# Patient Record
Sex: MALE | Race: WHITE | NOT HISPANIC OR LATINO | Employment: FULL TIME | URBAN - METROPOLITAN AREA
[De-identification: names, ages, dates, MRNs, and addresses within clinical notes are randomized per-mention and may not be internally consistent; named-entity substitution may affect disease eponyms.]

---

## 2017-02-14 ENCOUNTER — OFFICE VISIT (OUTPATIENT)
Dept: FAMILY MEDICINE | Facility: CLINIC | Age: 52
End: 2017-02-14
Payer: COMMERCIAL

## 2017-02-14 VITALS
TEMPERATURE: 97.9 F | SYSTOLIC BLOOD PRESSURE: 130 MMHG | BODY MASS INDEX: 28.64 KG/M2 | OXYGEN SATURATION: 98 % | HEIGHT: 68 IN | DIASTOLIC BLOOD PRESSURE: 64 MMHG | RESPIRATION RATE: 16 BRPM | HEART RATE: 90 BPM | WEIGHT: 189 LBS

## 2017-02-14 DIAGNOSIS — J02.9 ACUTE PHARYNGITIS, UNSPECIFIED ETIOLOGY: Primary | ICD-10-CM

## 2017-02-14 LAB
DEPRECATED S PYO AG THROAT QL EIA: NORMAL
MICRO REPORT STATUS: NORMAL
SPECIMEN SOURCE: NORMAL

## 2017-02-14 PROCEDURE — 99213 OFFICE O/P EST LOW 20 MIN: CPT | Performed by: PHYSICIAN ASSISTANT

## 2017-02-14 PROCEDURE — 87880 STREP A ASSAY W/OPTIC: CPT | Performed by: PHYSICIAN ASSISTANT

## 2017-02-14 PROCEDURE — 87081 CULTURE SCREEN ONLY: CPT | Performed by: PHYSICIAN ASSISTANT

## 2017-02-14 NOTE — MR AVS SNAPSHOT
"              After Visit Summary   2/14/2017    Castillo Allen    MRN: [id number]           Patient Information     Date Of Birth          1965        Visit Information        Provider Department      2/14/2017 8:10 AM Ingrid Nuñez PA-C [facility] [place]        Today's Diagnoses     Acute pharyngitis, unspecified etiology    -  1       Follow-ups after your visit        Who to contact     If you have questions or need follow up information about today's clinic visit or your schedule please contact [facility] directly at [phone].  Normal or non-critical lab and imaging results will be communicated to you by Wattvisionhart, letter or phone within 4 business days after the clinic has received the results. If you do not hear from us within 7 days, please contact the clinic through Acie[employer] The[employer] or phone. If you have a critical or abnormal lab result, we will notify you by phone as soon as possible.  Submit refill requests through Muchasa or call your pharmacy and they will forward the refill request to us. Please allow 3 business days for your refill to be completed.          Additional Information About Your Visit        MyChart Information     Muchasa gives you secure access to your electronic health record. If you see a primary care provider, you can also send messages to your care team and make appointments. If you have questions, please call your primary care clinic.  If you do not have a primary care provider, please call [phone] and they will assist you.        Care EveryWhere ID     This is your Care EveryWhere ID. This could be used by other organizations to access your [facility] medical records  [id number]        Your Vitals Were     Pulse Temperature Respirations Height Pulse Oximetry BMI (Body Mass Index)    90 97.9  F (36.6  C) (Tympanic) 16 5' 8\" (1.727 m) 98% 28.74 kg/m2       Blood Pressure from Last 3 Encounters:   02/14/17 130/64   12/06/16 138/72   10/25/16 " 136/88    Weight from Last 3 Encounters:   02/14/17 189 lb (85.7 kg)   12/06/16 187 lb 2 oz (84.9 kg)   10/25/16 183 lb 12.8 oz (83.4 kg)              We Performed the Following     Beta strep group A culture     Rapid strep screen        Primary Care Provider Office Phone # Fax #    Ingrid Sallie Nuñez PA-C [phone]       [facility] [address]  [facility] [address]        Thank you!     Thank you for choosing [facility]  for your care. Our goal is always to provide you with excellent care. Hearing back from our patients is one way we can continue to improve our services. Please take a few minutes to complete the written survey that you may receive in the mail after your visit with us. Thank you!             Your Updated Medication List - Protect others around you: Learn how to safely use, store and throw away your medicines at www.disposemymeds.org.          This list is accurate as of: 2/14/17  8:33 AM.  Always use your most recent med list.                   Brand Name Dispense Instructions for use    ALPRAZolam 0.25 MG tablet    XANAX    15 tablet    Take 1 tablet (0.25 mg) by mouth 3 times daily as needed for anxiety       IBUPROFEN PO          NO ACTIVE MEDICATIONS

## 2017-02-14 NOTE — NURSING NOTE
"Chief Complaint   Patient presents with     Throat Problem     x 3 days       Initial /64 (BP Location: Right arm, Patient Position: Chair, Cuff Size: Adult Large)  Pulse 90  Temp 97.9  F (36.6  C) (Tympanic)  Resp 16  Ht 5' 8\" (1.727 m)  Wt 189 lb (85.7 kg)  SpO2 98%  BMI 28.74 kg/m2 Estimated body mass index is 28.74 kg/(m^2) as calculated from the following:    Height as of this encounter: 5' 8\" (1.727 m).    Weight as of this encounter: 189 lb (85.7 kg).  Medication Reconciliation: complete     Patient would like to be notified at the following phone number for results from this visit   [phone] OK to leave message or Memo Briggs CMA (AAMA) 2/14/2017 8:17 AM      "

## 2017-02-14 NOTE — PROGRESS NOTES
SUBJECTIVE:                                                    Castillo Allen is a 51 year old male who presents to clinic today for the following health issues:      RESPIRATORY SYMPTOMS      Duration: x 3 days    Description  Sore Throat, Fatigue, Chills, Dry Cough, Bilateral Ear Pain--Right Ear Worse Than Left     Severity: moderate    Accompanying signs and symptoms: None    History (predisposing factors):  Recent Travel    Precipitating or alleviating factors: None    Therapies tried and outcome:  Tylenol       Patient is here today complaining of not feeling well for the last couple of days  Notes fatigue, sore throat that worsened last night but feels a bit better today  Bilateral ear pain, however right is worse than left  No fever, mild sinus drainage and cough  Admits to some chills  Taking Tylenol without relief  Recently back from [place]- vacation    Problem list and histories reviewed & adjusted, as indicated.  Additional history: as documented    Patient Active Problem List   Diagnosis     Allergic rhinitis     Hypertrophy of breast     Lumbago     Nonallopathic lesion of sacral region     Nonallopathic lesion of lower extremities     CARDIOVASCULAR SCREENING; LDL GOAL LESS THAN 160     Brachial neuritis or radiculitis     Past Surgical History   Procedure Laterality Date     Vasectomy         Social History   Substance Use Topics     Smoking status: Never Smoker     Smokeless tobacco: Never Used     Alcohol use 0.6 oz/week     1 Standard drinks or equivalent per week     Family History   Problem Relation Age of Onset     Cancer - colorectal Father       at about age 74--dx at around age 50     DIABETES Mother      DIABETES Maternal Grandmother      DIABETES Brother      44     Thyroid Disease Sister      Thyroid Disease Brother      Thyroid Disease Brother      CEREBROVASCULAR DISEASE Maternal Grandfather          Current Outpatient Prescriptions   Medication Sig Dispense Refill      "ALPRAZolam (XANAX) 0.25 MG tablet Take 1 tablet (0.25 mg) by mouth 3 times daily as needed for anxiety 15 tablet 0     IBUPROFEN PO        NO ACTIVE MEDICATIONS        Allergies   Allergen Reactions     Seasonal Allergies        ROS:  Constitutional, HEENT, cardiovascular, pulmonary, gi and gu systems are negative, except as otherwise noted.    OBJECTIVE:                                                    /64 (BP Location: Right arm, Patient Position: Chair, Cuff Size: Adult Large)  Pulse 90  Temp 97.9  F (36.6  C) (Tympanic)  Resp 16  Ht 5' 8\" (1.727 m)  Wt 189 lb (85.7 kg)  SpO2 98%  BMI 28.74 kg/m2  Body mass index is 28.74 kg/(m^2).  GENERAL: healthy, alert and no distress  EYES: Eyes grossly normal to inspection, PERRL and conjunctivae and sclerae normal  HENT: normal cephalic/atraumatic, ear canals and TM's normal, nose and mouth without ulcers or lesions, oropharynx clear, oral mucous membranes moist and tonsillar erythema  NECK: cervical adenopathy anterior bilateral, no asymmetry, masses, or scars and thyroid normal to palpation  RESP: lungs clear to auscultation - no rales, rhonchi or wheezes  CV: regular rate and rhythm, normal S1 S2, no S3 or S4, no murmur, click or rub, no peripheral edema and peripheral pulses strong  ABDOMEN: soft, nontender, no hepatosplenomegaly, no masses and bowel sounds normal  MS: no gross musculoskeletal defects noted, no edema    Diagnostic Test Results:  Results for orders placed or performed in visit on 02/14/17 (from the past 24 hour(s))   Rapid strep screen   Result Value Ref Range    Specimen Description Throat     Rapid Strep A Screen       NEGATIVE: No Group A streptococcal antigen detected by immunoassay, await   culture report.      Micro Report Status FINAL 02/14/2017         ASSESSMENT/PLAN:                                                            1. Acute pharyngitis, unspecified etiology  New problem, discussed with patient that illness is likely viral in " etiology. Recommend rest, fluids and over the counter medications.  Advised follow up if symptoms worsen or do not alleviate or improve.    - Rapid strep screen  - Beta strep group A culture    Risks, benefits and alternatives were discussed with patient. Agreeable to the plan of care.      Ingrid Nuñez PA-C  [facility]

## 2017-02-16 LAB
BACTERIA SPEC CULT: NORMAL
MICRO REPORT STATUS: NORMAL
SPECIMEN SOURCE: NORMAL

## 2017-09-13 ENCOUNTER — OFFICE VISIT (OUTPATIENT)
Dept: FAMILY MEDICINE | Facility: CLINIC | Age: 52
End: 2017-09-13
Payer: COMMERCIAL

## 2017-09-13 VITALS
HEART RATE: 78 BPM | TEMPERATURE: 97.5 F | WEIGHT: 185 LBS | OXYGEN SATURATION: 98 % | SYSTOLIC BLOOD PRESSURE: 134 MMHG | DIASTOLIC BLOOD PRESSURE: 80 MMHG | BODY MASS INDEX: 28.04 KG/M2 | HEIGHT: 68 IN

## 2017-09-13 DIAGNOSIS — Z00.00 ROUTINE GENERAL MEDICAL EXAMINATION AT A HEALTH CARE FACILITY: Primary | ICD-10-CM

## 2017-09-13 DIAGNOSIS — L72.3 SEBACEOUS CYST: ICD-10-CM

## 2017-09-13 DIAGNOSIS — N39.43 DRIBBLING URINE: ICD-10-CM

## 2017-09-13 DIAGNOSIS — Z23 NEED FOR PROPHYLACTIC VACCINATION AND INOCULATION AGAINST INFLUENZA: ICD-10-CM

## 2017-09-13 LAB
ALBUMIN SERPL-MCNC: 3.9 G/DL (ref 3.4–5)
ALP SERPL-CCNC: 84 U/L (ref 40–150)
ALT SERPL W P-5'-P-CCNC: 40 U/L (ref 0–70)
ANION GAP SERPL CALCULATED.3IONS-SCNC: 7 MMOL/L (ref 3–14)
AST SERPL W P-5'-P-CCNC: 24 U/L (ref 0–45)
BILIRUB SERPL-MCNC: 1.4 MG/DL (ref 0.2–1.3)
BUN SERPL-MCNC: 16 MG/DL (ref 7–30)
CALCIUM SERPL-MCNC: 8.8 MG/DL (ref 8.5–10.1)
CHLORIDE SERPL-SCNC: 102 MMOL/L (ref 94–109)
CHOLEST SERPL-MCNC: 171 MG/DL
CO2 SERPL-SCNC: 27 MMOL/L (ref 20–32)
CREAT SERPL-MCNC: 1.01 MG/DL (ref 0.66–1.25)
ERYTHROCYTE [DISTWIDTH] IN BLOOD BY AUTOMATED COUNT: 12 % (ref 10–15)
GFR SERPL CREATININE-BSD FRML MDRD: 77 ML/MIN/1.7M2
GLUCOSE SERPL-MCNC: 102 MG/DL (ref 70–99)
HCT VFR BLD AUTO: 43.3 % (ref 40–53)
HDLC SERPL-MCNC: 45 MG/DL
HGB BLD-MCNC: 15.6 G/DL (ref 13.3–17.7)
LDLC SERPL CALC-MCNC: 90 MG/DL
MCH RBC QN AUTO: 31 PG (ref 26.5–33)
MCHC RBC AUTO-ENTMCNC: 36 G/DL (ref 31.5–36.5)
MCV RBC AUTO: 86 FL (ref 78–100)
NONHDLC SERPL-MCNC: 126 MG/DL
PLATELET # BLD AUTO: 281 10E9/L (ref 150–450)
POTASSIUM SERPL-SCNC: 4.4 MMOL/L (ref 3.4–5.3)
PROT SERPL-MCNC: 6.9 G/DL (ref 6.8–8.8)
PSA SERPL-ACNC: 3.18 UG/L (ref 0–4)
RBC # BLD AUTO: 5.03 10E12/L (ref 4.4–5.9)
SODIUM SERPL-SCNC: 136 MMOL/L (ref 133–144)
TRIGL SERPL-MCNC: 181 MG/DL
TSH SERPL DL<=0.005 MIU/L-ACNC: 1.46 MU/L (ref 0.4–4)
WBC # BLD AUTO: 6.9 10E9/L (ref 4–11)

## 2017-09-13 PROCEDURE — G0103 PSA SCREENING: HCPCS | Performed by: PHYSICIAN ASSISTANT

## 2017-09-13 PROCEDURE — 84443 ASSAY THYROID STIM HORMONE: CPT | Performed by: PHYSICIAN ASSISTANT

## 2017-09-13 PROCEDURE — 90686 IIV4 VACC NO PRSV 0.5 ML IM: CPT | Performed by: PHYSICIAN ASSISTANT

## 2017-09-13 PROCEDURE — 80053 COMPREHEN METABOLIC PANEL: CPT | Performed by: PHYSICIAN ASSISTANT

## 2017-09-13 PROCEDURE — 80061 LIPID PANEL: CPT | Performed by: PHYSICIAN ASSISTANT

## 2017-09-13 PROCEDURE — 90471 IMMUNIZATION ADMIN: CPT | Performed by: PHYSICIAN ASSISTANT

## 2017-09-13 PROCEDURE — 99213 OFFICE O/P EST LOW 20 MIN: CPT | Mod: 25 | Performed by: PHYSICIAN ASSISTANT

## 2017-09-13 PROCEDURE — 85027 COMPLETE CBC AUTOMATED: CPT | Performed by: PHYSICIAN ASSISTANT

## 2017-09-13 PROCEDURE — 36415 COLL VENOUS BLD VENIPUNCTURE: CPT | Performed by: PHYSICIAN ASSISTANT

## 2017-09-13 PROCEDURE — 99396 PREV VISIT EST AGE 40-64: CPT | Mod: 25 | Performed by: PHYSICIAN ASSISTANT

## 2017-09-13 RX ORDER — ESCITALOPRAM OXALATE 20 MG/1
TABLET ORAL
COMMUNITY
Start: 2017-06-26 | End: 2017-10-12

## 2017-09-13 NOTE — PROGRESS NOTES
SUBJECTIVE:   CC: Castillo Allen is an 52 year old male who presents for preventative health visit.     Physical   Annual:     Getting at least 3 servings of Calcium per day::  Yes    Bi-annual eye exam::  Yes    Dental care twice a year::  Yes    Sleep apnea or symptoms of sleep apnea::  None    Diet::  Regular (no restrictions)    Frequency of exercise::  1 day/week    Duration of exercise::  Less than 15 minutes    Taking medications regularly::  Not Applicable    Additional concerns today::  YES (prostate concerns, lump/bump on head)      Prostate: over the last few years having trouble with slower urine, dribble urine          Today's PHQ-2 Score:   PHQ-2 ( 1999 Pfizer) 9/13/2017   Q1: Little interest or pleasure in doing things 0   Q2: Feeling down, depressed or hopeless 0   PHQ-2 Score 0   Q1: Little interest or pleasure in doing things Not at all   Q2: Feeling down, depressed or hopeless Not at all   PHQ-2 Score 0       Abuse: Current or Past(Physical, Sexual or Emotional)- No  Do you feel safe in your environment - Yes    Social History   Substance Use Topics     Smoking status: Never Smoker     Smokeless tobacco: Never Used     Alcohol use 0.6 oz/week     1 Standard drinks or equivalent per week     The patient does not drink >3 drinks per day nor >7 drinks per week.    Last PSA:   PSA   Date Value Ref Range Status   07/16/2015 1.96 0 - 4 ug/L Final       Reviewed orders with patient. Reviewed health maintenance and updated orders accordingly - Yes  Patient Active Problem List   Diagnosis     Allergic rhinitis     Hypertrophy of breast     Lumbago     Nonallopathic lesion of sacral region     Nonallopathic lesion of lower extremities     CARDIOVASCULAR SCREENING; LDL GOAL LESS THAN 160     Brachial neuritis or radiculitis     Past Surgical History:   Procedure Laterality Date     VASECTOMY         Social History   Substance Use Topics     Smoking status: Never Smoker     Smokeless tobacco: Never Used      "Alcohol use 0.6 oz/week     1 Standard drinks or equivalent per week     Family History   Problem Relation Age of Onset     Cancer - colorectal Father       at about age 74--dx at around age 50     DIABETES Mother      DIABETES Maternal Grandmother      DIABETES Brother      44     Thyroid Disease Sister      Thyroid Disease Brother      Thyroid Disease Brother      CEREBROVASCULAR DISEASE Maternal Grandfather          Current Outpatient Prescriptions   Medication Sig Dispense Refill     ALPRAZolam (XANAX) 0.25 MG tablet Take 1 tablet (0.25 mg) by mouth 3 times daily as needed for anxiety 15 tablet 0     IBUPROFEN PO        escitalopram (LEXAPRO) 20 MG tablet        Allergies   Allergen Reactions     Seasonal Allergies          Reviewed and updated as needed this visit by clinical staffTobacco  Allergies  Med Hx  Surg Hx  Fam Hx  Soc Hx        Reviewed and updated as needed this visit by Provider          ROS:  C: NEGATIVE for fever, chills, change in weight  I: patient is concerned about cysts on head, has had removed before, currently has two, ongoing for months, would like removed as they are bothersome  E: NEGATIVE for vision changes or irritation  ENT: NEGATIVE for ear, mouth and throat problems  R: NEGATIVE for significant cough or SOB  CV: NEGATIVE for chest pain, palpitations or peripheral edema  GI: NEGATIVE for nausea, abdominal pain, heartburn, or change in bowel habits   male: + dribbling urine and slow stream of urine, ongoing for years, wants to try medicine to help with flow, no painful, no hematuria, no erectile dysfunction.  M: NEGATIVE for significant arthralgias or myalgia  N: NEGATIVE for weakness, dizziness or paresthesias  P: notes mood is better, off Lexapro, has Xanax as needed.    OBJECTIVE:   /80 (BP Location: Right arm, Patient Position: Chair, Cuff Size: Adult Regular)  Pulse 78  Temp 97.5  F (36.4  C) (Tympanic)  Ht 5' 7.5\" (1.715 m)  Wt 185 lb (83.9 kg)  SpO2 " "98%  BMI 28.55 kg/m2    EXAM:  GENERAL: healthy, alert and no distress  EYES: Eyes grossly normal to inspection, PERRL and conjunctivae and sclerae normal  HENT: ear canals and TM's normal, nose and mouth without ulcers or lesions  NECK: no adenopathy, no asymmetry, masses, or scars and thyroid normal to palpation  RESP: lungs clear to auscultation - no rales, rhonchi or wheezes  CV: regular rate and rhythm, normal S1 S2, no S3 or S4, no murmur, click or rub, no peripheral edema and peripheral pulses strong  ABDOMEN: soft, nontender, no hepatosplenomegaly, no masses and bowel sounds normal  RECTAL: normal sphincter tone, no mass present, prostate smooth and symmetric  MS: no gross musculoskeletal defects noted, no edema  SKIN: + two small cysts on head  NEURO: Normal strength and tone, mentation intact and speech normal  PSYCH: mentation appears normal, affect normal/bright    ASSESSMENT/PLAN:   1. Routine general medical examination at a health care facility  - CBC with platelets  - Comprehensive metabolic panel  - TSH with free T4 reflex  - Lipid panel reflex to direct LDL    2. Dribbling urine  New problem, suspect BPH, will check PSA. Prostate exam normal.  Will trial Flomax if labs okay.  - Prostate spec antigen screen    3. Sebaceous cyst  New problem, referral placed to dermatology for removal.  - DERMATOLOGY REFERRAL    4. Need for prophylactic vaccination and inoculation against influenza  - FLU VAC, SPLIT VIRUS IM > 3 YO (QUADRIVALENT) [94030]  - Vaccine Administration, Initial [91123]    COUNSELING:   Reviewed preventive health counseling, as reflected in patient instructions       reports that he has never smoked. He has never used smokeless tobacco.      Estimated body mass index is 28.55 kg/(m^2) as calculated from the following:    Height as of this encounter: 5' 7.5\" (1.715 m).    Weight as of this encounter: 185 lb (83.9 kg).   Weight management plan: Discussed healthy diet and exercise guidelines " and patient will follow up in 12 months in clinic to re-evaluate.    Counseling Resources:  ATP IV Guidelines  Pooled Cohorts Equation Calculator  FRAX Risk Assessment  ICSI Preventive Guidelines  Dietary Guidelines for Americans, 2010  USDA's MyPlate  ASA Prophylaxis  Lung CA Screening    Ingrid Nuñez PA-C  [facility]  Answers for HPI/ROS submitted by the patient on 9/13/2017   PHQ-2 Score: 0    Injectable Influenza Immunization Documentation    1.  Are you sick today? (Fever of 100.5 or higher on the day of the clinic)   No    2.  Have you ever had Guillain-[place] Syndrome within 6 weeks of an influenza vaccionation?  No    3. Do you have a life-threatening allergy to eggs?  No    4. Do you have a life-threatening allergy to a component of the vaccine? May include antibiotics, gelatin or latex.  No     5. Have you ever had a reaction to a dose of flu vaccine that needed immediate medical attention?  No     Form completed by pt/.Mau HOOPER MA

## 2017-09-13 NOTE — MR AVS SNAPSHOT
After Visit Summary   9/13/2017    Castillo Allen    MRN: [id number]           Patient Information     Date Of Birth          1965        Visit Information        Provider Department      9/13/2017 7:50 AM Ingrid Nuñez PA-C [facility]        Today's Diagnoses     Routine general medical examination at a health care facility    -  1    Dribbling urine        Sebaceous cyst          Care Instructions      Preventive Health Recommendations  Male Ages 50 - 64    Yearly exam:             See your health care provider every year in order to  o   Review health changes.   o   Discuss preventive care.    o   Review your medicines if your doctor has prescribed any.     Have a cholesterol test every 5 years, or more frequently if you are at risk for high cholesterol/heart disease.     Have a diabetes test (fasting glucose) every three years. If you are at risk for diabetes, you should have this test more often.     Have a colonoscopy at age 50, or have a yearly FIT test (stool test). These exams will check for colon cancer.      Talk with your health care provider about whether or not a prostate cancer screening test (PSA) is right for you.    You should be tested each year for STDs (sexually transmitted diseases), if you re at risk.     Shots: Get a flu shot each year. Get a tetanus shot every 10 years.     Nutrition:    Eat at least 5 servings of fruits and vegetables daily.     Eat whole-grain bread, whole-wheat pasta and brown rice instead of white grains and rice.     Talk to your provider about Calcium and Vitamin D.     Lifestyle    Exercise for at least 150 minutes a week (30 minutes a day, 5 days a week). This will help you control your weight and prevent disease.     Limit alcohol to one drink per day.     No smoking.     Wear sunscreen to prevent skin cancer.     See your dentist every six months for an exam and cleaning.     See your eye doctor every 1 to 2  years.            Follow-ups after your visit        Additional Services     DERMATOLOGY REFERRAL       Your provider has referred you to: [facility]: [facility] [phone]   [email or web address]  [facility]: [facility] [place] [phone]   [email or web address]    Please be aware that coverage of these services is subject to the terms and limitations of your health insurance plan.  Call member services at your health plan with any benefit or coverage questions.      Please bring the following with you to your appointment:    (1) Any X-Rays, CTs or MRIs which have been performed.  Contact the facility where they were done to arrange for  prior to your scheduled appointment.    (2) List of current medications  (3) This referral request   (4) Any documents/labs given to you for this referral                  Who to contact     If you have questions or need follow up information about today's clinic visit or your schedule please contact [facility] directly at [phone].  Normal or non-critical lab and imaging results will be communicated to you by MyChart, letter or phone within 4 business days after the clinic has received the results. If you do not hear from us within 7 days, please contact the clinic through Flavourshart or phone. If you have a critical or abnormal lab result, we will notify you by phone as soon as possible.  Submit refill requests through Coveo or call your pharmacy and they will forward the refill request to us. Please allow 3 business days for your refill to be completed.          Additional Information About Your Visit        MyChart Information     Coveo gives you secure access to your electronic health record. If you see a primary care provider, you can also send messages to your care team and make appointments. If you have questions, please call your primary  "care clinic.  If you do not have a primary care provider, please call [phone] and they will assist you.        Care EveryWhere ID     This is your Care EveryWhere ID. This could be used by other organizations to access your [facility] medical records  [id number]        Your Vitals Were     Pulse Temperature Height Pulse Oximetry BMI (Body Mass Index)       78 97.5  F (36.4  C) (Tympanic) 5' 7.5\" (1.715 m) 98% 28.55 kg/m2        Blood Pressure from Last 3 Encounters:   09/13/17 134/80   02/14/17 130/64   12/06/16 138/72    Weight from Last 3 Encounters:   09/13/17 185 lb (83.9 kg)   02/14/17 189 lb (85.7 kg)   12/06/16 187 lb 2 oz (84.9 kg)              We Performed the Following     CBC with platelets     Comprehensive metabolic panel     DERMATOLOGY REFERRAL     Lipid panel reflex to direct LDL     Prostate spec antigen screen     TSH with free T4 reflex        Primary Care Provider Office Phone # Fax #    Ingrid Nuñez PA-C [phone] [address] [facility] [address]        Equal Access to Services     ANTONIO SOLIS AH: Hadii aad ku hadasho Soomaali, waaxda luqadaha, qaybta kaalmada adeegyada, waxay idiin hayaan adeeg khararobbie la'aan ah. So [facility] [phone].    ATENCIÓN: Si habla español, tiene a pulliam disposición servicios gratuitos de asistencia lingüística. Bibiame al [phone].    We comply with applicable federal civil rights laws and [place] laws. We do not discriminate on the basis of race, color, national origin, age, disability sex, sexual orientation or gender identity.            Thank you!     Thank you for choosing [facility]  for your care. Our goal is always to provide you with excellent care. Hearing back from our patients is one way we can continue to improve our services. Please take a few minutes to complete the written survey that you may receive in the mail after your visit with us. Thank you!             Your Updated Medication List - " Protect others around you: Learn how to safely use, store and throw away your medicines at www.disposemymeds.org.          This list is accurate as of: 9/13/17  8:08 AM.  Always use your most recent med list.                   Brand Name Dispense Instructions for use Diagnosis    ALPRAZolam 0.25 MG tablet    XANAX    15 tablet    Take 1 tablet (0.25 mg) by mouth 3 times daily as needed for anxiety    Adjustment disorder with mixed anxiety and depressed mood       escitalopram 20 MG tablet    LEXAPRO          IBUPROFEN PO

## 2017-09-13 NOTE — NURSING NOTE
"Chief Complaint   Patient presents with     Physical     fasting        Initial /80 (BP Location: Right arm, Patient Position: Chair, Cuff Size: Adult Regular)  Pulse 78  Temp 97.5  F (36.4  C) (Tympanic)  Ht 5' 7.5\" (1.715 m)  Wt 185 lb (83.9 kg)  SpO2 98%  BMI 28.55 kg/m2 Estimated body mass index is 28.55 kg/(m^2) as calculated from the following:    Height as of this encounter: 5' 7.5\" (1.715 m).    Weight as of this encounter: 185 lb (83.9 kg).  Medication Reconciliation: complete.Mau HOOPER MA      "

## 2017-10-02 ENCOUNTER — OFFICE VISIT (OUTPATIENT)
Dept: FAMILY MEDICINE | Facility: CLINIC | Age: 52
End: 2017-10-02
Payer: COMMERCIAL

## 2017-10-02 DIAGNOSIS — L72.11 PILAR CYST: Primary | ICD-10-CM

## 2017-10-02 PROCEDURE — 11422 EXC H-F-NK-SP B9+MARG 1.1-2: CPT | Performed by: FAMILY MEDICINE

## 2017-10-02 PROCEDURE — 11421 EXC H-F-NK-SP B9+MARG 0.6-1: CPT | Performed by: FAMILY MEDICINE

## 2017-10-02 NOTE — PROGRESS NOTES
[facility] - PRIMARY CARE SKIN    CC : cyst   SUBJECTIVE:                                                    Castillo Allen is a 52 year old male who presents to clinic today because of a cyst on the scalp. He has had previous excision of pilar cysts, about 10 previous cysts.    Enlarging : Yes.  Tenderness : Yes.  History of previous epidermoid cysts : Yes.    Personal history of skin cancer : NO.  Family history of skin cancer : NO.    Refer to electronic medical record (EMR) for past medical history and medications.    I:POSITIVE FOR cysts  ROS : 14 point review of systems was negative except the symptoms listed above in the HPI.    This document serves as a record of the services and decisions personally performed and made by Esperanza Monahan MD. It was created on her behalf by Brianna Monahan, a trained medical scribe.  The creation of this document is based on the scribe's personal observations and the provider's statements to the medical scribe.  Brianna Monahan, October 2, 2017 10:13 AM      OBJECTIVE:                                                    GENERAL: healthy, alert and no distress  SKIN: Sanchez Skin Type - II.  Scalp were examined. The dermatoscope was used to help evaluate pigmented lesions.  Skin Pertinent Findings:  Left posterior parietal scalp : 2 cm in size, subepidermal mass most consistent with pilar cyst.    Right parietal scalp : 1 cm in size, subepidermal mass most consistent with pilar cyst.    Diagnostic Test Results:  None.    MDM : . Discussion regarding treatment options for epidermoid cysts, including observation and excision. It was explained that the cyst can reoccur, especially if it has become inflamed or infected prior to removal. Discussed risks of the excision procedure, including infection and scarring.      ASSESSMENT:                                                      Encounter Diagnosis   Name Primary?     Pilar cyst Yes         PLAN:                                                     Patient Instructions   FUTURE APPOINTMENTS  Follow up in 7-10 day(s) for Suture Removal, with the nurse at any [facility]. If you go elsewhere, make sure to call ahead of time to get on their schedule.    SCALP POST-TREATMENT CARE INSTRUCTIONS  Do not go swimming, until the wound is healed.    However, showering is encouraged. The next time you shower, remove the dressing and clean the area with soap and water. Neither soap, water nor shampoo will hurt the surgical area. Dry the area well with a towel or hairdryer and then apply a small amount of Vaseline over the wound. Then, cover again with a new dressing.    Signs of Infection:  Infection can occur in any area where skin has been disrupted.  If you notice persistent redness, swelling, colored drainage, increasing pain, fever or other signs of infection, please call us at: [phone] and ask to have me or my colleague paged. We will call you back to discuss.        PROCEDURES:                                                    Name : Excision  Indication : Excision of irritated/enlarging pilar cyst.  Location(s) : Left posterior parietal scalp : 2 cm in size, subepidermal mass most consistent with pilar cyst.  Completed by : Esperanza Monahan MD  Photo Taken : no.  Anesthesia : Patient was anesthetized by infiltrating the area surrounding the lesion with 1% lidocaine.   epinephrine [id number]:417881 : Yes.  Buffered with bicarbonate : Yes.  Note : Discussed risks of the excision procedure, including pain, infection, scarring, hypopigmentation, hyperpigmentation and potential for recurrence or need for retreatment. Benefits of treatment and alternative treatments were also discussed.    During this procedure, the universal protocol was utilized. The patient's identity was confirmed by no less than two patient identifiers, correct procedure was verified, correct site was verified and marked as applicable and a final pause was  completed.    Sterile technique was used throughout the procedure. The skin was cleaned and prepped with surgical cleanser. Once adequate anesthesia was obtained, lesion excision was performed using a #15 blade. The cyst lining was identified, then dissected out with a Metzenbaum and a curved Geni. The lining was removed in total    Culture was not obtained.    Tissue samples submitted : NO.    Irrigated with sterile saline: No.    Direct pressure was applied for hemostasis. Edges were approximated with 3-0 Prolene interrupted simple stitch.    No bleeding was present upon the completion of the procedure. A dry sterile dressing was applied. Patient tolerated the procedure well and was discharged in satisfactory condition.  Total number of stitches in closure of epidermis : 3    Suture removal : 7-10 days.    Name : Excision  Indication : Excision of irritated/enlarging pilar cyst.  Location(s) : Right parietal scalp : 1 cm in size, subepidermal mass most consistent with pilar cyst.  Completed by : Esperanza Monahan MD  Photo Taken : no.  Anesthesia : Patient was anesthetized by infiltrating the area surrounding the lesion with 1% lidocaine.   epinephrine 1:[id number] : Yes.  Buffered with bicarbonate : Yes.  Note : Discussed risks of the excision procedure, including pain, infection, scarring, hypopigmentation, hyperpigmentation and potential for recurrence or need for retreatment. Benefits of treatment and alternative treatments were also discussed.    During this procedure, the universal protocol was utilized. The patient's identity was confirmed by no less than two patient identifiers, correct procedure was verified, correct site was verified and marked as applicable and a final pause was completed.    Sterile technique was used throughout the procedure. The skin was cleaned and prepped with surgical cleanser. Once adequate anesthesia was obtained, lesion excision was performed using a #15 blade. The cyst lining was  identified, then dissected out with a Metzenbaum and a curved Geni. The lining was removed in total    Culture was not obtained.    Tissue samples submitted : NO.    Irrigated with sterile saline: No.    Direct pressure was applied for hemostasis. Edges were approximated with 3-0 Prolene interrupted simple stitch.    No bleeding was present upon the completion of the procedure. A dry sterile dressing was applied. Patient tolerated the procedure well and was discharged in satisfactory condition.  Total number of stitches in closure of epidermis : 2    Suture removal : 7-10 days.      The information in this document, created by the medical scribe for me, accurately reflects the services I personally performed and the decisions made by me. I have reviewed and approved this document for accuracy prior to leaving the patient care area.  Esperanza Monahan MD October 2, 2017 10:13 AM  [facility] - PRIMARY CARE SKIN

## 2017-10-02 NOTE — PATIENT INSTRUCTIONS
FUTURE APPOINTMENTS  Follow up in 7-10 day(s) for Suture Removal, with the nurse at any [facility]. If you go elsewhere, make sure to call ahead of time to get on their schedule.    SCALP POST-TREATMENT CARE INSTRUCTIONS  Do not go swimming, until the wound is healed.    However, showering is encouraged. The next time you shower, remove the dressing and clean the area with soap and water. Neither soap, water nor shampoo will hurt the surgical area. Dry the area well with a towel or hairdryer and then apply a small amount of Vaseline over the wound. Then, cover again with a new dressing.    Signs of Infection:  Infection can occur in any area where skin has been disrupted.  If you notice persistent redness, swelling, colored drainage, increasing pain, fever or other signs of infection, please call us at: [phone] and ask to have me or my colleague paged. We will call you back to discuss.

## 2017-10-02 NOTE — MR AVS SNAPSHOT
After Visit Summary   10/2/2017    Castillo Allen    MRN: [id number]           Patient Information     Date Of Birth          1965        Visit Information        Provider Department      10/2/2017 10:20 AM Brianna Monahan MD [facility] Primary Care Skin        Today's Diagnoses     Pilar cyst    -  1      Care Instructions    FUTURE APPOINTMENTS  Follow up in 7-10 day(s) for Suture Removal, with the nurse at any [facility]. If you go elsewhere, make sure to call ahead of time to get on their schedule.    SCALP POST-TREATMENT CARE INSTRUCTIONS  Do not go swimming, until the wound is healed.    However, showering is encouraged. The next time you shower, remove the dressing and clean the area with soap and water. Neither soap, water nor shampoo will hurt the surgical area. Dry the area well with a towel or hairdryer and then apply a small amount of Vaseline over the wound. Then, cover again with a new dressing.    Signs of Infection:  Infection can occur in any area where skin has been disrupted.  If you notice persistent redness, swelling, colored drainage, increasing pain, fever or other signs of infection, please call us at: [phone] and ask to have me or my colleague paged. We will call you back to discuss.          Follow-ups after your visit        Who to contact     If you have questions or need follow up information about today's clinic visit or your schedule please contact [facility] PRIMARY CARE SKIN directly at [phone].  Normal or non-critical lab and imaging results will be communicated to you by MyChart, letter or phone within 4 business days after the clinic has received the results. If you do not hear from us within 7 days, please contact the clinic through [employer] or phone. If you have a critical or abnormal lab result, we will notify you by phone as soon as possible.  Submit refill requests through [employer] or call your pharmacy and they  will forward the refill request to us. Please allow 3 business days for your refill to be completed.          Additional Information About Your Visit        [employer] Information     [employer] gives you secure access to your electronic health record. If you see a primary care provider, you can also send messages to your care team and make appointments. If you have questions, please call your primary care clinic.  If you do not have a primary care provider, please call [phone] and they will assist you.        Care EveryWhere ID     This is your Care EveryWhere ID. This could be used by other organizations to access your [place] medical records  [id number]         Blood Pressure from Last 3 Encounters:   09/13/17 134/80   02/14/17 130/64   12/06/16 138/72    Weight from Last 3 Encounters:   09/13/17 185 lb (83.9 kg)   02/14/17 189 lb (85.7 kg)   12/06/16 187 lb 2 oz (84.9 kg)              Today, you had the following     No orders found for display       Primary Care Provider Office Phone # Fax #    Ingrid Nuñez PA-C [phone]       [address] [facility] [address]        Equal Access to Services     ANTONIO SOLIS : Hadii aad ku hadasho Soomaali, waaxda luqadaha, qaybta kaalmada adeegyada, irineo nguyen haytracyn prisca jansen labharti moore. So [facility] [phone].    ATENCIÓN: Si habla español, tiene a pulliam disposición servicios gratuitos de asistencia lingüística. Emilie al [phone].    We comply with applicable federal civil rights laws and [place] laws. We do not discriminate on the basis of race, color, national origin, age, disability, sex, sexual orientation, or gender identity.            Thank you!     Thank you for choosing [facility] - PRIMARY CARE [facility]  for your care. Our goal is always to provide you with excellent care. Hearing back from our patients is one way we can continue to improve our services. Please take a few minutes to complete the written survey that you may  receive in the mail after your visit with us. Thank you!             Your Updated Medication List - Protect others around you: Learn how to safely use, store and throw away your medicines at www.disposemymeds.org.          This list is accurate as of: 10/2/17 10:28 AM.  Always use your most recent med list.                   Brand Name Dispense Instructions for use Diagnosis    ALPRAZolam 0.25 MG tablet    XANAX    15 tablet    Take 1 tablet (0.25 mg) by mouth 3 times daily as needed for anxiety    Adjustment disorder with mixed anxiety and depressed mood       escitalopram 20 MG tablet    LEXAPRO          IBUPROFEN PO

## 2017-10-10 ENCOUNTER — ALLIED HEALTH/NURSE VISIT (OUTPATIENT)
Dept: NURSING | Facility: CLINIC | Age: 52
End: 2017-10-10
Payer: COMMERCIAL

## 2017-10-10 DIAGNOSIS — Z48.02 ENCOUNTER FOR REMOVAL OF SUTURES: Primary | ICD-10-CM

## 2017-10-10 PROCEDURE — 99207 ZZC NO CHARGE NURSE ONLY: CPT

## 2017-10-10 NOTE — MR AVS SNAPSHOT
After Visit Summary   10/10/2017    Castillo Allen    MRN: [id number]           Patient Information     Date Of Birth          1965        Visit Information        Provider Department      10/10/2017 9:30 AM  NURSE [facility]        Today's Diagnoses     Encounter for removal of sutures    -  1       Follow-ups after your visit        Your next 10 appointments already scheduled     Oct 10, 2017  9:30 AM CDT   Nurse Only with  NURSE   [facility]    [address] [facility] [address] [phone]              Who to contact     If you have questions or need follow up information about today's clinic visit or your schedule please contact [facility] directly at [phone].  Normal or non-critical lab and imaging results will be communicated to you by Sports[employer], letter or phone within 4 business days after the clinic has received the results. If you do not hear from us within 7 days, please contact the clinic through MyChart or phone. If you have a critical or abnormal lab result, we will notify you by phone as soon as possible.  Submit refill requests through [employer] or call your pharmacy and they will forward the refill request to us. Please allow 3 business days for your refill to be completed.          Additional Information About Your Visit        MyChart Information     [employer] gives you secure access to your electronic health record. If you see a primary care provider, you can also send messages to your care team and make appointments. If you have questions, please call your primary care clinic.  If you do not have a primary care provider, please call [phone] and they will assist you.        Care EveryWhere ID     This is your Care EveryWhere ID. This could be used by other organizations to access your [facility] medical records  [id number]         Blood Pressure from Last 3 Encounters:   09/13/17  134/80   02/14/17 130/64   12/06/16 138/72    Weight from Last 3 Encounters:   09/13/17 185 lb (83.9 kg)   02/14/17 189 lb (85.7 kg)   12/06/16 187 lb 2 oz (84.9 kg)              Today, you had the following     No orders found for display       Primary Care Provider Office Phone # Fax #    Ingrid Nuñez PA-C [phone]       [address] [address] [facility] [address]        Equal Access to Services     [facility]: Hadii aad ku hadasho Soomaali, waaxda luqadaha, qaybta kaalmada adeegyada, waxay reaganin haytracyn aderemberto givens . So [facility] [phone].    ATENCIÓN: Si habla español, tiene a pulliam disposición servicios gratuitos de asistencia lingüística. [facility] [phone].    We comply with applicable federal civil rights laws and [place] laws. We do not discriminate on the basis of race, color, national origin, age, disability, sex, sexual orientation, or gender identity.            Thank you!     Thank you for choosing [facility]  for your care. Our goal is always to provide you with excellent care. Hearing back from our patients is one way we can continue to improve our services. Please take a few minutes to complete the written survey that you may receive in the mail after your visit with us. Thank you!             Your Updated Medication List - Protect others around you: Learn how to safely use, store and throw away your medicines at www.disposemymeds.org.          This list is accurate as of: 10/10/17  8:43 AM.  Always use your most recent med list.                   Brand Name Dispense Instructions for use Diagnosis    ALPRAZolam 0.25 MG tablet    XANAX    15 tablet    Take 1 tablet (0.25 mg) by mouth 3 times daily as needed for anxiety    Adjustment disorder with mixed anxiety and depressed mood       escitalopram 20 MG tablet    LEXAPRO          IBUPROFEN PO

## 2017-10-10 NOTE — NURSING NOTE
Patient is here for suture removal after having 2 cysts removed from his scalp.  Right side scalp excision site has 1 continuous suture removed. Normal healing  Skin noted. No drainage. Second excision site left side back of head, has 3 sutures   Present, intact, no drainage, no redness. All sutures are removed. Patient tolerated  Well. Small area of oozing left site. Patient has been sent home. No further questions.  Chloe Seymour, CHAZ  Triage Nurse

## 2017-10-12 ENCOUNTER — OFFICE VISIT (OUTPATIENT)
Dept: FAMILY MEDICINE | Facility: CLINIC | Age: 52
End: 2017-10-12
Payer: COMMERCIAL

## 2017-10-12 VITALS
SYSTOLIC BLOOD PRESSURE: 134 MMHG | TEMPERATURE: 97.9 F | BODY MASS INDEX: 28.04 KG/M2 | DIASTOLIC BLOOD PRESSURE: 78 MMHG | HEART RATE: 88 BPM | WEIGHT: 185 LBS | OXYGEN SATURATION: 96 % | HEIGHT: 68 IN

## 2017-10-12 DIAGNOSIS — F43.23 ADJUSTMENT DISORDER WITH MIXED ANXIETY AND DEPRESSED MOOD: ICD-10-CM

## 2017-10-12 DIAGNOSIS — N40.1 BENIGN PROSTATIC HYPERPLASIA WITH LOWER URINARY TRACT SYMPTOMS, SYMPTOM DETAILS UNSPECIFIED: Primary | ICD-10-CM

## 2017-10-12 PROCEDURE — 99213 OFFICE O/P EST LOW 20 MIN: CPT | Performed by: PHYSICIAN ASSISTANT

## 2017-10-12 RX ORDER — ESCITALOPRAM OXALATE 20 MG/1
20 TABLET ORAL DAILY
Qty: 90 TABLET | Refills: 1 | Status: SHIPPED | OUTPATIENT
Start: 2017-10-12 | End: 2018-01-30

## 2017-10-12 RX ORDER — TAMSULOSIN HYDROCHLORIDE 0.4 MG/1
0.4 CAPSULE ORAL DAILY
Qty: 90 CAPSULE | Refills: 1 | Status: SHIPPED | OUTPATIENT
Start: 2017-10-12 | End: 2018-08-15

## 2017-10-12 RX ORDER — ALPRAZOLAM 0.25 MG
0.25 TABLET ORAL 3 TIMES DAILY PRN
Qty: 20 TABLET | Refills: 0 | Status: SHIPPED | OUTPATIENT
Start: 2017-10-12 | End: 2018-08-15

## 2017-10-12 NOTE — PROGRESS NOTES
SUBJECTIVE:   Castillo Allen is a 52 year old male who presents to clinic today for the following health issues:      Anxiety Follow-Up    Status since last visit: Worsened over last 1-2 weeks, Was not taking mediction     Other associated symptoms:None    Complicating factors:   Significant life event: Yes-  Children    Current substance abuse: None  Depression symptoms: No  BEVERLY-7 SCORE 4/6/2016 5/18/2016 9/23/2016   Total Score 6 1 13       GAD7      Amount of exercise or physical activity: 4-5 days/week for an average of 30-45 minutes    Problems taking medications regularly: No    Medication side effects: fatigue     Diet: regular (no restrictions)    -Patient presents today to discuss increased anxiety  -He notes a significant increaseonly in the past few weeks  -he was off of his medication over the summer and fall, feeling good  -but lately he has had increased symptoms secondary to problems at home   -his children are having difficulty with school and this is stressful   -differing opinions on how to handle these with his wife causing strain  -He is noting some loss of enjoyment of other activities because he is so stressed  -restarted 20mg lexapro around one week ago  -wondering what else he can do    -patient notes a lengthy hx of voiding concerns  -discussed with primary care provider starting flomax  -wondering if he can get this today    Problem list and histories reviewed & adjusted, as indicated.  Additional history: as documented    Patient Active Problem List   Diagnosis     Allergic rhinitis     Hypertrophy of breast     Lumbago     Nonallopathic lesion of sacral region     Nonallopathic lesion of lower extremities     CARDIOVASCULAR SCREENING; LDL GOAL LESS THAN 160     Brachial neuritis or radiculitis     Past Surgical History:   Procedure Laterality Date     VASECTOMY         Social History   Substance Use Topics     Smoking status: Never Smoker     Smokeless tobacco: Never Used     Alcohol  "use 0.6 oz/week     1 Standard drinks or equivalent per week     Family History   Problem Relation Age of Onset     Cancer - colorectal Father       at about age 74--dx at around age 50     DIABETES Mother      DIABETES Maternal Grandmother      DIABETES Brother      44     Thyroid Disease Sister      Thyroid Disease Brother      Thyroid Disease Brother      CEREBROVASCULAR DISEASE Maternal Grandfather              Reviewed and updated as needed this visit by clinical staff     Reviewed and updated as needed this visit by Provider         ROS:  Constitutional, HEENT, cardiovascular, pulmonary, gi and gu systems are negative, except as otherwise noted.      OBJECTIVE:   /78 (BP Location: Right arm, Cuff Size: Adult Large)  Pulse 88  Temp 97.9  F (36.6  C) (Oral)  Ht 5' 7.5\" (1.715 m)  Wt 185 lb (83.9 kg)  SpO2 96%  BMI 28.55 kg/m2  Body mass index is 28.55 kg/(m^2).  GENERAL: healthy, alert and no distress  PSYCH: mentation appears normal, affect normal/bright    Diagnostic Test Results:  Results for orders placed or performed in visit on 17   CBC with platelets   Result Value Ref Range    WBC 6.9 4.0 - 11.0 10e9/L    RBC Count 5.03 4.4 - 5.9 10e12/L    Hemoglobin 15.6 13.3 - 17.7 g/dL    Hematocrit 43.3 40.0 - 53.0 %    MCV 86 78 - 100 fl    MCH 31.0 26.5 - 33.0 pg    MCHC 36.0 31.5 - 36.5 g/dL    RDW 12.0 10.0 - 15.0 %    Platelet Count 281 150 - 450 10e9/L   Comprehensive metabolic panel   Result Value Ref Range    Sodium 136 133 - 144 mmol/L    Potassium 4.4 3.4 - 5.3 mmol/L    Chloride 102 94 - 109 mmol/L    Carbon Dioxide 27 20 - 32 mmol/L    Anion Gap 7 3 - 14 mmol/L    Glucose 102 (H) 70 - 99 mg/dL    Urea Nitrogen 16 7 - 30 mg/dL    Creatinine 1.01 0.66 - 1.25 mg/dL    GFR Estimate 77 >60 mL/min/1.7m2    GFR Estimate If Black >90 >60 mL/min/1.7m2    Calcium 8.8 8.5 - 10.1 mg/dL    Bilirubin Total 1.4 (H) 0.2 - 1.3 mg/dL    Albumin 3.9 3.4 - 5.0 g/dL    Protein Total 6.9 6.8 - 8.8 " g/dL    Alkaline Phosphatase 84 40 - 150 U/L    ALT 40 0 - 70 U/L    AST 24 0 - 45 U/L   TSH with free T4 reflex   Result Value Ref Range    TSH 1.46 0.40 - 4.00 mU/L   Lipid panel reflex to direct LDL   Result Value Ref Range    Cholesterol 171 <200 mg/dL    Triglycerides 181 (H) <150 mg/dL    HDL Cholesterol 45 >39 mg/dL    LDL Cholesterol Calculated 90 <100 mg/dL    Non HDL Cholesterol 126 <130 mg/dL   Prostate spec antigen screen   Result Value Ref Range    PSA 3.18 0 - 4 ug/L       ASSESSMENT/PLAN:   1. Adjustment disorder with mixed anxiety and depressed mood  Increased stress causing increased symptoms. Just restarted lexapro x 7 days. We reviewed possible options - certainly he'd do well with therapy he is just unsure of this at the moment. He has done well with SA benzo, and does not plan to use often, but we'll refill this today as a hopeful bridge while the lexapro takes further effect. It is not a planned routine medication and he is aware. He'll follow up with primary care provider as needed if not improving or worsening and if he's thinking about therapy referral.   - ALPRAZolam (XANAX) 0.25 MG tablet; Take 1 tablet (0.25 mg) by mouth 3 times daily as needed for anxiety  Dispense: 20 tablet; Refill: 0  - escitalopram (LEXAPRO) 20 MG tablet; Take 1 tablet (20 mg) by mouth daily  Dispense: 90 tablet; Refill: 1    2. Benign prostatic hyperplasia with lower urinary tract symptoms, symptom details unspecified  Reviewed labs with patient in addition to side effects and benefits to medication. Filling today, ok to consider increased dosing if less effective.   - tamsulosin (FLOMAX) 0.4 MG capsule; Take 1 capsule (0.4 mg) by mouth daily  Dispense: 90 capsule; Refill: 1    Nicolas Escobedo PA-C  [facility]

## 2017-10-12 NOTE — NURSING NOTE
"Chief Complaint   Patient presents with     Anxiety       Initial /78 (BP Location: Right arm, Cuff Size: Adult Large)  Pulse 88  Temp 97.9  F (36.6  C) (Oral)  Ht 5' 7.5\" (1.715 m)  Wt 185 lb (83.9 kg)  SpO2 96%  BMI 28.55 kg/m2 Estimated body mass index is 28.55 kg/(m^2) as calculated from the following:    Height as of this encounter: 5' 7.5\" (1.715 m).    Weight as of this encounter: 185 lb (83.9 kg).  Medication Reconciliation: complete   Augustin Rankin CMA      "

## 2017-10-12 NOTE — MR AVS SNAPSHOT
"              After Visit Summary   10/12/2017    Castillo Allen    MRN: [id number]           Patient Information     Date Of Birth          1965        Visit Information        Provider Department      10/12/2017 1:20 PM Nicolas Escobedo PA-C [facility]        Today's Diagnoses     Benign prostatic hyperplasia with lower urinary tract symptoms, symptom details unspecified    -  1    Adjustment disorder with mixed anxiety and depressed mood           Follow-ups after your visit        Who to contact     If you have questions or need follow up information about today's clinic visit or your schedule please contact [facility] directly at [phone].  Normal or non-critical lab and imaging results will be communicated to you by Max-Wellnesshart, letter or phone within 4 business days after the clinic has received the results. If you do not hear from us within 7 days, please contact the clinic through Max-Wellnesshart or phone. If you have a critical or abnormal lab result, we will notify you by phone as soon as possible.  Submit refill requests through Zerve or call your pharmacy and they will forward the refill request to us. Please allow 3 business days for your refill to be completed.          Additional Information About Your Visit        MyChart Information     Zerve gives you secure access to your electronic health record. If you see a primary care provider, you can also send messages to your care team and make appointments. If you have questions, please call your primary care clinic.  If you do not have a primary care provider, please call [phone] and they will assist you.        Care EveryWhere ID     This is your Care EveryWhere ID. This could be used by other organizations to access your [facility] medical records  [id number]        Your Vitals Were     Pulse Temperature Height Pulse Oximetry BMI (Body Mass Index)       88 97.9  F (36.6  C) (Oral) 5' 7.5\" (1.715 m) 96% 28.55 " kg/m2        Blood Pressure from Last 3 Encounters:   10/12/17 134/78   09/13/17 134/80   02/14/17 130/64    Weight from Last 3 Encounters:   10/12/17 185 lb (83.9 kg)   09/13/17 185 lb (83.9 kg)   02/14/17 189 lb (85.7 kg)              Today, you had the following     No orders found for display         Today's Medication Changes          These changes are accurate as of: 10/12/17  2:06 PM.  If you have any questions, ask your nurse or doctor.               Start taking these medicines.        Dose/Directions    tamsulosin 0.4 MG capsule   Commonly known as:  FLOMAX   Used for:  Adjustment disorder with mixed anxiety and depressed mood   Started by:  Nicolas Escobedo PA-C        Dose:  0.4 mg   Take 1 capsule (0.4 mg) by mouth daily   Quantity:  90 capsule   Refills:  1         These medicines have changed or have updated prescriptions.        Dose/Directions    escitalopram 20 MG tablet   Commonly known as:  LEXAPRO   This may have changed:    - how much to take  - how to take this  - when to take this   Used for:  Adjustment disorder with mixed anxiety and depressed mood   Changed by:  Nicolas Escobedo PA-C        Dose:  20 mg   Take 1 tablet (20 mg) by mouth daily   Quantity:  90 tablet   Refills:  1            Where to get your medicines      These medications were sent to [facility] [place]  [place] - [address]  [address] [place] [address]     Phone:  [phone]     escitalopram 20 MG tablet    tamsulosin 0.4 MG capsule         Some of these will need a paper prescription and others can be bought over the counter.  Ask your nurse if you have questions.     Bring a paper prescription for each of these medications     ALPRAZolam 0.25 MG tablet                Primary Care Provider Office Phone # Fax #    Ingrid Nuñez PA-C [phone] [address]  [facility] [address]        Equal Access to Services     NELSON SOLIS AH: Madeline hilliard  ronald Hagan, waharrisonda lukellyadaha, qaybta katracy stapleton, irineo reaganin hayaascarlett hernandezremberto shravanseema latoriscarlett teresa. So [facility] [phone].    ATENCIÓN: Si habla español, tiene a pulliam disposición servicios gratuitos de asistencia lingüística. Emilie al [phone].    We comply with applicable federal civil rights laws and [place] laws. We do not discriminate on the basis of race, color, national origin, age, disability, sex, sexual orientation, or gender identity.            Thank you!     [facility] you [facility]  [facility] your [facility]. Our goal is always to provide you with excellent care. Hearing back from our patients is one way we can continue to improve our services. Please take a few minutes to complete the written survey that you may receive in the mail after your visit with us. Thank you!             Your Updated Medication List - Protect others around you: Learn how to safely use, store and throw away your medicines at www.disposemymeds.org.          This list is accurate as of: 10/12/17  2:06 PM.  Always use your most recent med list.                   Brand Name Dispense Instructions for use Diagnosis    ALPRAZolam 0.25 MG tablet    XANAX    20 tablet    Take 1 tablet (0.25 mg) by mouth 3 times daily as needed for anxiety    Adjustment disorder with mixed anxiety and depressed mood       escitalopram 20 MG tablet    LEXAPRO    90 tablet    Take 1 tablet (20 mg) by mouth daily    Adjustment disorder with mixed anxiety and depressed mood       IBUPROFEN PO           tamsulosin 0.4 MG capsule    FLOMAX    90 capsule    Take 1 capsule (0.4 mg) by mouth daily    Adjustment disorder with mixed anxiety and depressed mood

## 2017-10-13 ASSESSMENT — ANXIETY QUESTIONNAIRES
3. WORRYING TOO MUCH ABOUT DIFFERENT THINGS: MORE THAN HALF THE DAYS
5. BEING SO RESTLESS THAT IT IS HARD TO SIT STILL: SEVERAL DAYS
7. FEELING AFRAID AS IF SOMETHING AWFUL MIGHT HAPPEN: MORE THAN HALF THE DAYS
2. NOT BEING ABLE TO STOP OR CONTROL WORRYING: MORE THAN HALF THE DAYS
1. FEELING NERVOUS, ANXIOUS, OR ON EDGE: MORE THAN HALF THE DAYS
IF YOU CHECKED OFF ANY PROBLEMS ON THIS QUESTIONNAIRE, HOW DIFFICULT HAVE THESE PROBLEMS MADE IT FOR YOU TO DO YOUR WORK, TAKE CARE OF THINGS AT HOME, OR GET ALONG WITH OTHER PEOPLE: SOMEWHAT DIFFICULT
6. BECOMING EASILY ANNOYED OR IRRITABLE: SEVERAL DAYS
GAD7 TOTAL SCORE: 12

## 2017-10-13 ASSESSMENT — PATIENT HEALTH QUESTIONNAIRE - PHQ9
5. POOR APPETITE OR OVEREATING: MORE THAN HALF THE DAYS
SUM OF ALL RESPONSES TO PHQ QUESTIONS 1-9: 12

## 2017-10-14 ASSESSMENT — ANXIETY QUESTIONNAIRES: GAD7 TOTAL SCORE: 12

## 2017-12-13 ENCOUNTER — ALLIED HEALTH/NURSE VISIT (OUTPATIENT)
Dept: NURSING | Facility: CLINIC | Age: 52
End: 2017-12-13
Payer: COMMERCIAL

## 2017-12-13 DIAGNOSIS — Z11.1 SCREENING EXAMINATION FOR PULMONARY TUBERCULOSIS: Primary | ICD-10-CM

## 2017-12-13 PROCEDURE — 99207 ZZC NO CHARGE NURSE ONLY: CPT

## 2017-12-13 NOTE — MR AVS SNAPSHOT
After Visit Summary   12/13/2017    Castillo Allen    MRN: [id number]           Patient Information     Date Of Birth          1965        Visit Information        Provider Department      12/13/2017 3:30 PM  NURSE [facility] S[facility]        Today's Diagnoses     Screening examination for pulmonary tuberculosis    -  1       Follow-ups after your visit        Who to contact     If you have questions or need follow up information about today's clinic visit or your schedule please contact [facility] directly at [phone].  Normal or non-critical lab and imaging results will be communicated to you by Genii Technologieshart, letter or phone within 4 business days after the clinic has received the results. If you do not hear from us within 7 days, please contact the clinic through Kireego Solutionst or phone. If you have a critical or abnormal lab result, we will notify you by phone as soon as possible.  Submit refill requests through [email or web address] or call your pharmacy and they will forward the refill request to us. Please allow 3 business days for your refill to be completed.          Additional Information About Your Visit        MyChart Information     [email or web address] gives you secure access to your electronic health record. If you see a primary care provider, you can also send messages to your care team and make appointments. If you have questions, please call your primary care clinic.  If you do not have a primary care provider, please call [phone] and they will assist you.        Care EveryWhere ID     This is your Care EveryWhere ID. This could be used by other organizations to access your [facility] medical records  [id number]         Blood Pressure from Last 3 Encounters:   10/12/17 134/78   09/13/17 134/80   02/14/17 130/64    Weight from Last 3 Encounters:   10/12/17 185 lb (83.9 kg)   09/13/17 185 lb (83.9 kg)   02/14/17 189 lb (85.7 kg)              Today, you had the following     No orders  found for display       Primary Care Provider Office Phone # Fax #    Ingrid Sallie Nuñez PA-C [phone]       [address] [facility] [address]        Equal Access to Services     NELSON SOLIS : Hadii arminda ku hadpaolao Sobreannaali, waaxda luqadaha, qaybta kaalmada adeegyada, waxotf idiin radhan aderemberto jansen laKielmatt moore. So [facility] [phone].    ATENCIÓN: Si habla español, tiene a pulliam disposición servicios gratuitos de asistencia lingüística. Llame al [phone].    We comply with applicable federal civil rights laws and [place] laws. We do not discriminate on the basis of race, color, national origin, age, disability, sex, sexual orientation, or gender identity.            Thank you!     Thank you for choosing [facility]  for your care. Our goal is always to provide you with excellent care. Hearing back from our patients is one way we can continue to improve our services. Please take a few minutes to complete the written survey that you may receive in the mail after your visit with us. Thank you!             Your Updated Medication List - Protect others around you: Learn how to safely use, store and throw away your medicines at www.disposemymeds.org.          This list is accurate as of: 12/13/17  3:41 PM.  Always use your most recent med list.                   Brand Name Dispense Instructions for use Diagnosis    ALPRAZolam 0.25 MG tablet    XANAX    20 tablet    Take 1 tablet (0.25 mg) by mouth 3 times daily as needed for anxiety    Adjustment disorder with mixed anxiety and depressed mood       escitalopram 20 MG tablet    LEXAPRO    90 tablet    Take 1 tablet (20 mg) by mouth daily    Adjustment disorder with mixed anxiety and depressed mood       IBUPROFEN PO           tamsulosin 0.4 MG capsule    FLOMAX    90 capsule    Take 1 capsule (0.4 mg) by mouth daily    Benign prostatic hyperplasia with lower urinary tract symptoms, symptom details unspecified

## 2017-12-13 NOTE — NURSING NOTE
Pt comes in for a mantoux read with paperwork.  Mantoux given at [facility] on 12/11/17 at 12:15.  No induration.  No swelling.  No reddness.  Negative mantoux.

## 2018-01-30 DIAGNOSIS — F43.23 ADJUSTMENT DISORDER WITH MIXED ANXIETY AND DEPRESSED MOOD: ICD-10-CM

## 2018-01-30 NOTE — TELEPHONE ENCOUNTER
"Requested Prescriptions   Pending Prescriptions Disp Refills     escitalopram (LEXAPRO) 20 MG tablet [Pharmacy Med Name: ESCITALOPRAM OXALATE 20MG TABS]  Last Written Prescription Date:  10/12/17  Last Fill Quantity: 90,  # refills: 1   Last Office Visit with [facility] provider:  10/12/2017     Future Office Visit:      90 tablet 1     Sig: TAKE 1 TABLET BY MOUTH ONCE DAILY    SSRIs Protocol Passed    1/30/2018  9:34 AM  PHQ-9 SCORE 5/18/2016 9/23/2016 10/13/2017   Total Score 3 10 12     BEVERLY-7 SCORE 5/18/2016 9/23/2016 10/13/2017   Total Score 1 13 12              Passed - Recent or future visit with authorizing provider    Patient had office visit in the last year or has a visit in the next 30 days with authorizing provider.  See \"Patient Info\" tab in inbasket, or \"Choose Columns\" in Meds & Orders section of the refill encounter.            Passed - Patient is age 18 or older          "

## 2018-02-01 NOTE — TELEPHONE ENCOUNTER
PHQ-9 score:    PHQ-9 SCORE 10/13/2017   Total Score 12       Routing refill request to provider for review/approval because:  PHQ-9>5    Cyndi VO RN, BSN, PHN  Don Mayer RN

## 2018-02-02 RX ORDER — ESCITALOPRAM OXALATE 20 MG/1
TABLET ORAL
Qty: 90 TABLET | Refills: 1 | Status: SHIPPED | OUTPATIENT
Start: 2018-02-02 | End: 2019-08-14

## 2018-03-03 ENCOUNTER — OFFICE VISIT (OUTPATIENT)
Dept: URGENT CARE | Facility: URGENT CARE | Age: 53
End: 2018-03-03
Payer: COMMERCIAL

## 2018-03-03 VITALS
HEART RATE: 99 BPM | SYSTOLIC BLOOD PRESSURE: 122 MMHG | DIASTOLIC BLOOD PRESSURE: 74 MMHG | TEMPERATURE: 98.1 F | OXYGEN SATURATION: 95 %

## 2018-03-03 DIAGNOSIS — R22.1 SWOLLEN UVULA: Primary | ICD-10-CM

## 2018-03-03 DIAGNOSIS — R07.0 THROAT PAIN: ICD-10-CM

## 2018-03-03 LAB
BASOPHILS # BLD AUTO: 0 10E9/L (ref 0–0.2)
BASOPHILS NFR BLD AUTO: 0.4 %
DEPRECATED S PYO AG THROAT QL EIA: NORMAL
DIFFERENTIAL METHOD BLD: NORMAL
EOSINOPHIL # BLD AUTO: 0.3 10E9/L (ref 0–0.7)
EOSINOPHIL NFR BLD AUTO: 4.8 %
ERYTHROCYTE [DISTWIDTH] IN BLOOD BY AUTOMATED COUNT: 12.6 % (ref 10–15)
HCT VFR BLD AUTO: 43.3 % (ref 40–53)
HGB BLD-MCNC: 15.6 G/DL (ref 13.3–17.7)
LYMPHOCYTES # BLD AUTO: 2 10E9/L (ref 0.8–5.3)
LYMPHOCYTES NFR BLD AUTO: 27.8 %
MCH RBC QN AUTO: 31.1 PG (ref 26.5–33)
MCHC RBC AUTO-ENTMCNC: 36 G/DL (ref 31.5–36.5)
MCV RBC AUTO: 86 FL (ref 78–100)
MONOCYTES # BLD AUTO: 0.6 10E9/L (ref 0–1.3)
MONOCYTES NFR BLD AUTO: 8.2 %
NEUTROPHILS # BLD AUTO: 4.1 10E9/L (ref 1.6–8.3)
NEUTROPHILS NFR BLD AUTO: 58.8 %
PLATELET # BLD AUTO: 279 10E9/L (ref 150–450)
RBC # BLD AUTO: 5.01 10E12/L (ref 4.4–5.9)
SPECIMEN SOURCE: NORMAL
WBC # BLD AUTO: 7 10E9/L (ref 4–11)

## 2018-03-03 PROCEDURE — 85025 COMPLETE CBC W/AUTO DIFF WBC: CPT | Performed by: FAMILY MEDICINE

## 2018-03-03 PROCEDURE — 99213 OFFICE O/P EST LOW 20 MIN: CPT | Performed by: FAMILY MEDICINE

## 2018-03-03 PROCEDURE — 87880 STREP A ASSAY W/OPTIC: CPT | Performed by: FAMILY MEDICINE

## 2018-03-03 PROCEDURE — 87081 CULTURE SCREEN ONLY: CPT | Performed by: FAMILY MEDICINE

## 2018-03-03 PROCEDURE — 36415 COLL VENOUS BLD VENIPUNCTURE: CPT | Performed by: FAMILY MEDICINE

## 2018-03-03 NOTE — PATIENT INSTRUCTIONS
If you find yourself having difficulty opening your mouth or excessively drooling return for evaluation immediately    Try an antihistamine of your choice for the next couple of days: such as benadryl, cetirizine, or loratadine

## 2018-03-03 NOTE — MR AVS SNAPSHOT
After Visit Summary   3/3/2018    Castillo Allen    MRN: [id number]           Patient Information     Date Of Birth          1965        Visit Information        Provider Department      3/3/2018 11:05 AM Roger Newman MD [facility]        Today's Diagnoses     Swollen uvula    -  1    Throat pain          Care Instructions    If you find yourself having difficulty opening your mouth or excessively drooling return for evaluation immediately    Try an antihistamine of your choice for the next couple of days: such as benadryl, cetirizine, or loratadine          Follow-ups after your visit        Who to contact     If you have questions or need follow up information about today's clinic visit or your schedule please contact [facility] directly at [phone].  Normal or non-critical lab and imaging results will be communicated to you by MyChart, letter or phone within 4 business days after the clinic has received the results. If you do not hear from us within 7 days, please contact the clinic through MyChart or phone. If you have a critical or abnormal lab result, we will notify you by phone as soon as possible.  Submit refill requests through Where Was it Filmed or call your pharmacy and they will forward the refill request to us. Please allow 3 business days for your refill to be completed.          Additional Information About Your Visit        MyChart Information     Where Was it Filmed gives you secure access to your electronic health record. If you see a primary care provider, you can also send messages to your care team and make appointments. If you have questions, please call your primary care clinic.  If you do not have a primary care provider, please call [phone] and they will assist you.        Care EveryWhere ID     This is your Care EveryWhere ID. This could be used by other organizations to access your [facility] medical records  [id number]        Your  Vitals Were     Pulse Temperature Pulse Oximetry             99 98.1  F (36.7  C) (Oral) 95%          Blood Pressure from Last 3 Encounters:   03/03/18 122/74   10/12/17 134/78   09/13/17 134/80    Weight from Last 3 Encounters:   10/12/17 185 lb (83.9 kg)   09/13/17 185 lb (83.9 kg)   02/14/17 189 lb (85.7 kg)              We Performed the Following     Beta strep group A culture     CBC with platelets and differential     Rapid strep screen        Primary Care Provider Office Phone # Fax #    Ingrid Nuñez PA-C [phone]       [address] [address] [facility] [address]        Equal Access to Services     NELSON SOLIS : Hadii arminda cardenas hadasho Sobreannaali, waaxda luqadaha, qaybta kaalmada adeegyada, irineo givens . So [facility] [phone].    ATENCIÓN: Si habla español, tiene a pulliam disposición servicios gratuitos de asistencia lingüística. Llame al [phone].    We comply with applicable federal civil rights laws and [place] laws. We do not discriminate on the basis of race, color, national origin, age, disability, sex, sexual orientation, or gender identity.            Thank you!     Thank you for choosing [facility]  for your care. Our goal is always to provide you with excellent care. Hearing back from our patients is one way we can continue to improve our services. Please take a few minutes to complete the written survey that you may receive in the mail after your visit with us. Thank you!             Your Updated Medication List - Protect others around you: Learn how to safely use, store and throw away your medicines at www.disposemymeds.org.          This list is accurate as of 3/3/18 11:38 AM.  Always use your most recent med list.                   Brand Name Dispense Instructions for use Diagnosis    ALPRAZolam 0.25 MG tablet    XANAX    20 tablet    Take 1 tablet (0.25 mg) by mouth 3 times daily as needed for anxiety    Adjustment disorder  with mixed anxiety and depressed mood       escitalopram 20 MG tablet    LEXAPRO    90 tablet    TAKE 1 TABLET BY MOUTH ONCE DAILY    Adjustment disorder with mixed anxiety and depressed mood       IBUPROFEN PO           tamsulosin 0.4 MG capsule    FLOMAX    90 capsule    Take 1 capsule (0.4 mg) by mouth daily    Benign prostatic hyperplasia with lower urinary tract symptoms, symptom details unspecified

## 2018-03-04 LAB
BACTERIA SPEC CULT: NORMAL
SPECIMEN SOURCE: NORMAL

## 2018-05-07 ENCOUNTER — TELEPHONE (OUTPATIENT)
Dept: NURSING | Facility: CLINIC | Age: 53
End: 2018-05-07

## 2018-05-07 NOTE — TELEPHONE ENCOUNTER
Patient calling with reports of bright red blood swirled with stool in toilet water. Patient not straining. Patient has been constipated last couple of days but not today. No h/o hemorrhoids. Not on blood thinners. No blood noticed in underwear. No pain, no itching. No dizziness, no N/V. No black stools noted.    No blood thinners. Not using Flomax any more. Tried it but nothing changed. No concerns, doesn't want to follow-up at this time. Only actively taking Lexapro at this time. No herbals or OTC meds in use.     Advised patient to include fiber and stay well hydrated to avoid constipation. Advised to monitor 24-48 hours and if bleeding does not cease, if he notices it in underwear (outside of having a BM) or if it increases to call for appointment. Patient in agreement with plan.    Tequila ROSA Triage RN

## 2018-08-15 ENCOUNTER — OFFICE VISIT (OUTPATIENT)
Dept: FAMILY MEDICINE | Facility: CLINIC | Age: 53
End: 2018-08-15
Payer: COMMERCIAL

## 2018-08-15 VITALS — HEART RATE: 94 BPM | SYSTOLIC BLOOD PRESSURE: 121 MMHG | DIASTOLIC BLOOD PRESSURE: 80 MMHG | OXYGEN SATURATION: 98 %

## 2018-08-15 VITALS
HEIGHT: 68 IN | HEART RATE: 89 BPM | OXYGEN SATURATION: 95 % | RESPIRATION RATE: 16 BRPM | SYSTOLIC BLOOD PRESSURE: 134 MMHG | WEIGHT: 201 LBS | BODY MASS INDEX: 30.46 KG/M2 | TEMPERATURE: 98 F | DIASTOLIC BLOOD PRESSURE: 84 MMHG

## 2018-08-15 DIAGNOSIS — Z11.59 NEED FOR HEPATITIS C SCREENING TEST: ICD-10-CM

## 2018-08-15 DIAGNOSIS — L98.9 SKIN LESION: ICD-10-CM

## 2018-08-15 DIAGNOSIS — R73.09 ELEVATED GLUCOSE: ICD-10-CM

## 2018-08-15 DIAGNOSIS — Z83.49 FAMILY HISTORY OF THYROID DISORDER: ICD-10-CM

## 2018-08-15 DIAGNOSIS — R53.83 OTHER FATIGUE: Primary | ICD-10-CM

## 2018-08-15 DIAGNOSIS — D48.5 NEOPLASM OF UNCERTAIN BEHAVIOR OF SKIN: Primary | ICD-10-CM

## 2018-08-15 LAB
ERYTHROCYTE [DISTWIDTH] IN BLOOD BY AUTOMATED COUNT: 12.6 % (ref 10–15)
HCT VFR BLD AUTO: 42.4 % (ref 40–53)
HGB BLD-MCNC: 15.1 G/DL (ref 13.3–17.7)
MCH RBC QN AUTO: 30.7 PG (ref 26.5–33)
MCHC RBC AUTO-ENTMCNC: 35.6 G/DL (ref 31.5–36.5)
MCV RBC AUTO: 86 FL (ref 78–100)
PLATELET # BLD AUTO: 275 10E9/L (ref 150–450)
RBC # BLD AUTO: 4.92 10E12/L (ref 4.4–5.9)
WBC # BLD AUTO: 6.6 10E9/L (ref 4–11)

## 2018-08-15 PROCEDURE — 88305 TISSUE EXAM BY PATHOLOGIST: CPT | Mod: TC | Performed by: FAMILY MEDICINE

## 2018-08-15 PROCEDURE — 84443 ASSAY THYROID STIM HORMONE: CPT | Performed by: PHYSICIAN ASSISTANT

## 2018-08-15 PROCEDURE — 11300 SHAVE SKIN LESION 0.5 CM/<: CPT | Performed by: FAMILY MEDICINE

## 2018-08-15 PROCEDURE — 99213 OFFICE O/P EST LOW 20 MIN: CPT | Performed by: PHYSICIAN ASSISTANT

## 2018-08-15 PROCEDURE — 80053 COMPREHEN METABOLIC PANEL: CPT | Performed by: PHYSICIAN ASSISTANT

## 2018-08-15 PROCEDURE — 83036 HEMOGLOBIN GLYCOSYLATED A1C: CPT | Performed by: PHYSICIAN ASSISTANT

## 2018-08-15 PROCEDURE — 85027 COMPLETE CBC AUTOMATED: CPT | Performed by: PHYSICIAN ASSISTANT

## 2018-08-15 PROCEDURE — 86803 HEPATITIS C AB TEST: CPT | Performed by: PHYSICIAN ASSISTANT

## 2018-08-15 PROCEDURE — 36415 COLL VENOUS BLD VENIPUNCTURE: CPT | Performed by: PHYSICIAN ASSISTANT

## 2018-08-15 NOTE — PROGRESS NOTES
SUBJECTIVE:   Castillo Allen is a 53 year old male who presents to clinic today for the following health issues:      Fatigue      Duration: couple of months    Description (location/character/radiation): feeling tired more than usual in the morning; says it isn't a motivation problem but just feeling tired    Intensity:  Mild-mod    Accompanying signs and symptoms: things have been more chaotic at home per patient (got a dog and summer is filled with busy schedules)    History (similar episodes/previous evaluation): has family hx of thyroid disorders (2 brothers and 1 sister and mom); last TSH tested 9/13/17 was normal    Precipitating or alleviating factors: None    Therapies tried and outcome: None     Patient reports he has been feeling fatigued past few months.  States he feels more tired than usual in the morning, like he didn't get enough sleep.  Reports some weight gain over past year. No change in diet/ exercise.  States he had mild amount blood in stool and while wiping 3 months ago. Stopped after 3 days. He was constipated at the time.  Mood is good, not feeling anxious/ depressed.  Sleeping less than usual and waking up more than usual at night.   No reason for waking up more than usual at night; not urinating more than usual, not feeling anxious.  He snores, but this is a chronic issue.  New life stress: got a puppy 4 months ago. No other life stressors. Not waking up because of dog.  Denies  skin/ hair changes, bradycardia, palpitations, CP, SOB, orthopnea, leg swelling, melena, heartburn, abdominal pain, or any other symptoms.    Family history:  - dad: colon CA (dx age 50)  - mom: hypothyroid  - sister: hypothyroid  - 2 brothers: hypothyroid  - no other family hx cancer    2. Skin lesion  Patient noticed spot on upper back, near neck a few weeks ago.  Picked at the spot a lot in the first two weeks but hasn't been picking at it since  Not painful or itchy or producing discharge  States it has grown  "in size  Has appointment with dermatologist this afternoon for evaluation of this spot        Problem list and histories reviewed & adjusted, as indicated.  Additional history: as documented    Patient Active Problem List   Diagnosis     Allergic rhinitis     Hypertrophy of breast     Lumbago     Nonallopathic lesion of sacral region     Nonallopathic lesion of lower extremities     CARDIOVASCULAR SCREENING; LDL GOAL LESS THAN 160     Brachial neuritis or radiculitis     Past Surgical History:   Procedure Laterality Date     VASECTOMY         Social History   Substance Use Topics     Smoking status: Never Smoker     Smokeless tobacco: Never Used     Alcohol use 0.6 oz/week     1 Standard drinks or equivalent per week     Family History   Problem Relation Age of Onset     Cancer - colorectal Father       at about age 74--dx at around age 50     Diabetes Mother      Thyroid Disease Mother      Diabetes Maternal Grandmother      Diabetes Brother      44     Thyroid Disease Sister      Thyroid Disease Brother      Thyroid Disease Brother      Cerebrovascular Disease Maternal Grandfather          Current Outpatient Prescriptions   Medication Sig Dispense Refill     escitalopram (LEXAPRO) 20 MG tablet TAKE 1 TABLET BY MOUTH ONCE DAILY 90 tablet 1     IBUPROFEN PO        Allergies   Allergen Reactions     Seasonal Allergies        Reviewed and updated as needed this visit by clinical staff  Tobacco  Allergies  Meds  Problems  Med Hx  Surg Hx  Fam Hx  Soc Hx        Reviewed and updated as needed this visit by Provider  Allergies  Meds  Problems  Med Hx         ROS:  Constitutional, HEENT, cardiovascular, pulmonary, gi and gu systems are negative, except as otherwise noted.    OBJECTIVE:     /84 (BP Location: Right arm, Patient Position: Chair, Cuff Size: Adult Regular)  Pulse 89  Temp 98  F (36.7  C) (Oral)  Resp 16  Ht 5' 7.5\" (1.715 m)  Wt 201 lb (91.2 kg)  SpO2 95%  BMI 31.02 kg/m2  Body " mass index is 31.02 kg/(m^2).  GENERAL: healthy, alert and no distress  EYES: Eyes grossly normal to inspection, PERRL and conjunctivae and sclerae normal  HENT: ear canals and TM's normal, nose and mouth without ulcers or lesions. No goiter or thyroid masses.  NECK: no adenopathy, no asymmetry, masses, or scars and thyroid normal to palpation  RESP: lungs clear to auscultation - no rales, rhonchi or wheezes  CV: regular rate and rhythm, normal S1 S2, no S3 or S4, no murmur, click or rub, no peripheral edema and peripheral pulses strong  ABDOMEN: soft, nontender, no hepatosplenomegaly, no masses and bowel sounds normal  MS: no gross musculoskeletal defects noted, no edema  SKIN: raised, pearly papule with scabbing on center of upper back near the neck.  PSYCH: mentation appears normal, affect normal/bright    Diagnostic Test Results:  Results for orders placed or performed in visit on 08/15/18 (from the past 24 hour(s))   CBC with platelets   Result Value Ref Range    WBC 6.6 4.0 - 11.0 10e9/L    RBC Count 4.92 4.4 - 5.9 10e12/L    Hemoglobin 15.1 13.3 - 17.7 g/dL    Hematocrit 42.4 40.0 - 53.0 %    MCV 86 78 - 100 fl    MCH 30.7 26.5 - 33.0 pg    MCHC 35.6 31.5 - 36.5 g/dL    RDW 12.6 10.0 - 15.0 %    Platelet Count 275 150 - 450 10e9/L       ASSESSMENT/PLAN:     1. Other fatigue  2. Family history of thyroid disorder   Patient has been fatigued for past couple of months. Having issues with sleep; some blood in stool a few weeks ago.  Family hx of hypothyroidism.   Unclear etiology at this point.    Will obtain CBC, CMP, TSH today. If labs are normal, will consider referral to sleep clinic for evaluation  - CBC with platelets  - Comprehensive metabolic panel  - TSH with free T4 reflex    3. Skin lesion  Raised pearly papule with scabbing on upper back. Appearance consistent with either irritated skin tag or possible basal cell carcinoma.  Patient is seeing dermatologist later today for evaluation of this  lesion.    4. Need for hepatitis C screening test  - Hepatitis C Screen Reflex to HCV RNA Quant and Genotype    I have discussed the patient's presenting complaint(s) with the physician assistant student and agree with the history, physical exam and plan as documented above. Her progress note reflects our assessment and plan.    Risks, benefits and alternatives were discussed with patient. Agreeable to the plan of care.    Ingrid Nuñez PA-C  [facility]

## 2018-08-15 NOTE — LETTER
8/15/2018         RE: Castillo Allen  [address]  [place] [address]        Dear Colleague,    Thank you for referring your patient, Castillo Allen, to the [facility]. Please see a copy of my visit note below.    [facility] - PRIMARY CARE SKIN    CC : Lesion(s)  SUBJECTIVE:                                                    Castillo Allen is a 53 year old male who presents to clinic today because of a spot on the upper back and posterior neck that started w months ago.    Bothersome lesions noticed by the patient or other skin concerns :  Issue One : A growth on the upper back appeared like a pimple at onset. He had tried scratching it off and popping it, but the lesion has persisted.  Onset : 2 months ago.  Enlarging : YES.  Bleeding : NO  Itchy or irritating : YES.  Pain or tenderness : NO.  Changing color : NO.    Personal history of skin cancer : NO.  History of pilar cysts  Family history of skin cancer : NO.    Refer to electronic medical record (EMR) for past medical history and medications.    INTEGUMENTARY/SKIN: POSITIVE for non-healing lesion  ROS : 14 point review of systems was negative except the symptoms listed above in the HPI.    This document serves as a record of the services and decisions personally performed and made by Esperanza Monahan MD. It was created on her behalf by Vladimir Galvin, a trained medical scribe.  The creation of this document is based on the scribe's personal observations and the provider's statements to the medical scribe.  Vladimir Galvin, August 15, 2018 1:55 PM      OBJECTIVE:                                                    GENERAL: healthy, alert and no distress  SKIN: Sanchez Skin Type - III-II.  Neck and Trunk were examined. The dermatoscope was used to help evaluate pigmented lesions.  Skin Pertinent Findings:  Back, midline, at T1 : 5 mm in size raised erythematous lesion with superficial erosion and rolled borders. ? Basal cell  "carcinoma ? Other     Diagnostic Test Results:  none           ASSESSMENT:                                                      Encounter Diagnosis   Name Primary?     Neoplasm of uncertain behavior of skin Yes         PLAN:                                                    Patient Instructions   FUTURE APPOINTMENTS  Follow up per pathology report.    WOUND CARE INSTRUCTIONS  1. Wash hands before every dressing change.  2. After 24 hours, change dressing daily.  3. Wash the wound area with a mild soap, then rinse.  4. Gently pat dry with a sterile gauze or Q-tip.  5. Using a Q-tip, apply Vaseline or Aquaphor only over entire wound. Do NOT use Neosporin - as many people react to neomycin.  6. Finally, cover with a bandage or sterile non-stick gauze with micropore paper tape.  7. Repeat once daily until wound has healed.      Soap, water and shampoo will not hurt this area.    Do not go swimming or take baths, but showering is encouraged.    Limit use of the area where the procedure was done for a few days to allow for optimal healing.    If you experience bleeding:  Wash hands and hold firm pressure on the area for 10 minutes without checking to see if the bleeding has stopped. \"Checking\" pulls off the protective wound clot and restarts the bleeding all over again. Re-apply pressure for 10 minutes if necessary to stop bleeding.  Use additional sterile gauze and tape to maintain pressure once bleeding has stopped.  If bleeding continues, then call back to clinic at [phone].    Signs of Infection:  Infection can occur in any area where skin has been disrupted.  If you notice persistent redness, swelling, colored drainage, increasing pain, fever or other signs of infection, please call us at: [phone] and ask to have me or my colleague paged. We will call you back to discuss.    Pathology Results:  You will be notified, generally via letter or MyChart, in approximately 10 days. If there is anything we need " to discuss or further treatment needed, I will call you to discuss it.    PATIENT INFORMATION : WOUNDS  During the healing process you will notice a number of changes. All wounds develop a small halo of redness surrounding the wound.  This means healing is occurring. Severe itching with extensive redness usually indicates sensitivity to the ointment or bandage tape used to dress the wound.  You should call our office if this develops.      Swelling  and/or discoloration around your surgical site is common, particularly when performed around the eye.    All wounds normally drain.  The larger the wound the more drainage there will be.  After 7-10 days, you will notice the wound beginning to shrink and new skin will begin to grow.  The wound is healed when you can see skin has formed over the entire area.  A healed wound has a healthy, shiny look to the surface and is red to dark pink in color to normalize.  Wounds may take approximately 4-6 weeks to heal.  Larger wounds may take 6-8 weeks. After the wound is healed you may discontinue dressing changes.    You may experience a sensation of tightness as your wound heals. This is normal and will gradually subside.    Your healed wound may be sensitive to temperature changes. This sensitivity improves with time, but if you re having a lot of discomfort, try to avoid temperature extremes.    Patients frequently experience itching after their wound appears to have healed because of the continue healing under the skin.  Plain Vaseline will help relieve the itching.          PROCEDURES:                                                    Name : Shave Excision  Indication : Excision of tissue for pathology evaluation.  Location(s) : Back, midline, at T1 : 5 mm in size raised erythematous lesion with superficial erosion and rolled borders. ? Basal cell carcinoma ? Other .  Completed by : Esperanza Monahan MD  Photo Taken : no.  Anesthesia : Patient was anesthetized by infiltrating  the area surrounding the lesion with 1% lidocaine.   epinephrine [id number] : Yes.  Buffered with bicarbonate : Yes.  Note : Discussed the risk of pain, infection, scarring, hypo- or hyperpigmentation and recurrence or need for re-treatment. The benefits of treatment and alternative treatments were also discussed.    During this procedure, the universal protocol was utilized. The patient's identity was confirmed by no less than two patient identifiers, correct procedure was verified, correct site was verified and marked as applicable and a final pause was completed.    Sterile technique was used throughout the procedure. The skin was cleaned and prepped with surgical cleanser. Once adequate anesthesia was obtained, the lesion was removed with a deep scallop shave procedure. The specimen was sent to pathology.    Direct pressure and aluminum chloride and monopolar cautery was applied for hemostasis. No bleeding was present upon the completion of the procedure. The wound was coated with antibacterial ointment. A dry sterile dressing was applied. Patient tolerated the procedure well and left in satisfactory condition.    Primary provider and referring provider will be informed regarding the tissue report when it returns.      The information in this document, created by the medical scribe for me, accurately reflects the services I personally performed and the decisions made by me. I have reviewed and approved this document for accuracy prior to leaving the patient care area.  Esperanza Monahan MD August 15, 2018 1:55 PM  [facility]    Again, thank you for allowing me to participate in the care of your patient.        Sincerely,        Brianna Monahan MD

## 2018-08-15 NOTE — MR AVS SNAPSHOT
"              After Visit Summary   8/15/2018    Castillo Allen    MRN: [id number]           Patient Information     Date Of Birth          1965        Visit Information        Provider Department      8/15/2018 2:00 PM Brianna Monahan MD [facility]        Today's Diagnoses     Neoplasm of uncertain behavior of skin    -  1      Care Instructions    FUTURE APPOINTMENTS  Follow up per pathology report.    WOUND CARE INSTRUCTIONS  1. Wash hands before every dressing change.  2. After 24 hours, change dressing daily.  3. Wash the wound area with a mild soap, then rinse.  4. Gently pat dry with a sterile gauze or Q-tip.  5. Using a Q-tip, apply Vaseline or Aquaphor only over entire wound. Do NOT use Neosporin - as many people react to neomycin.  6. Finally, cover with a bandage or sterile non-stick gauze with micropore paper tape.  7. Repeat once daily until wound has healed.      Soap, water and shampoo will not hurt this area.    Do not go swimming or take baths, but showering is encouraged.    Limit use of the area where the procedure was done for a few days to allow for optimal healing.    If you experience bleeding:  Wash hands and hold firm pressure on the area for 10 minutes without checking to see if the bleeding has stopped. \"Checking\" pulls off the protective wound clot and restarts the bleeding all over again. Re-apply pressure for 10 minutes if necessary to stop bleeding.  Use additional sterile gauze and tape to maintain pressure once bleeding has stopped.  If bleeding continues, then call back to clinic at [phone].    Signs of Infection:  Infection can occur in any area where skin has been disrupted.  If you notice persistent redness, swelling, colored drainage, increasing pain, fever or other signs of infection, please call us at: [phone] and ask to have me or my colleague paged. We will call you back to discuss.    Pathology Results:  You will be notified, " generally via letter or MyChart, in approximately 10 days. If there is anything we need to discuss or further treatment needed, I will call you to discuss it.    PATIENT INFORMATION : WOUNDS  During the healing process you will notice a number of changes. All wounds develop a small halo of redness surrounding the wound.  This means healing is occurring. Severe itching with extensive redness usually indicates sensitivity to the ointment or bandage tape used to dress the wound.  You should call our office if this develops.      Swelling  and/or discoloration around your surgical site is common, particularly when performed around the eye.    All wounds normally drain.  The larger the wound the more drainage there will be.  After 7-10 days, you will notice the wound beginning to shrink and new skin will begin to grow.  The wound is healed when you can see skin has formed over the entire area.  A healed wound has a healthy, shiny look to the surface and is red to dark pink in color to normalize.  Wounds may take approximately 4-6 weeks to heal.  Larger wounds may take 6-8 weeks. After the wound is healed you may discontinue dressing changes.    You may experience a sensation of tightness as your wound heals. This is normal and will gradually subside.    Your healed wound may be sensitive to temperature changes. This sensitivity improves with time, but if you re having a lot of discomfort, try to avoid temperature extremes.    Patients frequently experience itching after their wound appears to have healed because of the continue healing under the skin.  Plain Vaseline will help relieve the itching.            Follow-ups after your visit        Who to contact     If you have questions or need follow up information about today's clinic visit or your schedule please contact [facility] directly at [phone].  Normal or non-critical lab and imaging results will be communicated to you by MyChart, letter or  phone within 4 business days after the clinic has received the results. If you do not hear from us within 7 days, please contact the clinic through Stand In or phone. If you have a critical or abnormal lab result, we will notify you by phone as soon as possible.  Submit refill requests through Stand In or call your pharmacy and they will forward the refill request to us. Please allow 3 business days for your refill to be completed.          Additional Information About Your Visit        [employer] Information     Stand In gives you secure access to your electronic health record. If you see a primary care provider, you can also send messages to your care team and make appointments. If you have questions, please call your primary care clinic.  If you do not have a primary care provider, please call [phone] and they will assist you.        Care EveryWhere ID     This is your Care EveryWhere ID. This could be used by other organizations to access your [facility] medical records  [id number]        Your Vitals Were     Pulse Pulse Oximetry                94 98%           Blood Pressure from Last 3 Encounters:   08/15/18 121/80   08/15/18 134/84   03/03/18 122/74    Weight from Last 3 Encounters:   08/15/18 201 lb (91.2 kg)   10/12/17 185 lb (83.9 kg)   09/13/17 185 lb (83.9 kg)              We Performed the Following     Dermatological path order and indications     SHAV SKIN LESION TRUNK/ARM/LEG <=0.5 CM        Primary Care Provider Office Phone # Fax #    Ingrid Nuñez PA-C [phone]       [address] [address] [facility] [address]        Equal Access to Services     [facility] AH: Hadii aad ku hadasho Soomaali, waaxda luqadaha, qaybta kaalmada adeegyashaina, irineo givens . So [facility] [phone].    ATENCIÓN: Si habla español, tiene a pulliam disposición servicios gratuitos de asistencia lingüística. Llame al [phone].    We comply with applicable federal civil rights laws and  [place] laws. We do not discriminate on the basis of race, color, national origin, age, disability, sex, sexual orientation, or gender identity.            Thank you!     Thank you for choosing [facility]  for your care. Our goal is always to provide you with excellent care. Hearing back from our patients is one way we can continue to improve our services. Please take a few minutes to complete the written survey that you may receive in the mail after your visit with us. Thank you!             Your Updated Medication List - Protect others around you: Learn how to safely use, store and throw away your medicines at www.disposemymeds.org.          This list is accurate as of 8/15/18  2:01 PM.  Always use your most recent med list.                   Brand Name Dispense Instructions for use Diagnosis    escitalopram 20 MG tablet    LEXAPRO    90 tablet    TAKE 1 TABLET BY MOUTH ONCE DAILY    Adjustment disorder with mixed anxiety and depressed mood       IBUPROFEN PO

## 2018-08-15 NOTE — PATIENT INSTRUCTIONS
"FUTURE APPOINTMENTS  Follow up per pathology report.    WOUND CARE INSTRUCTIONS  1. Wash hands before every dressing change.  2. After 24 hours, change dressing daily.  3. Wash the wound area with a mild soap, then rinse.  4. Gently pat dry with a sterile gauze or Q-tip.  5. Using a Q-tip, apply Vaseline or Aquaphor only over entire wound. Do NOT use Neosporin - as many people react to neomycin.  6. Finally, cover with a bandage or sterile non-stick gauze with micropore paper tape.  7. Repeat once daily until wound has healed.      Soap, water and shampoo will not hurt this area.    Do not go swimming or take baths, but showering is encouraged.    Limit use of the area where the procedure was done for a few days to allow for optimal healing.    If you experience bleeding:  Wash hands and hold firm pressure on the area for 10 minutes without checking to see if the bleeding has stopped. \"Checking\" pulls off the protective wound clot and restarts the bleeding all over again. Re-apply pressure for 10 minutes if necessary to stop bleeding.  Use additional sterile gauze and tape to maintain pressure once bleeding has stopped.  If bleeding continues, then call back to clinic at [phone].    Signs of Infection:  Infection can occur in any area where skin has been disrupted.  If you notice persistent redness, swelling, colored drainage, increasing pain, fever or other signs of infection, please call us at: [phone] and ask to have me or my colleague paged. We will call you back to discuss.    Pathology Results:  You will be notified, generally via letter or MyChart, in approximately 10 days. If there is anything we need to discuss or further treatment needed, I will call you to discuss it.    PATIENT INFORMATION : WOUNDS  During the healing process you will notice a number of changes. All wounds develop a small halo of redness surrounding the wound.  This means healing is occurring. Severe itching with extensive " redness usually indicates sensitivity to the ointment or bandage tape used to dress the wound.  You should call our office if this develops.      Swelling  and/or discoloration around your surgical site is common, particularly when performed around the eye.    All wounds normally drain.  The larger the wound the more drainage there will be.  After 7-10 days, you will notice the wound beginning to shrink and new skin will begin to grow.  The wound is healed when you can see skin has formed over the entire area.  A healed wound has a healthy, shiny look to the surface and is red to dark pink in color to normalize.  Wounds may take approximately 4-6 weeks to heal.  Larger wounds may take 6-8 weeks. After the wound is healed you may discontinue dressing changes.    You may experience a sensation of tightness as your wound heals. This is normal and will gradually subside.    Your healed wound may be sensitive to temperature changes. This sensitivity improves with time, but if you re having a lot of discomfort, try to avoid temperature extremes.    Patients frequently experience itching after their wound appears to have healed because of the continue healing under the skin.  Plain Vaseline will help relieve the itching.

## 2018-08-15 NOTE — MR AVS SNAPSHOT
After Visit Summary   8/15/2018    Castillo Allen    MRN: [id number]           Patient Information     Date Of Birth          1965        Visit Information        Provider Department      8/15/2018 8:50 AM Ingrid Nuñez PA-C [facility]        Today's Diagnoses     Other fatigue    -  1    Family history of thyroid disorder        Need for hepatitis C screening test        Skin lesion           Follow-ups after your visit        Follow-up notes from your care team     Return in about 3 months (around 11/15/2018) for Physical Exam.      Your next 10 appointments already scheduled     Aug 15, 2018  2:00 PM CDT   Office Visit with Brianna Monahan MD   [facility]    [address] [place] [id number] [phone]           Bring a current list of meds and any records pertaining to this visit. For Physicals, please bring immunization records and any forms needing to be filled out. Please arrive 10 minutes early to complete paperwork.              Who to contact     If you have questions or need follow up information about today's clinic visit or your schedule please contact [facility] directly at [phone].  Normal or non-critical lab and imaging results will be communicated to you by MyChart, letter or phone within 4 business days after the clinic has received the results. If you do not hear from us within 7 days, please contact the clinic through [employer] or phone. If you have a critical or abnormal lab result, we will notify you by phone as soon as possible.  Submit refill requests through [employer]Me or call your pharmacy and they will forward the refill request to us. Please allow 3 business days for your refill to be completed.          Additional Information About Your Visit        [employer] Information     [employer] gives you secure access to your electronic health record. If  "you see a primary care provider, you can also send messages to your care team and make appointments. If you have questions, please call your primary care clinic.  If you do not have a primary care provider, please call [phone] and they will assist you.        Care EveryWhere ID     This is your Care EveryWhere ID. This could be used by other organizations to access your [facility] medical records  [id number]        Your Vitals Were     Pulse Temperature Respirations Height Pulse Oximetry BMI (Body Mass Index)    89 98  F (36.7  C) (Oral) 16 5' 7.5\" (1.715 m) 95% 31.02 kg/m2       Blood Pressure from Last 3 Encounters:   08/15/18 134/84   03/03/18 122/74   10/12/17 134/78    Weight from Last 3 Encounters:   08/15/18 201 lb (91.2 kg)   10/12/17 185 lb (83.9 kg)   09/13/17 185 lb (83.9 kg)              We Performed the Following     CBC with platelets     Comprehensive metabolic panel     Hepatitis C Screen Reflex to HCV RNA Quant and Genotype     TSH with free T4 reflex        Primary Care Provider Office Phone # Fax #    Ingrid Nuñez PA-C [phone] [address] [facility] [address]        Equal Access to Services     [facility] AH: Hadii aad ku hadasho Soomaali, waaxda luqadaha, qaybta kaalmada adeegyada, waxay idiin haytracyn prisca jansen labharti moore. So [facility] [phone].    ATENCIÓN: Si habla español, tiene a pulliam disposición servicios gratuitos de asistencia lingüística. Llame al [phone].    We comply with applicable federal civil rights laws and [place] laws. We do not discriminate on the basis of race, color, national origin, age, disability, sex, sexual orientation, or gender identity.            Thank you!     Thank you for choosing [facility]  for your care. Our goal is always to provide you with excellent care. Hearing back from our patients is one way we can continue to improve our services. Please take a few minutes to complete the written survey " that you may receive in the mail after your visit with us. Thank you!             Your Updated Medication List - Protect others around you: Learn how to safely use, store and throw away your medicines at www.disposemymeds.org.          This list is accurate as of 8/15/18  9:31 AM.  Always use your most recent med list.                   Brand Name Dispense Instructions for use Diagnosis    escitalopram 20 MG tablet    LEXAPRO    90 tablet    TAKE 1 TABLET BY MOUTH ONCE DAILY    Adjustment disorder with mixed anxiety and depressed mood       IBUPROFEN PO

## 2018-08-15 NOTE — PROGRESS NOTES
[facility] - PRIMARY CARE SKIN    CC : Lesion(s)  SUBJECTIVE:                                                    Castillo Allen is a 53 year old male who presents to clinic today because of a spot on the upper back and posterior neck that started w months ago.    Bothersome lesions noticed by the patient or other skin concerns :  Issue One : A growth on the upper back appeared like a pimple at onset. He had tried scratching it off and popping it, but the lesion has persisted.  Onset : 2 months ago.  Enlarging : YES.  Bleeding : NO  Itchy or irritating : YES.  Pain or tenderness : NO.  Changing color : NO.    Personal history of skin cancer : NO.  History of pilar cysts  Family history of skin cancer : NO.    Refer to electronic medical record (EMR) for past medical history and medications.    INTEGUMENTARY/SKIN: POSITIVE for non-healing lesion  ROS : 14 point review of systems was negative except the symptoms listed above in the HPI.    This document serves as a record of the services and decisions personally performed and made by Esperanza Monahan MD. It was created on her behalf by Vladimir Galvin, a trained medical scribe.  The creation of this document is based on the scribe's personal observations and the provider's statements to the medical scribe.  Vladimir Galvin, August 15, 2018 1:55 PM      OBJECTIVE:                                                    GENERAL: healthy, alert and no distress  SKIN: Sanchez Skin Type - III-II.  Neck and Trunk were examined. The dermatoscope was used to help evaluate pigmented lesions.  Skin Pertinent Findings:  Back, midline, at T1 : 5 mm in size raised erythematous lesion with superficial erosion and rolled borders. ? Basal cell carcinoma ? Other     Diagnostic Test Results:  none           ASSESSMENT:                                                      Encounter Diagnosis   Name Primary?     Neoplasm of uncertain behavior of skin Yes         PLAN:                                "                     Patient Instructions   FUTURE APPOINTMENTS  Follow up per pathology report.    WOUND CARE INSTRUCTIONS  1. Wash hands before every dressing change.  2. After 24 hours, change dressing daily.  3. Wash the wound area with a mild soap, then rinse.  4. Gently pat dry with a sterile gauze or Q-tip.  5. Using a Q-tip, apply Vaseline or Aquaphor only over entire wound. Do NOT use Neosporin - as many people react to neomycin.  6. Finally, cover with a bandage or sterile non-stick gauze with micropore paper tape.  7. Repeat once daily until wound has healed.      Soap, water and shampoo will not hurt this area.    Do not go swimming or take baths, but showering is encouraged.    Limit use of the area where the procedure was done for a few days to allow for optimal healing.    If you experience bleeding:  Wash hands and hold firm pressure on the area for 10 minutes without checking to see if the bleeding has stopped. \"Checking\" pulls off the protective wound clot and restarts the bleeding all over again. Re-apply pressure for 10 minutes if necessary to stop bleeding.  Use additional sterile gauze and tape to maintain pressure once bleeding has stopped.  If bleeding continues, then call back to clinic at [phone].    Signs of Infection:  Infection can occur in any area where skin has been disrupted.  If you notice persistent redness, swelling, colored drainage, increasing pain, fever or other signs of infection, please call us at: [phone] and ask to have me or my colleague paged. We will call you back to discuss.    Pathology Results:  You will be notified, generally via letter or MyChart, in approximately 10 days. If there is anything we need to discuss or further treatment needed, I will call you to discuss it.    PATIENT INFORMATION : WOUNDS  During the healing process you will notice a number of changes. All wounds develop a small halo of redness surrounding the wound.  This means healing " is occurring. Severe itching with extensive redness usually indicates sensitivity to the ointment or bandage tape used to dress the wound.  You should call our office if this develops.      Swelling  and/or discoloration around your surgical site is common, particularly when performed around the eye.    All wounds normally drain.  The larger the wound the more drainage there will be.  After 7-10 days, you will notice the wound beginning to shrink and new skin will begin to grow.  The wound is healed when you can see skin has formed over the entire area.  A healed wound has a healthy, shiny look to the surface and is red to dark pink in color to normalize.  Wounds may take approximately 4-6 weeks to heal.  Larger wounds may take 6-8 weeks. After the wound is healed you may discontinue dressing changes.    You may experience a sensation of tightness as your wound heals. This is normal and will gradually subside.    Your healed wound may be sensitive to temperature changes. This sensitivity improves with time, but if you re having a lot of discomfort, try to avoid temperature extremes.    Patients frequently experience itching after their wound appears to have healed because of the continue healing under the skin.  Plain Vaseline will help relieve the itching.          PROCEDURES:                                                    Name : Shave Excision  Indication : Excision of tissue for pathology evaluation.  Location(s) : Back, midline, at T1 : 5 mm in size raised erythematous lesion with superficial erosion and rolled borders. ? Basal cell carcinoma ? Other .  Completed by : Esperanza Monahan MD  Photo Taken : no.  Anesthesia : Patient was anesthetized by infiltrating the area surrounding the lesion with 1% lidocaine.   epinephrine [id number] : Yes.  Buffered with bicarbonate : Yes.  Note : Discussed the risk of pain, infection, scarring, hypo- or hyperpigmentation and recurrence or need for re-treatment. The benefits of  treatment and alternative treatments were also discussed.    During this procedure, the universal protocol was utilized. The patient's identity was confirmed by no less than two patient identifiers, correct procedure was verified, correct site was verified and marked as applicable and a final pause was completed.    Sterile technique was used throughout the procedure. The skin was cleaned and prepped with surgical cleanser. Once adequate anesthesia was obtained, the lesion was removed with a deep scallop shave procedure. The specimen was sent to pathology.    Direct pressure and aluminum chloride and monopolar cautery was applied for hemostasis. No bleeding was present upon the completion of the procedure. The wound was coated with antibacterial ointment. A dry sterile dressing was applied. Patient tolerated the procedure well and left in satisfactory condition.    Primary provider and referring provider will be informed regarding the tissue report when it returns.      The information in this document, created by the medical scribe for me, accurately reflects the services I personally performed and the decisions made by me. I have reviewed and approved this document for accuracy prior to leaving the patient care area.  Esperanza Monahan MD August 15, 2018 1:55 PM  [facility] [place]

## 2018-08-16 LAB
ALBUMIN SERPL-MCNC: 3.7 G/DL (ref 3.4–5)
ALP SERPL-CCNC: 79 U/L (ref 40–150)
ALT SERPL W P-5'-P-CCNC: 48 U/L (ref 0–70)
ANION GAP SERPL CALCULATED.3IONS-SCNC: 9 MMOL/L (ref 3–14)
AST SERPL W P-5'-P-CCNC: 25 U/L (ref 0–45)
BILIRUB SERPL-MCNC: 0.7 MG/DL (ref 0.2–1.3)
BUN SERPL-MCNC: 21 MG/DL (ref 7–30)
CALCIUM SERPL-MCNC: 8.6 MG/DL (ref 8.5–10.1)
CHLORIDE SERPL-SCNC: 103 MMOL/L (ref 94–109)
CO2 SERPL-SCNC: 28 MMOL/L (ref 20–32)
CREAT SERPL-MCNC: 0.86 MG/DL (ref 0.66–1.25)
GFR SERPL CREATININE-BSD FRML MDRD: >90 ML/MIN/1.7M2
GLUCOSE SERPL-MCNC: 128 MG/DL (ref 70–99)
HCV AB SERPL QL IA: NONREACTIVE
POTASSIUM SERPL-SCNC: 4.6 MMOL/L (ref 3.4–5.3)
PROT SERPL-MCNC: 6.5 G/DL (ref 6.8–8.8)
SODIUM SERPL-SCNC: 140 MMOL/L (ref 133–144)
TSH SERPL DL<=0.005 MIU/L-ACNC: 1.53 MU/L (ref 0.4–4)

## 2018-08-20 LAB — COPATH REPORT: NORMAL

## 2018-08-21 LAB — HBA1C MFR BLD: 5.3 % (ref 0–5.6)

## 2018-12-23 ENCOUNTER — OFFICE VISIT (OUTPATIENT)
Dept: URGENT CARE | Facility: URGENT CARE | Age: 53
End: 2018-12-23
Payer: COMMERCIAL

## 2018-12-23 VITALS
HEART RATE: 86 BPM | OXYGEN SATURATION: 97 % | TEMPERATURE: 98.4 F | SYSTOLIC BLOOD PRESSURE: 142 MMHG | DIASTOLIC BLOOD PRESSURE: 90 MMHG

## 2018-12-23 DIAGNOSIS — R07.0 THROAT PAIN: Primary | ICD-10-CM

## 2018-12-23 LAB
DEPRECATED S PYO AG THROAT QL EIA: NORMAL
SPECIMEN SOURCE: NORMAL

## 2018-12-23 PROCEDURE — 99213 OFFICE O/P EST LOW 20 MIN: CPT | Performed by: PHYSICIAN ASSISTANT

## 2018-12-23 PROCEDURE — 87081 CULTURE SCREEN ONLY: CPT | Performed by: PHYSICIAN ASSISTANT

## 2018-12-23 PROCEDURE — 87880 STREP A ASSAY W/OPTIC: CPT | Performed by: PHYSICIAN ASSISTANT

## 2018-12-23 ASSESSMENT — ENCOUNTER SYMPTOMS
COUGH: 1
NAUSEA: 0
CHILLS: 0
DIARRHEA: 0
FEVER: 0
SORE THROAT: 1
VOMITING: 0

## 2018-12-23 NOTE — PROGRESS NOTES
SUBJECTIVE:   Castillo Allen is a 53 year old male presenting with a chief complaint of   Chief Complaint   Patient presents with     Urgent Care     Pharyngitis     Possible strep x2-3 days- sore throat, Lt ear pain, post nasal drip, cough. Had chills, fatigue, nasal congestion, Cx congestion -days ago but got resolved       He is an established patient of [facility].    URI Adult    Onset of symptoms was 2 day(s) ago.  Course of illness is same.    Severity moderate  Current and Associated symptoms: Sore throat, left ear pain, slight cough, fatigue  Treatment measures tried include Tylenol/Ibuprofen.  Predisposing factors include None.  Denies f/c/n/v/d. Had nasal congestion 3 days ago, now resolved.      Review of Systems   Constitutional: Negative for chills and fever.   HENT: Positive for congestion (resolved), ear pain (left ear) and sore throat. Negative for ear discharge.    Respiratory: Positive for cough.    Gastrointestinal: Negative for diarrhea, nausea and vomiting.       Past Medical History:   Diagnosis Date     Allergic rhinitis, cause unspecified      Family History   Problem Relation Age of Onset     Cancer - colorectal Father          at about age 74--dx at around age 50     Diabetes Mother      Thyroid Disease Mother      Diabetes Maternal Grandmother      Diabetes Brother         44     Thyroid Disease Sister      Thyroid Disease Brother      Thyroid Disease Brother      Cerebrovascular Disease Maternal Grandfather      Current Outpatient Medications   Medication Sig Dispense Refill     IBUPROFEN PO        escitalopram (LEXAPRO) 20 MG tablet TAKE 1 TABLET BY MOUTH ONCE DAILY (Patient not taking: Reported on 2018) 90 tablet 1     Social History     Tobacco Use     Smoking status: Never Smoker     Smokeless tobacco: Never Used   Substance Use Topics     Alcohol use: Yes     Alcohol/week: 0.6 oz     Types: 1 Standard drinks or equivalent per week       OBJECTIVE  /90 (BP Location:  Right arm, Patient Position: Chair, Cuff Size: Adult Regular)   Pulse 86   Temp 98.4  F (36.9  C) (Oral)   SpO2 97%     Physical Exam   Constitutional: He appears well-developed and well-nourished. No distress.   HENT:   Head: Normocephalic and atraumatic.   Right Ear: Tympanic membrane normal.   Left Ear: Tympanic membrane normal.   Mouth/Throat: Oropharynx is clear and moist.   Eyes: Conjunctivae are normal.   Neck: Normal range of motion.   Cardiovascular: Regular rhythm and normal heart sounds.   Pulmonary/Chest: Effort normal and breath sounds normal. No respiratory distress.   Neurological: He is alert.   Skin: Skin is warm and dry.   Psychiatric: He has a normal mood and affect.   Nursing note and vitals reviewed.      Labs:  Results for orders placed or performed in visit on 12/23/18 (from the past 24 hour(s))   Rapid strep screen   Result Value Ref Range    Specimen Description Throat     Rapid Strep A Screen       NEGATIVE: No Group A streptococcal antigen detected by immunoassay, await culture report.           ASSESSMENT:      ICD-10-CM    1. Throat pain R07.0 Rapid strep screen     Beta strep group A culture        Medical Decision Making:    Differential Diagnosis:  URI Adult/Peds:  Strep pharyngitis, Viral pharyngitis, Viral syndrome and Viral upper respiratory illness    Serious Comorbid Conditions:  Adult:  None    PLAN:    Acute pharyngitis: Rapid strep is negative today.  Throat culture is pending.  Supportive care measures advised (Tylenol or Motrin, salt water gargles, throat lozenges etc).  We will communicate any positive finding on the throat culture result.  Follow-up if any worsening symptoms.  Patient understands and agrees with the plan.      Followup:    If not improving or if condition worsens, follow up with your Primary Care Provider

## 2018-12-24 ENCOUNTER — ALLIED HEALTH/NURSE VISIT (OUTPATIENT)
Dept: NURSING | Facility: CLINIC | Age: 53
End: 2018-12-24
Payer: COMMERCIAL

## 2018-12-24 DIAGNOSIS — Z11.1 VISIT FOR MANTOUX TEST: Primary | ICD-10-CM

## 2018-12-24 LAB
BACTERIA SPEC CULT: NORMAL
SPECIMEN SOURCE: NORMAL

## 2018-12-24 PROCEDURE — 99207 ZZC NO CHARGE NURSE ONLY: CPT

## 2018-12-24 PROCEDURE — 86580 TB INTRADERMAL TEST: CPT

## 2018-12-26 ENCOUNTER — ALLIED HEALTH/NURSE VISIT (OUTPATIENT)
Dept: NURSING | Facility: CLINIC | Age: 53
End: 2018-12-26
Payer: COMMERCIAL

## 2018-12-26 DIAGNOSIS — Z11.1 SCREENING EXAMINATION FOR PULMONARY TUBERCULOSIS: Primary | ICD-10-CM

## 2018-12-26 LAB
PPDINDURATION: 0 MM (ref 0–5)
PPDREDNESS: 0 MM

## 2018-12-26 PROCEDURE — 99207 ZZC NO CHARGE NURSE ONLY: CPT

## 2019-08-13 NOTE — PROGRESS NOTES
Subjective     Castillo Allen is a 54 year old male who presents to clinic today for the following health issues:    HPI   Fatigue      Duration: 1-2 months    Description (location/character/radiation): patient notes that he is feeling mildly fatigued recently, has a busy life and says he doesn't get enough sleep; wife wanted him checked    Intensity:  mild    Accompanying signs and symptoms: has lost weight but says is due to exercising and trying to lose weight; mild concentration concerns (thinks due to lack of sleep)    History (similar episodes/previous evaluation): fhx of thyroid problems, mostly hypothyroid: mom, sister, 2 brothers    Patient notes he also has family hx of diabetes, has no concerns but would like A1C checked just in case, is fasting    Precipitating or alleviating factors: None    Therapies tried and outcome: None     Patient is here today for evaluation of fatigue   He doesn't really feel himself to be fatigued but he notes his wife sent him in as she has noticed he seems more fatigued, less likely to want to do things  He denies chest pain, shortness of breath, racing heart, abdominal pain, blood in stools, changes to stools, hoarse voice, swelling in the neck  No changes to skin  Has family hx of diabetes and thyroid  Denies depression or anxiety  Has worked on dietary changes to reduce weight, down 20lsb since last year intentionally  In regards to sleep, he sleeps well  No known snoring, not waking up at night  He wakes up to an alarm and is a little fatigued but fine as day progresses  Not falling asleep in the car    Patient Active Problem List   Diagnosis     Allergic rhinitis     Hypertrophy of breast     Lumbago     Nonallopathic lesion of sacral region     Nonallopathic lesion of lower extremities     CARDIOVASCULAR SCREENING; LDL GOAL LESS THAN 160     Brachial neuritis or radiculitis     Past Surgical History:   Procedure Laterality Date     VASECTOMY         Social History  "    Tobacco Use     Smoking status: Never Smoker     Smokeless tobacco: Never Used   Substance Use Topics     Alcohol use: Yes     Alcohol/week: 0.6 oz     Types: 1 Standard drinks or equivalent per week     Family History   Problem Relation Age of Onset     Cancer - colorectal Father          at about age 74--dx at around age 50     Diabetes Mother      Thyroid Disease Mother      Diabetes Maternal Grandmother      Diabetes Brother         44     Thyroid Disease Brother      Thyroid Disease Sister      Thyroid Disease Brother      Cerebrovascular Disease Maternal Grandfather      No Known Problems Sister          Current Outpatient Medications   Medication Sig Dispense Refill     IBUPROFEN PO Take by mouth as needed        Allergies   Allergen Reactions     Seasonal Allergies        Reviewed and updated as needed this visit by Provider  Meds         Review of Systems   ROS COMP: Constitutional, HEENT, cardiovascular, pulmonary, gi and gu systems are negative, except as otherwise noted.      Objective    /74 (BP Location: Right arm, Patient Position: Chair, Cuff Size: Adult Regular)   Pulse 86   Temp 97.9  F (36.6  C) (Oral)   Resp 16   Ht 1.715 m (5' 7.5\")   Wt 83.7 kg (184 lb 8 oz)   SpO2 96%   BMI 28.47 kg/m    Body mass index is 28.47 kg/m .  Physical Exam   GENERAL: healthy, alert and no distress  EYES: Eyes grossly normal to inspection, PERRL and conjunctivae and sclerae normal  HENT: ear canals and TM's normal, nose and mouth without ulcers or lesions  NECK: no adenopathy, no asymmetry, masses, or scars and thyroid normal to palpation  RESP: lungs clear to auscultation - no rales, rhonchi or wheezes  CV: regular rate and rhythm, normal S1 S2, no S3 or S4, no murmur, click or rub, no peripheral edema and peripheral pulses strong  ABDOMEN: soft, nontender, no hepatosplenomegaly, no masses and bowel sounds normal  MS: no gross musculoskeletal defects noted, no edema    Diagnostic Test " Results:  none         Assessment & Plan     1. Other fatigue  New problem, update labs today.  Will recommend since this is the second year in a row at this time has come in for fatigue, follow up with Sleep Medicine to see if there is a sleep cause to his fatigue.  - CBC with platelets  - Comprehensive metabolic panel  - SLEEP EVALUATION & MANAGEMENT REFERRAL - ADULT -[facility] [place]  [phone] (Age 18 and up); Future    2. Family history of thyroid disease  - TSH with free T4 reflex    3. Family history of diabetes mellitus  - Hemoglobin A1c     Risks, benefits and alternatives were discussed with patient. Agreeable to the plan of care.    Return in about 6 months (around 2/14/2020) for Physical Exam.    Ingrid Nuñez PA-C  [facility]

## 2019-08-14 ENCOUNTER — OFFICE VISIT (OUTPATIENT)
Dept: FAMILY MEDICINE | Facility: CLINIC | Age: 54
End: 2019-08-14
Payer: COMMERCIAL

## 2019-08-14 VITALS
OXYGEN SATURATION: 96 % | DIASTOLIC BLOOD PRESSURE: 74 MMHG | WEIGHT: 184.5 LBS | SYSTOLIC BLOOD PRESSURE: 126 MMHG | HEIGHT: 68 IN | RESPIRATION RATE: 16 BRPM | BODY MASS INDEX: 27.96 KG/M2 | HEART RATE: 86 BPM | TEMPERATURE: 97.9 F

## 2019-08-14 DIAGNOSIS — R53.83 OTHER FATIGUE: Primary | ICD-10-CM

## 2019-08-14 DIAGNOSIS — Z83.49 FAMILY HISTORY OF THYROID DISEASE: ICD-10-CM

## 2019-08-14 DIAGNOSIS — Z83.3 FAMILY HISTORY OF DIABETES MELLITUS: ICD-10-CM

## 2019-08-14 LAB
ALBUMIN SERPL-MCNC: 3.7 G/DL (ref 3.4–5)
ALP SERPL-CCNC: 88 U/L (ref 40–150)
ALT SERPL W P-5'-P-CCNC: 27 U/L (ref 0–70)
ANION GAP SERPL CALCULATED.3IONS-SCNC: 5 MMOL/L (ref 3–14)
AST SERPL W P-5'-P-CCNC: 18 U/L (ref 0–45)
BILIRUB SERPL-MCNC: 0.9 MG/DL (ref 0.2–1.3)
BUN SERPL-MCNC: 16 MG/DL (ref 7–30)
CALCIUM SERPL-MCNC: 8.2 MG/DL (ref 8.5–10.1)
CHLORIDE SERPL-SCNC: 108 MMOL/L (ref 94–109)
CO2 SERPL-SCNC: 27 MMOL/L (ref 20–32)
CREAT SERPL-MCNC: 0.87 MG/DL (ref 0.66–1.25)
ERYTHROCYTE [DISTWIDTH] IN BLOOD BY AUTOMATED COUNT: 12.4 % (ref 10–15)
GFR SERPL CREATININE-BSD FRML MDRD: >90 ML/MIN/{1.73_M2}
GLUCOSE SERPL-MCNC: 107 MG/DL (ref 70–99)
HBA1C MFR BLD: 5 % (ref 0–5.6)
HCT VFR BLD AUTO: 42.7 % (ref 40–53)
HGB BLD-MCNC: 15.3 G/DL (ref 13.3–17.7)
MCH RBC QN AUTO: 30.7 PG (ref 26.5–33)
MCHC RBC AUTO-ENTMCNC: 35.8 G/DL (ref 31.5–36.5)
MCV RBC AUTO: 86 FL (ref 78–100)
PLATELET # BLD AUTO: 263 10E9/L (ref 150–450)
POTASSIUM SERPL-SCNC: 4.2 MMOL/L (ref 3.4–5.3)
PROT SERPL-MCNC: 6.6 G/DL (ref 6.8–8.8)
RBC # BLD AUTO: 4.99 10E12/L (ref 4.4–5.9)
SODIUM SERPL-SCNC: 140 MMOL/L (ref 133–144)
TSH SERPL DL<=0.005 MIU/L-ACNC: 1.71 MU/L (ref 0.4–4)
WBC # BLD AUTO: 7.4 10E9/L (ref 4–11)

## 2019-08-14 PROCEDURE — 36415 COLL VENOUS BLD VENIPUNCTURE: CPT | Performed by: PHYSICIAN ASSISTANT

## 2019-08-14 PROCEDURE — 84443 ASSAY THYROID STIM HORMONE: CPT | Performed by: PHYSICIAN ASSISTANT

## 2019-08-14 PROCEDURE — 80053 COMPREHEN METABOLIC PANEL: CPT | Performed by: PHYSICIAN ASSISTANT

## 2019-08-14 PROCEDURE — 85027 COMPLETE CBC AUTOMATED: CPT | Performed by: PHYSICIAN ASSISTANT

## 2019-08-14 PROCEDURE — 83036 HEMOGLOBIN GLYCOSYLATED A1C: CPT | Performed by: PHYSICIAN ASSISTANT

## 2019-08-14 PROCEDURE — 99214 OFFICE O/P EST MOD 30 MIN: CPT | Performed by: PHYSICIAN ASSISTANT

## 2019-08-14 ASSESSMENT — ANXIETY QUESTIONNAIRES
2. NOT BEING ABLE TO STOP OR CONTROL WORRYING: SEVERAL DAYS
6. BECOMING EASILY ANNOYED OR IRRITABLE: NOT AT ALL
IF YOU CHECKED OFF ANY PROBLEMS ON THIS QUESTIONNAIRE, HOW DIFFICULT HAVE THESE PROBLEMS MADE IT FOR YOU TO DO YOUR WORK, TAKE CARE OF THINGS AT HOME, OR GET ALONG WITH OTHER PEOPLE: NOT DIFFICULT AT ALL
7. FEELING AFRAID AS IF SOMETHING AWFUL MIGHT HAPPEN: NOT AT ALL
3. WORRYING TOO MUCH ABOUT DIFFERENT THINGS: NOT AT ALL
GAD7 TOTAL SCORE: 1
1. FEELING NERVOUS, ANXIOUS, OR ON EDGE: NOT AT ALL
5. BEING SO RESTLESS THAT IT IS HARD TO SIT STILL: NOT AT ALL

## 2019-08-14 ASSESSMENT — MIFFLIN-ST. JEOR: SCORE: 1643.45

## 2019-08-14 ASSESSMENT — PATIENT HEALTH QUESTIONNAIRE - PHQ9
SUM OF ALL RESPONSES TO PHQ QUESTIONS 1-9: 2
5. POOR APPETITE OR OVEREATING: NOT AT ALL

## 2019-08-14 NOTE — LETTER
My Depression Action Plan  Name: Castillo Aleln   Date of Birth 1965  Date: 8/13/2019    My doctor: Ingrid Nuñez   My clinic: [facility]  [address] [address] [phone]          GREEN    ZONE   Good Control    What it looks like:     Things are going generally well. You have normal up s and down s. You may even feel depressed from time to time, but bad moods usually last less than a day.   What you need to do:  1. Continue to care for yourself (see self care plan)  2. Check your depression survival kit and update it as needed  3. Follow your physician s recommendations including any medication.  4. Do not stop taking medication unless you consult with your physician first.           YELLOW         ZONE Getting Worse    What it looks like:     Depression is starting to interfere with your life.     It may be hard to get out of bed; you may be starting to isolate yourself from others.    Symptoms of depression are starting to last most all day and this has happened for several days.     You may have suicidal thoughts but they are not constant.   What you need to do:     1. Call your care team, your response to treatment will improve if you keep your care team informed of your progress. Yellow periods are signs an adjustment may need to be made.     2. Continue your self-care, even if you have to fake it!    3. Talk to someone in your support network    4. Open up your depression survival kit           RED    ZONE Medical Alert - Get Help    What it looks like:     Depression is seriously interfering with your life.     You may experience these or other symptoms: You can t get out of bed most days, can t work or engage in other necessary activities, you have trouble taking care of basic hygiene, or basic responsibilities, thoughts of suicide or death that will not go away, self-injurious behavior.     What you need to do:  1. Call your care  team and request a same-day appointment. If they are not available (weekends or after hours) call your local crisis line, emergency room or 911.            Depression Self Care Plan / Survival Kit    Self-Care for Depression  Here s the deal. Your body and mind are really not as separate as most people think.  What you do and think affects how you feel and how you feel influences what you do and think. This means if you do things that people who feel good do, it will help you feel better.  Sometimes this is all it takes.  There is also a place for medication and therapy depending on how severe your depression is, so be sure to consult with your medical provider and/ or Behavioral Health Consultant if your symptoms are worsening or not improving.     In order to better manage my stress, I will:    Exercise  Get some form of exercise, every day. This will help reduce pain and release endorphins, the  feel good  chemicals in your brain. This is almost as good as taking antidepressants!  This is not the same as joining a gym and then never going! (they count on that by the way ) It can be as simple as just going for a walk or doing some gardening, anything that will get you moving.      Hygiene   Maintain good hygiene (Get out of bed in the morning, Make your bed, Brush your teeth, Take a shower, and Get dressed like you were going to work, even if you are unemployed).  If your clothes don't fit try to get ones that do.    Diet  I will strive to eat foods that are good for me, drink plenty of water, and avoid excessive sugar, caffeine, alcohol, and other mood-altering substances.  Some foods that are helpful in depression are: complex carbohydrates, B vitamins, flaxseed, fish or fish oil, fresh fruits and vegetables.    Psychotherapy  I agree to participate in Individual Therapy (if recommended).    Medication  If prescribed medications, I agree to take them.  Missing doses can result in serious side effects.  I  understand that drinking alcohol, or other illicit drug use, may cause potential side effects.  I will not stop my medication abruptly without first discussing it with my provider.    Staying Connected With Others  I will stay in touch with my friends, family members, and my primary care provider/team.    Use your imagination  Be creative.  We all have a creative side; it doesn t matter if it s oil painting, sand castles, or mud pies! This will also kick up the endorphins.    Witness Beauty  (AKA stop and smell the roses) Take a look outside, even in mid-winter. Notice colors, textures. Watch the squirrels and birds.     Service to others  Be of service to others.  There is always someone else in need.  By helping others we can  get out of ourselves  and remember the really important things.  This also provides opportunities for practicing all the other parts of the program.    Humor  Laugh and be silly!  Adjust your TV habits for less news and crime-drama and more comedy.    Control your stress  Try breathing deep, massage therapy, biofeedback, and meditation. Find time to relax each day.     My support system    Clinic Contact:  Phone number:    Contact 1:  Phone number:    Contact 2:  Phone number:    Hinduism/:  Phone number:    Therapist:  Phone number:    Local crisis center:    Phone number:    Other community support:  Phone number:

## 2019-08-15 ASSESSMENT — ANXIETY QUESTIONNAIRES: GAD7 TOTAL SCORE: 1

## 2019-11-03 ENCOUNTER — HEALTH MAINTENANCE LETTER (OUTPATIENT)
Age: 54
End: 2019-11-03

## 2020-01-29 ENCOUNTER — OFFICE VISIT (OUTPATIENT)
Dept: PODIATRY | Facility: CLINIC | Age: 55
End: 2020-01-29
Payer: COMMERCIAL

## 2020-01-29 VITALS
WEIGHT: 185 LBS | DIASTOLIC BLOOD PRESSURE: 76 MMHG | HEIGHT: 68 IN | SYSTOLIC BLOOD PRESSURE: 122 MMHG | BODY MASS INDEX: 28.04 KG/M2

## 2020-01-29 DIAGNOSIS — M72.2 PLANTAR FASCIITIS, LEFT: ICD-10-CM

## 2020-01-29 DIAGNOSIS — Q66.70 PES CAVUS: ICD-10-CM

## 2020-01-29 DIAGNOSIS — M25.571 ACUTE RIGHT ANKLE PAIN: ICD-10-CM

## 2020-01-29 DIAGNOSIS — M76.71 PERONEAL TENDONITIS, RIGHT: ICD-10-CM

## 2020-01-29 DIAGNOSIS — M79.672 LEFT FOOT PAIN: Primary | ICD-10-CM

## 2020-01-29 PROCEDURE — 99203 OFFICE O/P NEW LOW 30 MIN: CPT | Performed by: PODIATRIST

## 2020-01-29 ASSESSMENT — MIFFLIN-ST. JEOR: SCORE: 1653.65

## 2020-01-29 NOTE — LETTER
1/29/2020         RE: Castillo Allen  [id number] [facility] [id number]        Dear Colleague,    Thank you for referring your patient, Castillo Allen, to the [facility]. Please see a copy of my visit note below.    PATIENT HISTORY: Castillo Allen is a 54 year old male who presents to clinic for pain to the left heel. Has been going on for about 2-3 months. States that it is mostly in the morning. Thinks it is plantar fasciitis. Pain is 1/10 and can be 5/10 at its worst. Has tried new shoes. Has gotten better but has not gone away. Wondering if anything else can be done. Also notes minimal intermittent pain of the right ankle. Mostly with stairs.     Review of Systems:  Patient denies fever, chills, rash, wound, stiffness, numbness, weakness, heart burn, blood in stool, chest pain with activity, calf pain when walking, shortness of breath with activity, chronic cough, easy bleeding/bruising, swelling of ankles, excessive thirst, fatigue, depression, anxiety.  Patient admits to limping at times.     PAST MEDICAL HISTORY:   Past Medical History:   Diagnosis Date     Allergic rhinitis, cause unspecified         PAST SURGICAL HISTORY:   Past Surgical History:   Procedure Laterality Date     VASECTOMY          MEDICATIONS:   Current Outpatient Medications:      IBUPROFEN PO, Take by mouth as needed , Disp: , Rfl:      ALLERGIES:    Allergies   Allergen Reactions     Seasonal Allergies         SOCIAL HISTORY:   Social History     Socioeconomic History     Marital status:      Spouse name: Not on file     Number of children: Not on file     Years of education: Not on file     Highest education level: Not on file   Occupational History     Not on file   Social Needs     Financial resource strain: Not on file     Food insecurity:     Worry: Not on file     Inability: Not on file     Transportation needs:     Medical: Not on file     Non-medical: Not on file   Tobacco Use     Smoking  "status: Never Smoker     Smokeless tobacco: Never Used   Substance and Sexual Activity     Alcohol use: Yes     Alcohol/week: 1.0 standard drinks     Types: 1 Standard drinks or equivalent per week     Drug use: No     Sexual activity: Yes     Partners: Female     Birth control/protection: Male Surgical     Comment: Vasectomy   Lifestyle     Physical activity:     Days per week: Not on file     Minutes per session: Not on file     Stress: Not on file   Relationships     Social connections:     Talks on phone: Not on file     Gets together: Not on file     Attends Latter-day service: Not on file     Active member of club or organization: Not on file     Attends meetings of clubs or organizations: Not on file     Relationship status: Not on file     Intimate partner violence:     Fear of current or ex partner: Not on file     Emotionally abused: Not on file     Physically abused: Not on file     Forced sexual activity: Not on file   Other Topics Concern     Parent/sibling w/ CABG, MI or angioplasty before 65F 55M? No   Social History Narrative     Not on file        FAMILY HISTORY:   Family History   Problem Relation Age of Onset     Cancer - colorectal Father          at about age 74--dx at around age 50     Diabetes Mother      Thyroid Disease Mother      Diabetes Maternal Grandmother      Diabetes Brother         44     Thyroid Disease Brother      Thyroid Disease Sister      Thyroid Disease Brother      Cerebrovascular Disease Maternal Grandfather      No Known Problems Sister         EXAM:Vitals: /76   Ht 1.727 m (5' 8\")   Wt 83.9 kg (185 lb)   BMI 28.13 kg/m     BMI= Body mass index is 28.13 kg/m .    General appearance: Patient is alert and fully cooperative with history & exam.  No sign of distress is noted during the visit.     Psychiatric: Affect is pleasant & appropriate.  Patient appears motivated to improve health.     Respiratory: Breathing is regular & unlabored while sitting.     HEENT: " Hearing is intact to spoken word.  Speech is clear.  No gross evidence of visual impairment that would impact ambulation.     Dermatologic: Skin is intact to both lower extremities without significant lesions, rash or abrasion.  No paronychia or evidence of soft tissue infection is noted.     Vascular: DP & PT pulses are intact & regular bilaterally.  No significant edema or varicosities noted.  CFT and skin temperature is normal to both lower extremities.     Neurologic: Lower extremity sensation is intact to light touch.  No evidence of weakness or contracture in the lower extremities.  No evidence of neuropathy.     Musculoskeletal: Patient is ambulatory without assistive device or brace. Increase arch height. Pain on palpation of the left heel. Minimal pain on palpation of the right peroneal.      ASSESSMENT:    Left foot pain  Plantar fasciitis, left  Pes cavus  Acute right ankle pain  Peroneal tendonitis, right     PLAN:  Reviewed patient's chart in [facility]. The potential causes and nature of plantar fasciitis were discussed with the patient.  We reviewed the natural history/prognosis of the condition and risks if left untreated.  These include chronic pain, other sites of pain due to gait changes, and potential plantar fascial rupture.      We discussed possible causes of the condition as it relates to the patients specific situation.      Conservative treatment options were reviewed:  appropriate shoes, avoidance of barefoot walking, inserts/orthoses, stretching, ice, massage, immobilization and NSAIDs.     We also reviewed the options of injection therapy and surgery.  However, it was made clear that surgery is only considered when conservative therapy fails.  The risks and benefits of injection therapy, and surgery were discussed.     After thorough discussion and answering all questions, the patient elected to try stretching, not going barefoot or in socks, Inserts in shoes and topical pain cream. If pain  continues, can try injection, boot, PT, or MRI of the ankle.     Reviewed and discussed causes of tendonitis.  We discussed treatments such as immobiliation, icing, stretching, heel lifts, orthotics, physical therapy, MRI.     Recommend inserts and stretches and topical pain cream at this time. If pain continues recommend MRI of ankle.        Mary Mcginnis DPM, Podiatry/Foot and Ankle Surgery    Weight management plan: Patient was referred to their PCP to discuss a diet and exercise plan.      Again, thank you for allowing me to participate in the care of your patient.        Sincerely,        Mary Mcginnis DPM, Podiatry/Foot and Ankle Surgery

## 2020-01-29 NOTE — PROGRESS NOTES
PATIENT HISTORY: Castillo Allen is a 54 year old male who presents to clinic for pain to the left heel. Has been going on for about 2-3 months. States that it is mostly in the morning. Thinks it is plantar fasciitis. Pain is 1/10 and can be 5/10 at its worst. Has tried new shoes. Has gotten better but has not gone away. Wondering if anything else can be done. Also notes minimal intermittent pain of the right ankle. Mostly with stairs.     Review of Systems:  Patient denies fever, chills, rash, wound, stiffness, numbness, weakness, heart burn, blood in stool, chest pain with activity, calf pain when walking, shortness of breath with activity, chronic cough, easy bleeding/bruising, swelling of ankles, excessive thirst, fatigue, depression, anxiety.  Patient admits to limping at times.     PAST MEDICAL HISTORY:   Past Medical History:   Diagnosis Date     Allergic rhinitis, cause unspecified         PAST SURGICAL HISTORY:   Past Surgical History:   Procedure Laterality Date     VASECTOMY          MEDICATIONS:   Current Outpatient Medications:      IBUPROFEN PO, Take by mouth as needed , Disp: , Rfl:      ALLERGIES:    Allergies   Allergen Reactions     Seasonal Allergies         SOCIAL HISTORY:   Social History     Socioeconomic History     Marital status:      Spouse name: Not on file     Number of children: Not on file     Years of education: Not on file     Highest education level: Not on file   Occupational History     Not on file   Social Needs     Financial resource strain: Not on file     Food insecurity:     Worry: Not on file     Inability: Not on file     Transportation needs:     Medical: Not on file     Non-medical: Not on file   Tobacco Use     Smoking status: Never Smoker     Smokeless tobacco: Never Used   Substance and Sexual Activity     Alcohol use: Yes     Alcohol/week: 1.0 standard drinks     Types: 1 Standard drinks or equivalent per week     Drug use: No     Sexual activity: Yes      "Partners: Female     Birth control/protection: Male Surgical     Comment: Vasectomy   Lifestyle     Physical activity:     Days per week: Not on file     Minutes per session: Not on file     Stress: Not on file   Relationships     Social connections:     Talks on phone: Not on file     Gets together: Not on file     Attends Anabaptism service: Not on file     Active member of club or organization: Not on file     Attends meetings of clubs or organizations: Not on file     Relationship status: Not on file     Intimate partner violence:     Fear of current or ex partner: Not on file     Emotionally abused: Not on file     Physically abused: Not on file     Forced sexual activity: Not on file   Other Topics Concern     Parent/sibling w/ CABG, MI or angioplasty before 65F 55M? No   Social History Narrative     Not on file        FAMILY HISTORY:   Family History   Problem Relation Age of Onset     Cancer - colorectal Father          at about age 74--dx at around age 50     Diabetes Mother      Thyroid Disease Mother      Diabetes Maternal Grandmother      Diabetes Brother         44     Thyroid Disease Brother      Thyroid Disease Sister      Thyroid Disease Brother      Cerebrovascular Disease Maternal Grandfather      No Known Problems Sister         EXAM:Vitals: /76   Ht 1.727 m (5' 8\")   Wt 83.9 kg (185 lb)   BMI 28.13 kg/m    BMI= Body mass index is 28.13 kg/m .    General appearance: Patient is alert and fully cooperative with history & exam.  No sign of distress is noted during the visit.     Psychiatric: Affect is pleasant & appropriate.  Patient appears motivated to improve health.     Respiratory: Breathing is regular & unlabored while sitting.     HEENT: Hearing is intact to spoken word.  Speech is clear.  No gross evidence of visual impairment that would impact ambulation.     Dermatologic: Skin is intact to both lower extremities without significant lesions, rash or abrasion.  No paronychia " or evidence of soft tissue infection is noted.     Vascular: DP & PT pulses are intact & regular bilaterally.  No significant edema or varicosities noted.  CFT and skin temperature is normal to both lower extremities.     Neurologic: Lower extremity sensation is intact to light touch.  No evidence of weakness or contracture in the lower extremities.  No evidence of neuropathy.     Musculoskeletal: Patient is ambulatory without assistive device or brace. Increase arch height. Pain on palpation of the left heel. Minimal pain on palpation of the right peroneal.      ASSESSMENT:    Left foot pain  Plantar fasciitis, left  Pes cavus  Acute right ankle pain  Peroneal tendonitis, right     PLAN:  Reviewed patient's chart in [facility]. The potential causes and nature of plantar fasciitis were discussed with the patient.  We reviewed the natural history/prognosis of the condition and risks if left untreated.  These include chronic pain, other sites of pain due to gait changes, and potential plantar fascial rupture.      We discussed possible causes of the condition as it relates to the patients specific situation.      Conservative treatment options were reviewed:  appropriate shoes, avoidance of barefoot walking, inserts/orthoses, stretching, ice, massage, immobilization and NSAIDs.     We also reviewed the options of injection therapy and surgery.  However, it was made clear that surgery is only considered when conservative therapy fails.  The risks and benefits of injection therapy, and surgery were discussed.     After thorough discussion and answering all questions, the patient elected to try stretching, not going barefoot or in socks, Inserts in shoes and topical pain cream. If pain continues, can try injection, boot, PT, or MRI of the ankle.     Reviewed and discussed causes of tendonitis.  We discussed treatments such as immobiliation, icing, stretching, heel lifts, orthotics, physical therapy, MRI.     Recommend inserts  and stretches and topical pain cream at this time. If pain continues recommend MRI of ankle.        Mary Mcginnis DPM, Podiatry/Foot and Ankle Surgery    Weight management plan: Patient was referred to their PCP to discuss a diet and exercise plan.

## 2020-01-29 NOTE — PATIENT INSTRUCTIONS
Thank you for choosing [facility] Podiatry / Foot & Ankle Surgery!    DR. ELIZALDE'S CLINIC SCHEDULE  MONDAY AM - [place] TUESDAY - [place]   [address] [address] [address] [address] [place] [address]   [phone] / [phone] / [phone]       WEDNESDAY - [place] FRIDAY AM - [facility]   [address]   [address] [address] [address] [address] [address] [address]   [phone] / [phone]       FRIDAY PM - [place] SCHEDULE SURGERY: [phone]   [address] BILLING QUESTIONS: [phone]   [place] [address] AFTER HOURS: [phone]   [phone] / [phone] APPOINTMENTS: [phone]     Consumer Price Line (CPL) [phone]     FOLLOW UP:  As needed        PLANTAR FASCIITIS  Plantar fasciitis is often referred to as heel spurs or heel pain. Plantar fasciitis is a very common problem that affects people of all foot shapes, age, weight and activity level. Pain may be in the arch or on the weight-bearing surface of the heel. The pain may come on without injury or identifiable cause. Pain is generally present when first getting out of bed in the morning or up from a seated break.     CAUSES  The plantar fascia is a dense fibrous band of tissue that stretches across the bottom surface of the foot. The fascia helps support the foot muscles and arch. Plantar fasciitis is thought to be caused by mechanical strain or overload. Frequent walking without shoes or wearing unsupportive shoes is thought to cause structural overload and ultimately inflammation of the plantar fascia. Some people have heel spurs that can be seen on x-ray. The heel spur is actually a minor component of plantar fascitis and is largely ignored.       SELF TREATMENT   The easiest solution is to stop walking around your home without shoes. Plantar fasciitis is largely a shoe problem. Shoes are either not being worn often enough or your current  shoes are inadequate for your weight, foot structure or activity level. The majority of shoes on the market today are not sufficient to resist development of plantar fasciitis or to promote healing. Assume that your current shoes are inadequate and will need to be replaced. Even high quality shoes wear out with 6 months to one year of frequent use. Weight loss is another option. Losing ten pounds in the next two months may be enough to resolve the problem. Ice applied to the area of pain two to three times per day for ten minutes each session can be very helpful. Warm foot soaks in epsom salts can also relieve pain. This should continue until the problem resolves. Achilles tendon stretching is essential. Stretch multiple times daily to promote healing and to prevent recurrence in the future. Over all stretching of the body is helpful as well such as the calves, thighs and lower back. Normally when one area of the body is tight, other areas are too. Gentle Yoga can be good for this.     Over the counter topical anti inflammatories can be helpful such as biofreeze, bengay, salon pas, ect...  Oral ibuprofen or aleve is recommended as well to try to calm down inflammation.     Night splints can be helpful to gradually stretch the foot at night as a lot of pain is when you get up in the morning. Taking a towel or thera band and stretching the foot back multiple times before you get ou of bed can be beneficial as well.     MEDICAL TREATMENT  Medical treatments often include custom arch supports, cortisone injections, physical therapy, splints to be worn in bed, prescription medications and surgery. The home treatments listed above will be necessary regardless of these advanced medical treatments. Surgery is rarely needed but is very helpful in selected cases.     PROGNOSIS  Plantar fasciitis can last from one day to a lifetime. Some people get intermittent fascitis that is very short-lived. Others suffer daily for years.  Excessive body weight, frequent bare foot walking, long hours on the feet, inadequate shoes, predisposing foot structures and excessive activity such as running are all potential issues that lead to chronic and/or recurring plantar fascitis. Having plantar fasciitis means that you are forever prone to this problem and will require modification of some of the above factors. Most people seek treatment within one to four months. Healing usually requires a similar one to four month time frame. Healing time is relative to the amount of effort spent treating the problem.   Plantar fasciitis is highly recurrent. Risk factors often continue, including return to bare foot walking, inadequate shoes, excessive body weight, excessive activities, etc. Your life style and foot structure may predispose you to recurrent plantar fasciitis. A daily prevention regimen can be very helpful. Ongoing use of shoe inserts, careful attention to appropriate shoes, daily Achilles stretching, etc. may prevent recurrence. Prompt attention at the earliest warning signs of heel pain can resolve the problem in as short as a few days.     EXERCISES  Stair Exercise: Step on the stairs with the ball of your foot and hold your position for at least 15 seconds, then slowly step down with the heels of your foot. You can do this daily and as often as you want.   Picking the Towel: Sit comfortably and then pick the towel up with your toes. You can use any object other than a towel as long as the material can be soft and you can pick it up with your toes.  Rolling the Bottle: Use a small ball or frozen water bottle and then roll it around with your foot.   Flex the Toes: Sit comfortably and then flex your toes by pointing it towards the floor or towards your body. This will relax and flex your foot and exercise your plantar fascia, the calf, and the Achilles tendon. The inability of the foot to stretch often causes the bunching up of the plantar fascia area  leading to the pain.  Calf/Achilles Stretching: Lay on you back and raise one foot, then point your toes towards the floor. See photo below:               Hold each stretch for 10 seconds. Stretch 10 times per set, three sets per day. Morning, afternoon and evening. If your heel pain is very severe in the morning, consider doing the first set of stretches before you get out of bed.      OVER THE COUNTER INSERT RECOMMENDATIONS  SuperFeet   Sofsole Fit Spenco   Power Step   Walk-Fit Arch Cradles     Most of these can be found at your local [employer], [employer], or online.  **A good high quality over the counter insert should cost around $40-$50      [employer] LOCATIONS  [place]  [address] [place] [address]  [phone]   [address]  [phone] [place]  [address] [facility]  [phone]   [place]  [address]  [phone]   [place]  [address] [facility]  [phone] [place] [address]  [phone]   [place]  [address]  [phone]   [place]  [address]  [phone] [place]  [address]  [phone]             ACHILLES TENDON LENGTHENING  The Achilles tendon and calf muscles play a critical role in normal foot and ankle function. Tightness of the muscle and tendon complex prohibits the normal amount of ankle flexibility. Inadequate ankle dorsiflexion, also known as equinus, results in compensatory stress throughout the leg and foot.     Unnatural stress on the foot results in arch collapse, foot pronation, plantar fasciitis, pain in the ball of the foot, hammer toes, bunions, arthritis, foot wounds, etc. The knee, hip, and leg muscles may also be affected by ankle stiffness. People with diabetes have additional problems with tightness of the Achilles tendon. The combination of foot numbness and a tight Achilles may lead to pressure sores in the ball of the foot. Bone breakdown through the midfoot may also  occur as a complication of equinus.     Initial treatment might involve stretching exercises. Stretching will keep the muscles loose but you will not likely accomplish making the tendon longer. Daily Achilles stretching is important   nonetheless. Surgical lengthening of the tendon is often required. .   Surgery can be performed in several ways. The tendon can be lengthened at the ankle level (Achilles lengthening) or in the mid calf (Gastroc lengthening). Achilles and gastrocnemius lengthening can be performed as an isolated procedure or in combination with surgical procedures for other conditions.   Lengthening procedures are most commonly performed for treatment of flatfoot deformity, contracture associated with diabetes related forefoot and midfoot ulcers, contracture after injury or stroke and to treat Achilles tendonitis.   The goal of surgery is to make the tendon longer yet still preserve function of the calf muscles. Most surgical patients do not have noticeable weakness once they recover from the surgery.     The main potential hazard of surgery involves tendon rupture during the healing process. Rupture is not common but could potentially occur. The tendon can also become excessively lengthened, especially from the stress of premature walking before the tendon is healed. Tendon adhesion, skin adhesion and nerve irritation or numbness can also occur.    CALF AND ACHILLES TENDON STRECHES   Stretch gently, do not bounce.   Do each set of stretches three times a day while you are having symptoms.   Do each set of stretches once a day after symptoms are relieved.     1. Towel Stretch     Sit on hard surface with one leg straight out in front of you.     Loop a towel around the ball of the foot and pull the towel to your body.     Be sure to keep your leg straight.     You should feel tension in the calf muscle of the straight leg.     Repeat 3 times on each side for at least 15 seconds each.     2. Standing  Calf Stretch     Stand about a foot from a wall and extend one leg behind you.     Keep both feet flat on the floor, toes pointed straight ahead, and the rear knee  straight.     Lean into the wall until you feel tension in the rear leg.     Hold for at least 15 seconds.     Repeat 2 times on each side.     3. Standing Achilles Tendon Stretch     Get into position of Standing Calf Stretch.     Lower the hips downward as you slightly bend the knee of the rear leg.     Keep both feet flat on the floor and toes straight ahead.     Hold for at least 15 seconds.     Repeat 2 times on each side.         TENDONITIS   Tendons are the strong fibrous portions ofmuscles that attach to bones and allow the muscle to move a joint when it contracts. Tendons are very strong because they have a lot of force exerted on them. Sometimes tendons can become painful because they have suffered an acute injury, in which too much force was exerted at one time, or an overuse injury, in which a normal force was exerted too frequently or over a prolonged period of time. As a result, there is damage to the tendon and its surrounding soft tissue structures and they become inflammed. Because tendons do not have a great blood supply, they do not heal rapidly and the inflammation can become chronic.   Conservative treatment for tendinitis involves rest and anti-inflammatory measures. Ice is applied 15 minutes 2-3 times daily. Anti-inflammatory medications called NSAIDs (ibuprofen, example) can be taken provided they are used with caution, as they can lead to internal bleeding and increase the risk ofstroke and heart attack. Sometimes topical nitroglycerin is prescribed to help with pain. Often your doctor will use a special shoe or removable walking cast to immobilize the tendon, allowing it to heal without further damage from use. These devices are very useful in helping tendons heal, but they may slow you down or make you feel like your hip, knee,  or back are out ofalignment. This is temporary and should go away once you are out ofthe immobilization. You should not use a walking cast when showering or driving. Another option is Platelet Rich Plasma injections. (Normally done with a Sports and Orthorapedic doctor.   If conservative measures fail, your physician may need to surgically repair the tendon by removing any chronic inflammatory tissue and sewing it back together. Sometimes it is sewn to an adjacent tendon with similar function for support and sometimes it is lengthened. . Sometimes the bones around the tendon need to be realigned or reshaped to better support the tendon or prevent further damage. Your foot and ankle surgeon will discuss the specifics of your surgery with you, should you need it.    Towel stretch: Sit on a hard surface with your injured leg stretched out in front of you. Loop a towel around your toes and the ball of your foot and pull the towel toward your body keeping your leg straight. Hold this position for 15 to 30 seconds and then relax. Repeat 3 times. Then push the towel away with the ball of your foot. Repeat 3 times.  When you don't feel much of a stretch using the towel, you can start the standing calf stretch and the following exercises.  Standing calf stretch: Stand facing a wall with your hands on the wall at about eye level. Keep your injured leg back with your heel on the floor. Keep the other leg forward with the knee bent. Turn your back foot slightly inward (as if you were pigeon-toed). Slowly lean into the wall until you feel a stretch in the back of your calf. Hold the stretch for 15 to 30 seconds. Return to the starting position. Repeat 3 times. Do this exercise several times each day.   Standing soleus stretch: Stand facing a wall with your hands on the wall at about chest height. Keep your injured leg back with your heel on the floor. Keep the other leg forward with the knee bent. Turn your back foot slightly  inward (as if you were pigeon-toed). Bend your back knee slightly and gently lean into the wall until you feel a stretch in the lower calf of your injured leg. Hold the stretch for 15 to 30 seconds. Return to the starting position. Repeat 3 times.   Achilles stretch: Stand with the ball of one foot on a stair. Reach for the step below with your heel until you feel a stretch in the arch of your foot. Hold this position for 15 to 30 seconds and then relax. Repeat 3 times.   Heel raise: Balance yourself while standing behind a chair or counter. Using the chair or counter as a support to help you, raise your body up onto your toes and hold for 5 seconds. Then slowly lower yourself down without holding onto the support. (It's OK to keep holding onto the support if you need to.) When this exercise becomes less painful, try lowering yourself down on the injured leg only. Repeat 15 times. Do 2 sets of 15. Rest 30 seconds between sets.   Step-up: Stand with the foot of your injured leg on a support 3 to 5 inches high (like a small step or block of wood). Keep your other foot flat on the floor. Shift your weight onto the injured leg on the support. Straighten your injured leg as the other leg comes off the floor. Return to the starting position by bending your injured leg and slowly lowering your uninjured leg back to the floor. Do 2 sets of 15.   Resisted ankle eversion: Sit with both legs stretched out in front of you, with your feet about a shoulder's width apart. Tie a loop in one end of elastic tubing. Put the foot of your injured leg through the loop so that the tubing goes around the arch of that foot and wraps around the outside of the other foot. Hold onto the other end of the tubing with your hand to provide tension. Turn the foot of your injured leg up and out. Make sure you keep your other foot still so that it will allow the tubing to stretch as you move the foot of your injured leg. Return to the starting  position. Do 2 sets of 15.   Balance and reach exercises: Stand next to a chair with your injured leg farther from the chair. The chair will provide support if you need it. Stand on the foot of your injured leg and bend your knee slightly. Try to raise the arch of this foot while keeping your big toe on the floor. Keep your foot in this position. With the hand that is farther away from the chair, reach forward in front of you by bending at the waist. Avoid bending your knee any more as you do this. Repeat this 10 times. To make the exercise more challenging, reach farther in front of you. Do 2 sets of 10.  the same position as above. While keeping your arch height, reach the hand that is farther away from the chair across your body toward the chair. The farther you reach, the more challenging the exercise. Do 2 sets of 10.     Resisted ankle eversion: Sit with both legs stretched out in front of you, with your feet about a shoulder's width apart. Tie a loop in one end of elastic tubing. Put the foot of your injured leg through the loop so that the tubing goes around the arch of that foot and wraps around the outside of the other foot. Hold onto the other end of the tubing with your hand to provide tension. Turn the foot of your injured leg up and out. Make sure you keep your other foot still so that it will allow the tubing to stretch as you move the foot of your injured leg. Return to the starting position. Do 2 sets of 15.   If you have access to a wobble board, do the following exercises:  Wobble board exercises:   Stand on a wobble board with your feet shoulder width apart. Rock the board forwards and backwards 30 times, then side to side 30 times. Hold on to a chair if you need support.   Rotate the wobble board around so that the edge of the board is in contact with the floor at all times. Do this 30 times in a clockwise and then a counterclockwise direction.   Balance on the wobble board for as long  as you can without letting the edges touch the floor. Try to do this for 2 minutes without touching the floor.   Rotate the wobble board in clockwise and counterclockwise circles, but do not let the edge of the board touch the floor.   When you have mastered exercises A through D, try repeating them while standing on just your injured leg.   After you are able to do these exercises on one leg, try to do them with your eyes closed. Make sure you have something nearby to support you in case you lose your balance.        BODY WEIGHT AND YOUR FEET  The following information is included in the after visit summary for all patients. Body weight can be a sensitive issue to discuss in clinic, but we think the following information is very important. Although we focus on the feet and ankles, we do support the overall health of our patients.     Many things can cause foot and ankle problems. Foot structure, activity level, foot mechanics and injuries are common causes of pain. One very important issue that often goes unmentioned, is body weight. Extra weight can cause increased stress on muscles, ligaments, bones and tendons. Sometimes just a few extra pounds is all it takes to put one over her/his threshold. Without reducing that stress, it can be difficult to alleviate pain. As Foot & Ankle specialists, our job is addressing the lower extremity problem and possible causes. Regarding extra body weight, we encourage patients to discuss diet and weight management plans with their primary care doctors. It is this team approach that gives you the best opportunity for pain relief and getting you back on your feet.      [facility] has a Comprehensive Weight Management Program. This program includes counseling, education, non-surgical and surgical approaches to weight loss. If you are interested in learning more either talk to you primary care provider or call [phone].

## 2020-02-07 ENCOUNTER — ALLIED HEALTH/NURSE VISIT (OUTPATIENT)
Dept: FAMILY MEDICINE | Facility: CLINIC | Age: 55
End: 2020-02-07
Payer: COMMERCIAL

## 2020-02-07 VITALS — SYSTOLIC BLOOD PRESSURE: 136 MMHG | DIASTOLIC BLOOD PRESSURE: 88 MMHG

## 2020-02-07 DIAGNOSIS — Z01.30 BP CHECK: Primary | ICD-10-CM

## 2020-02-07 PROCEDURE — 99207 ZZC NO CHARGE NURSE ONLY: CPT | Performed by: PHYSICIAN ASSISTANT

## 2020-02-07 NOTE — PROGRESS NOTES
Castillo Allen was evaluated at [facility] on February 7, 2020 at which time his blood pressure was:    BP Readings from Last 3 Encounters:   02/07/20 136/88   01/29/20 122/76   08/14/19 126/74     Pulse Readings from Last 3 Encounters:   08/14/19 86   12/23/18 86   08/15/18 94       Reviewed lifestyle modifications for blood pressure control and reduction: including making healthy food choices, managing weight, getting regular exercise, smoking cessation, reducing alcohol consumption, monitoring blood pressure regularly.     Symptoms: None    BP Goal:< 140/90 mmHg    BP Assessment:  BP at goal    Potential Reasons for BP too high: NA - Not applicable    BP Follow-Up Plan: Recheck BP in 6 months at pharmacy    Recommendation to Provider: none    Note completed by: Juan Van    Thank You   Senthil Sykes [facility]

## 2020-09-02 ENCOUNTER — TRANSFERRED RECORDS (OUTPATIENT)
Dept: HEALTH INFORMATION MANAGEMENT | Facility: CLINIC | Age: 55
End: 2020-09-02

## 2020-11-12 ENCOUNTER — VIRTUAL VISIT (OUTPATIENT)
Dept: FAMILY MEDICINE | Facility: CLINIC | Age: 55
End: 2020-11-12
Payer: COMMERCIAL

## 2020-11-12 DIAGNOSIS — R03.0 ELEVATED BLOOD PRESSURE READING WITHOUT DIAGNOSIS OF HYPERTENSION: ICD-10-CM

## 2020-11-12 DIAGNOSIS — F43.23 ADJUSTMENT DISORDER WITH MIXED ANXIETY AND DEPRESSED MOOD: Primary | ICD-10-CM

## 2020-11-12 PROCEDURE — 99214 OFFICE O/P EST MOD 30 MIN: CPT | Mod: 95 | Performed by: PHYSICIAN ASSISTANT

## 2020-11-12 RX ORDER — ESCITALOPRAM OXALATE 20 MG/1
10 TABLET ORAL DAILY
Qty: 90 TABLET | Refills: 1 | Status: SHIPPED | OUTPATIENT
Start: 2020-11-12 | End: 2021-11-19

## 2020-11-12 ASSESSMENT — ANXIETY QUESTIONNAIRES
3. WORRYING TOO MUCH ABOUT DIFFERENT THINGS: SEVERAL DAYS
GAD7 TOTAL SCORE: 9
2. NOT BEING ABLE TO STOP OR CONTROL WORRYING: MORE THAN HALF THE DAYS
IF YOU CHECKED OFF ANY PROBLEMS ON THIS QUESTIONNAIRE, HOW DIFFICULT HAVE THESE PROBLEMS MADE IT FOR YOU TO DO YOUR WORK, TAKE CARE OF THINGS AT HOME, OR GET ALONG WITH OTHER PEOPLE: SOMEWHAT DIFFICULT
5. BEING SO RESTLESS THAT IT IS HARD TO SIT STILL: SEVERAL DAYS
7. FEELING AFRAID AS IF SOMETHING AWFUL MIGHT HAPPEN: NOT AT ALL
1. FEELING NERVOUS, ANXIOUS, OR ON EDGE: MORE THAN HALF THE DAYS
6. BECOMING EASILY ANNOYED OR IRRITABLE: MORE THAN HALF THE DAYS

## 2020-11-12 ASSESSMENT — PATIENT HEALTH QUESTIONNAIRE - PHQ9
SUM OF ALL RESPONSES TO PHQ QUESTIONS 1-9: 8
5. POOR APPETITE OR OVEREATING: SEVERAL DAYS

## 2020-11-12 NOTE — PROGRESS NOTES
"Castillo Allen is a 55 year old male who is being evaluated via a billable video visit.      The patient has been notified of following:     \"This video visit will be conducted via a call between you and your physician/provider. We have found that certain health care needs can be provided without the need for an in-person physical exam.  This service lets us provide the care you need with a video conversation.  If a prescription is necessary we can send it directly to your pharmacy.  If lab work is needed we can place an order for that and you can then stop by our lab to have the test done at a later time.    Video visits are billed at different rates depending on your insurance coverage.  Please reach out to your insurance provider with any questions.    If during the course of the call the physician/provider feels a video visit is not appropriate, you will not be charged for this service.\"    Patient has given verbal consent for Video visit? Yes  How would you like to obtain your AVS? MyChart  If you are dropped from the video visit, the video invite should be resent to: Text to cell phone: [phone]  Will anyone else be joining your video visit? No      Subjective     Castillo Allen is a 55 year old male who presents today via video visit for the following health issues:    HPI     Patient states that he has been checking BP recently, has been getting readings 150-160/90's.   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      Objective           Vitals:  No vitals were obtained today due to virtual visit.    Physical Exam     GENERAL: Healthy, alert and no distress  EYES: Eyes grossly normal to inspection.  No discharge or erythema, or obvious scleral/conjunctival abnormalities.  RESP: No audible wheeze, cough, or visible cyanosis.  No visible retractions or increased work of breathing.    SKIN: Visible skin clear. No significant rash, abnormal pigmentation or lesions.  NEURO: Cranial nerves grossly intact.  Mentation and speech appropriate for age.  PSYCH: Mentation appears normal, affect normal/bright, judgement and insight intact, normal speech and appearance well-groomed.          Assessment & Plan     Adjustment disorder with mixed anxiety and depressed mood  Elevated blood pressure reading without diagnosis of hypertension  I do agree with Ry that stress/sleep may be playing large role in his HTN readings however there is family hx component as well. We'll first add below to help with the mood aspect and he'll work at some lifestyle changes and continue to check BP at home. If it does not come down we'll lneed to add medication  - escitalopram (LEXAPRO) 20 MG tablet; Take 0.5 tablets (10 mg) by mouth daily Ok to increase to 1 tab after 2-4 weeks as needed.         BMI:   Estimated body mass index is 28.13 kg/m  as calculated from the following:    Height as of 1/29/20: 1.727 m (5' 8\").    Weight as of 1/29/20: 83.9 kg (185 lb).        Return in about 6 weeks (around 12/24/2020) for 6 weeks virtual med check.    Nicolas Escobedo PA-C  [facility]      Video-Visit Details    Type of service:  Video Visit    Video End Time:12:07 PM    Originating Location (pt. Location): Home    Distant Location (provider location):  [facility] [place]     Platform used for Video Visit: " AmWell

## 2020-11-12 NOTE — PATIENT INSTRUCTIONS
Dietary Approaches to Stop Hypertension (The DASH Diet)   What is hypertension?   Hypertension is the term for blood pressure that is consistently higher than normal. Blood pressure is the force of blood against artery walls. Blood pressure can be unhealthy if it is above 120/80. The higher your blood pressure, the greater the health risk.     High blood pressure can be controlled if you take these steps:   Maintain a healthy weight.   Be physically active.   Follow a healthy eating plan, which includes foods lower in salt and sodium.   If you drink alcoholic beverages, do so in moderation.   As noted in this list, diet affects high blood pressure. Following the DASH diet and reducing the amount of sodium in your diet will help lower your blood pressure. It will also help prevent high blood pressure.     What is the DASH diet?   Dietary Approaches to Stop Hypertension (DASH) is a diet that is low in saturated fat, cholesterol, and total fat. It emphasizes fruits, vegetables, and low-fat dairy foods. The DASH diet also includes whole-grain products, fish, poultry, and nuts. It encourages fewer servings of red meat, sweets, and sugar-containing beverages. It is rich in magnesium, potassium, and calcium, as well as protein and fiber.     How do I get started on the DASH diet?   The DASH diet requires no special foods and has no hard-to-follow recipes. Start by seeing how DASH compares with your current eating habits.  The DASH eating plan shown is based on 2,000 calories a day. Your health care provider or a dietitian can help you determine how many calories a day you need. Most adults need somewhere between 1600 and 2800 calories a day. Serving sizes will vary between 1/2 cup and 1 1/4 cups.     Check the product's nutrition label to determine serving sizes of particular products.    Food Group   Number of Servings   Examples of Serving Size    Grains and grain products   7 to 8   1 slice of bread    1 cup  ready-to-eat cold cereal    1/2 cup cooked rice, pasta, or   cereal    Vegetables   4 to 5   1 cup raw leafy vegetable    1/2 cup cooked vegetable    6 oz. vegetable juice    Fruits   4 to 5   1 medium fruit       1/4 cup dried fruit    1/2 cup fresh, frozen, or canned fruit    6 oz fruit juice    Low-fat or fat-free dairy foods   2 to 3   8 oz milk    1 cup yogurt    1 1/2 oz cheese    Lean meats, poultry, or fish   2 or fewer   3 oz cooked lean meat, skinless poultry, or fish    Nuts, seeds, and dry beans   4 to 5 per week   1/3 cup or 1 1/2 oz nuts    1 tablespoon or 1/2 oz seeds    1/2 cup cooked dry beans     Fats and oils   2 to 3   1 teaspoon soft margarine    1 tablespoon low-fat mayonnaise    2 tablespoons light salad dressing    1 teaspoon vegetable oil   Sweets   5 per week   1 tablespoon sugar              8 oz lemonade    1 tablespoon jelly or jam     1/2 oz jelly beans     Make changes gradually. Here are some suggestions that might help:     If you now eat 1 or 2 servings of vegetables a day, add a serving at lunch and another at dinner.   If you don't eat fruit now or have only juice at breakfast, add a serving to your meals or have it as a snack.   Drink milk or water with lunch or dinner instead of soda, sugar-sweetened tea, or alcohol. Choose low-fat (1%) or fat-free (skim) dairy products to reduce how much saturated fat, total fat, cholesterol, and calories you eat. If you have trouble digesting dairy products, try taking lactase enzyme pills or drops (available at drugstores and groceries) with the dairy foods. Or buy lactose-free milk or milk with lactase enzyme added to it.   Read food labels on margarines and salad dressings to choose products lowest in fat.   If you now eat large portions of meat, cut back gradually--by a half or a third at each meal. Limit meat to 6 ounces a day (2 servings). Three to four ounces is about the size of a deck of cards.   Have 2 or more vegetarian-style  (meatless) meals each week. Increase servings of vegetables, rice, pasta, and beans in all meals. Try casseroles and pasta, and stir-nair dishes, which have less meat and more vegetables, grains, and beans.   Use fruits canned in their own juice. Fresh fruits require little or no preparation. Dried fruits are a good choice to carry with you or to have ready in the car.   Try these snacks ideas: unsalted pretzels or nuts mixed with raisins, kavita crackers, low-fat and fat-free yogurt and frozen yogurt, popcorn with no salt or butter added, and raw vegetables.   Choose whole grain foods to get more nutrients, including minerals and fiber. For example, choose whole-wheat bread or whole-grain cereals.   Use fresh, frozen, or no-salt-added canned vegetables.     Remember to also reduce the salt and sodium in your diet. Try to have no more than 2000 milligrams (mg) of sodium per day, with a goal of further reducing the sodium to 1500 mg per day. Three important ways to reduce sodium are:     Use reduced-sodium or no-salt-added food products.   Use less salt when you prepare foods and do not add salt to your food at the table.   Read fool labels. Aim for foods that are less than 5 percent of the daily value of sodium.     The DASH eating plan was not designed for weight loss. But it contains many lower calorie foods, such as fruits and vegetables. You can make it lower in calories by replacing higher calorie foods with more fruits and vegetables. Some ideas to increase fruits and vegetables and decrease calories include:     Eat a medium apple instead of four shortbread cookies. You'll save 80 calories.   Eat 1/4 cup of dried apricots instead of a 2-ounce bag of pork rinds. You'll save 230 calories.   Have a hamburger that's 3 ounces instead of 6 ounces. Add a 1/2 cup serving of carrots and a 1/2 cup serving of spinach. You'll save more than 200 calories.   Instead of 5 ounces of chicken, have a stir nair with 2 ounces of  chicken and 1 and 1/2 cups of raw vegetables. Use a small amount of vegetable oil. You'll save 50 calories.   Have a 1/2 cup serving of low-fat frozen yogurt instead of a 1 and 1/2 ounce milk chocolate bar. You'll save about 110 calories.   Use low-fat or fat-free condiments, such as fat free salad dressings.   Eat smaller portions--cut back gradually.   Use food labels to compare fat content in packaged foods. Items marked low-fat or fat-free may be lower in fat without being lower in calories than their regular versions.   Limit foods with lots of added sugar, such as pies, flavored yogurts, candy bars, ice cream, sherbet, regular soft drinks, and fruit drinks.   Drink water or club soda instead of cola or other soda drinks.     Based on National Institutes of Health Guidelines. Published by [employer].   Copyright   2004 [employer] and/or one of its subsidiaries. All Rights Reserved.

## 2020-11-13 ASSESSMENT — ANXIETY QUESTIONNAIRES: GAD7 TOTAL SCORE: 9

## 2020-11-16 ENCOUNTER — HEALTH MAINTENANCE LETTER (OUTPATIENT)
Age: 55
End: 2020-11-16

## 2021-01-28 ENCOUNTER — IMMUNIZATION (OUTPATIENT)
Dept: NURSING | Facility: CLINIC | Age: 56
End: 2021-01-28
Payer: COMMERCIAL

## 2021-01-28 PROCEDURE — 91300 PR COVID VAC PFIZER DIL RECON 30 MCG/0.3 ML IM: CPT

## 2021-01-28 PROCEDURE — 0001A PR COVID VAC PFIZER DIL RECON 30 MCG/0.3 ML IM: CPT

## 2021-02-18 ENCOUNTER — IMMUNIZATION (OUTPATIENT)
Dept: NURSING | Facility: CLINIC | Age: 56
End: 2021-02-18
Attending: INTERNAL MEDICINE
Payer: COMMERCIAL

## 2021-02-18 PROCEDURE — 91300 PR COVID VAC PFIZER DIL RECON 30 MCG/0.3 ML IM: CPT

## 2021-02-18 PROCEDURE — 0002A PR COVID VAC PFIZER DIL RECON 30 MCG/0.3 ML IM: CPT

## 2021-02-19 ENCOUNTER — VIRTUAL VISIT (OUTPATIENT)
Dept: FAMILY MEDICINE | Facility: CLINIC | Age: 56
End: 2021-02-19
Payer: COMMERCIAL

## 2021-02-19 DIAGNOSIS — R03.0 ELEVATED BLOOD PRESSURE READING WITHOUT DIAGNOSIS OF HYPERTENSION: ICD-10-CM

## 2021-02-19 DIAGNOSIS — F43.23 ADJUSTMENT DISORDER WITH MIXED ANXIETY AND DEPRESSED MOOD: ICD-10-CM

## 2021-02-19 DIAGNOSIS — H93.13 TINNITUS, BILATERAL: Primary | ICD-10-CM

## 2021-02-19 PROCEDURE — 99213 OFFICE O/P EST LOW 20 MIN: CPT | Mod: 95 | Performed by: PHYSICIAN ASSISTANT

## 2021-02-19 NOTE — PROGRESS NOTES
Ry is a 55 year old who is being evaluated via a billable video visit.      How would you like to obtain your AVS? Ednahart  If the video visit is dropped, the invitation should be resent by: Text to cell phone: [phone]  Will anyone else be joining your video visit? No      Video Start Time: 1:00 PM    Assessment & Plan     Tinnitus, bilateral  Ongoing issue for at least 15+ years. Discussed tinnitus in general, but ok to see ENT for eval of any other contributing etiology. See below re HTN  - OTOLARYNGOLOGY REFERRAL    Elevated blood pressure reading without diagnosis of hypertension  Recommend BP check with pharmacy; this may be contributing to worsening above and additionally with a family hx not something we want to ignore    Adjustment disorder with mixed anxiety and depressed mood  DOing better. Continue present management for now; he may consider taper later    Return in about 1 week (around 2/26/2021) for BP Recheck at pharmacy.    Nicolas Escobedo PA-C  [facility]    Subjective   Ry is a 55 year old who presents for the following health issues     HPI       Concern - ringing in the ears  Onset: few months  Description: increasingly worse, has some mild for several years, but past several months seems to be worse. Think the right is worse than the left but is really hard to tell.  Intensity:   Progression of Symptoms:  worsening  Accompanying Signs & Symptoms: none, month ago BPPV  Previous history of similar problem: yes  Precipitating factors:        Worsened by: NA  Alleviating factors:        Improved by: nothing  Therapies tried and outcome:  none     Castillo Eatontuan is a 55 year old male who presents today for video visit to discuss increased ringing in the ears  Has had this previously, but on a mild scale  Estimates this as ongoing for at least 15-20 years  Never thought that his hearing was affected  Sounds like a continued droning going on; possibly more  "R>L  Unclear if there is a time of day when its worse; notes more at night when quiet  One month ago did have an episode of vertigo; lasted around 1-2 days  We had been monitoring BP because he has been gettign elevations at home; slightly improved to 130s/80s  Mood has improved; staying on half tab lexapro (10mg); may consider moving down    Review of Systems   Constitutional, HEENT, cardiovascular, pulmonary, gi and gu systems are negative, except as otherwise noted.      Objective    Vitals - Patient Reported  Weight (Patient Reported): 81.6 kg (180 lb)  Height (Patient Reported): 172.7 cm (5' 8\")  BMI (Based on Pt Reported Ht/Wt): 27.37      Vitals:  No vitals were obtained today due to virtual visit.    Physical Exam   GENERAL: Healthy, alert and no distress  EYES: Eyes grossly normal to inspection.  No discharge or erythema, or obvious scleral/conjunctival abnormalities.  RESP: No audible wheeze, cough, or visible cyanosis.  No visible retractions or increased work of breathing.    SKIN: Visible skin clear. No significant rash, abnormal pigmentation or lesions.  NEURO: Cranial nerves grossly intact.  Mentation and speech appropriate for age.  PSYCH: Mentation appears normal, affect normal/bright, judgement and insight intact, normal speech and appearance well-groomed.          Video-Visit Details    Type of service:  Video Visit    Video End Time:1:22 PM    Originating Location (pt. Location): Home    Distant Location (provider location):  [facility]     Platform used for Video Visit: Marie"

## 2021-02-24 ENCOUNTER — TRANSFERRED RECORDS (OUTPATIENT)
Dept: HEALTH INFORMATION MANAGEMENT | Facility: CLINIC | Age: 56
End: 2021-02-24

## 2021-09-18 ENCOUNTER — HEALTH MAINTENANCE LETTER (OUTPATIENT)
Age: 56
End: 2021-09-18

## 2021-11-19 ENCOUNTER — OFFICE VISIT (OUTPATIENT)
Dept: FAMILY MEDICINE | Facility: CLINIC | Age: 56
End: 2021-11-19
Payer: COMMERCIAL

## 2021-11-19 VITALS
RESPIRATION RATE: 18 BRPM | TEMPERATURE: 97.9 F | WEIGHT: 174.2 LBS | SYSTOLIC BLOOD PRESSURE: 118 MMHG | HEART RATE: 82 BPM | OXYGEN SATURATION: 98 % | HEIGHT: 67 IN | BODY MASS INDEX: 27.34 KG/M2 | DIASTOLIC BLOOD PRESSURE: 88 MMHG

## 2021-11-19 DIAGNOSIS — Z13.6 CARDIOVASCULAR SCREENING; LDL GOAL LESS THAN 160: ICD-10-CM

## 2021-11-19 DIAGNOSIS — Z11.4 SCREENING FOR HIV (HUMAN IMMUNODEFICIENCY VIRUS): ICD-10-CM

## 2021-11-19 DIAGNOSIS — Z00.00 ROUTINE GENERAL MEDICAL EXAMINATION AT A HEALTH CARE FACILITY: Primary | ICD-10-CM

## 2021-11-19 DIAGNOSIS — Z12.5 SCREENING FOR PROSTATE CANCER: ICD-10-CM

## 2021-11-19 LAB
ANION GAP SERPL CALCULATED.3IONS-SCNC: 2 MMOL/L (ref 3–14)
BUN SERPL-MCNC: 19 MG/DL (ref 7–30)
CALCIUM SERPL-MCNC: 8.7 MG/DL (ref 8.5–10.1)
CHLORIDE BLD-SCNC: 103 MMOL/L (ref 94–109)
CHOLEST SERPL-MCNC: 183 MG/DL
CO2 SERPL-SCNC: 31 MMOL/L (ref 20–32)
CREAT SERPL-MCNC: 0.99 MG/DL (ref 0.66–1.25)
FASTING STATUS PATIENT QL REPORTED: YES
GFR SERPL CREATININE-BSD FRML MDRD: 85 ML/MIN/1.73M2
GLUCOSE BLD-MCNC: 105 MG/DL (ref 70–99)
HDLC SERPL-MCNC: 48 MG/DL
HIV 1+2 AB+HIV1 P24 AG SERPL QL IA: NONREACTIVE
LDLC SERPL CALC-MCNC: 109 MG/DL
NONHDLC SERPL-MCNC: 135 MG/DL
POTASSIUM BLD-SCNC: 4.1 MMOL/L (ref 3.4–5.3)
PSA SERPL-MCNC: 5.07 UG/L (ref 0–4)
SODIUM SERPL-SCNC: 136 MMOL/L (ref 133–144)
TRIGL SERPL-MCNC: 132 MG/DL

## 2021-11-19 PROCEDURE — 80061 LIPID PANEL: CPT | Performed by: PHYSICIAN ASSISTANT

## 2021-11-19 PROCEDURE — 99396 PREV VISIT EST AGE 40-64: CPT | Performed by: PHYSICIAN ASSISTANT

## 2021-11-19 PROCEDURE — 87389 HIV-1 AG W/HIV-1&-2 AB AG IA: CPT | Performed by: PHYSICIAN ASSISTANT

## 2021-11-19 PROCEDURE — 80048 BASIC METABOLIC PNL TOTAL CA: CPT | Performed by: PHYSICIAN ASSISTANT

## 2021-11-19 PROCEDURE — G0103 PSA SCREENING: HCPCS | Performed by: PHYSICIAN ASSISTANT

## 2021-11-19 PROCEDURE — 36415 COLL VENOUS BLD VENIPUNCTURE: CPT | Performed by: PHYSICIAN ASSISTANT

## 2021-11-19 ASSESSMENT — ANXIETY QUESTIONNAIRES
1. FEELING NERVOUS, ANXIOUS, OR ON EDGE: SEVERAL DAYS
6. BECOMING EASILY ANNOYED OR IRRITABLE: NOT AT ALL
2. NOT BEING ABLE TO STOP OR CONTROL WORRYING: NOT AT ALL
7. FEELING AFRAID AS IF SOMETHING AWFUL MIGHT HAPPEN: NOT AT ALL
3. WORRYING TOO MUCH ABOUT DIFFERENT THINGS: NOT AT ALL
GAD7 TOTAL SCORE: 2
IF YOU CHECKED OFF ANY PROBLEMS ON THIS QUESTIONNAIRE, HOW DIFFICULT HAVE THESE PROBLEMS MADE IT FOR YOU TO DO YOUR WORK, TAKE CARE OF THINGS AT HOME, OR GET ALONG WITH OTHER PEOPLE: NOT DIFFICULT AT ALL
5. BEING SO RESTLESS THAT IT IS HARD TO SIT STILL: SEVERAL DAYS

## 2021-11-19 ASSESSMENT — MIFFLIN-ST. JEOR: SCORE: 1582.76

## 2021-11-19 ASSESSMENT — ENCOUNTER SYMPTOMS
COUGH: 0
FREQUENCY: 1
EYE PAIN: 0
PARESTHESIAS: 0
DYSURIA: 0
SORE THROAT: 0
ABDOMINAL PAIN: 0
FEVER: 0
HEADACHES: 0
CHILLS: 0
NERVOUS/ANXIOUS: 0
PALPITATIONS: 0
HEMATURIA: 0
DIARRHEA: 0
HEARTBURN: 0
HEMATOCHEZIA: 0
NAUSEA: 0
ARTHRALGIAS: 1
JOINT SWELLING: 0
DIZZINESS: 0
MYALGIAS: 0
SHORTNESS OF BREATH: 0
WEAKNESS: 0
CONSTIPATION: 0

## 2021-11-19 ASSESSMENT — PATIENT HEALTH QUESTIONNAIRE - PHQ9: 5. POOR APPETITE OR OVEREATING: NOT AT ALL

## 2021-11-19 ASSESSMENT — PAIN SCALES - GENERAL: PAINLEVEL: NO PAIN (0)

## 2021-11-19 NOTE — PATIENT INSTRUCTIONS
Www.validateBP.org --- check the HOME monitors tab    Shingles shot at pharmacy            Preventive Health Recommendations  Male Ages 50 - 64    Yearly exam:             See your health care provider every year in order to  o   Review health changes.   o   Discuss preventive care.    o   Review your medicines if your doctor has prescribed any.     Have a cholesterol test every 5 years, or more frequently if you are at risk for high cholesterol/heart disease.     Have a diabetes test (fasting glucose) every three years. If you are at risk for diabetes, you should have this test more often.     Have a colonoscopy at age 50, or have a yearly FIT test (stool test). These exams will check for colon cancer.      Talk with your health care provider about whether or not a prostate cancer screening test (PSA) is right for you.    You should be tested each year for STDs (sexually transmitted diseases), if you re at risk.     Shots: Get a flu shot each year. Get a tetanus shot every 10 years.     Nutrition:    Eat at least 5 servings of fruits and vegetables daily.     Eat whole-grain bread, whole-wheat pasta and brown rice instead of white grains and rice.     Get adequate Calcium and Vitamin D.     Lifestyle    Exercise for at least 150 minutes a week (30 minutes a day, 5 days a week). This will help you control your weight and prevent disease.     Limit alcohol to one drink per day.     No smoking.     Wear sunscreen to prevent skin cancer.     See your dentist every six months for an exam and cleaning.     See your eye doctor every 1 to 2 years.

## 2021-11-19 NOTE — PROGRESS NOTES
SUBJECTIVE:   CC: Castillo Allen is an 56 year old male who presents for preventative health visit.     Is Patient Fasting: Yes    \    Patient has been advised of split billing requirements and indicates understanding: Yes  Healthy Habits:     Getting at least 3 servings of Calcium per day:  NO    Bi-annual eye exam:  Yes    Dental care twice a year:  Yes    Sleep apnea or symptoms of sleep apnea:  None    Diet:  Breakfast skipped    Frequency of exercise:  4-5 days/week    Duration of exercise:  15-30 minutes    Taking medications regularly:  Not Applicable    Medication side effects:  Not applicable    PHQ-2 Total Score: 0    Additional concerns today:  Yes (discuss blood pressure, frequent urination )    Castillo Allen is a 56 year old male who presents today for annual check up  He has been working to lose weight - increased exercise and some intermittent fasting  Skipping breakfast  Feels ok on energy;     Urinary frequency  Going around 2-3x/night; this is newer over the past year  Drinking a lot more water; up until bed time  Estimates 40-60oz      Today's PHQ-2 Score:   PHQ-2 ( 1999 [employer]) 11/19/2021   Q1: Little interest or pleasure in doing things 0   Q2: Feeling down, depressed or hopeless 0   PHQ-2 Score 0   PHQ-2 Total Score (12-17 Years)- Positive if 3 or more points; Administer PHQ-A if positive -   Q1: Little interest or pleasure in doing things Not at all   Q2: Feeling down, depressed or hopeless Not at all   PHQ-2 Score 0       Abuse: Current or Past(Physical, Sexual or Emotional)- No  Do you feel safe in your environment? Yes    Have you ever done Advance Care Planning? (For example, a Health Directive, POLST, or a discussion with a medical provider or your loved ones about your wishes): No, advance care planning information given to patient to review.  Patient declined advance care planning discussion at this time.    Social History     Tobacco Use     Smoking status: Never Smoker     Smokeless  tobacco: Never Used   Substance Use Topics     Alcohol use: Yes     Alcohol/week: 1.0 standard drink     If you drink alcohol do you typically have >3 drinks per day or >7 drinks per week? No    Alcohol Use 11/19/2021   Prescreen: >3 drinks/day or >7 drinks/week? No   Prescreen: >3 drinks/day or >7 drinks/week? -       Last PSA:   PSA   Date Value Ref Range Status   09/13/2017 3.18 0 - 4 ug/L Final     Comment:     Assay Method:  Chemiluminescence using Siemens Vista analyzer       Reviewed orders with patient. Reviewed health maintenance and updated orders accordingly - Yes      Reviewed and updated as needed this visit by clinical staff                Reviewed and updated as needed this visit by Provider                 Review of Systems   Constitutional: Negative for chills and fever.   HENT: Positive for hearing loss. Negative for congestion, ear pain and sore throat.    Eyes: Negative for pain and visual disturbance.   Respiratory: Negative for cough and shortness of breath.    Cardiovascular: Negative for chest pain, palpitations and peripheral edema.   Gastrointestinal: Negative for abdominal pain, constipation, diarrhea, heartburn, hematochezia and nausea.   Genitourinary: Positive for frequency and urgency. Negative for dysuria, genital sores, hematuria, impotence and penile discharge.   Musculoskeletal: Positive for arthralgias. Negative for joint swelling and myalgias.   Skin: Negative for rash.   Neurological: Negative for dizziness, weakness, headaches and paresthesias.   Psychiatric/Behavioral: Negative for mood changes. The patient is not nervous/anxious.          OBJECTIVE:   There were no vitals taken for this visit.    Physical Exam  GENERAL: healthy, alert and no distress  EYES: Eyes grossly normal to inspection, PERRL and conjunctivae and sclerae normal  HENT: ear canals and TM's normal, nose and mouth without ulcers or lesions  NECK: no adenopathy, no asymmetry, masses, or scars and thyroid  "normal to palpation  RESP: lungs clear to auscultation - no rales, rhonchi or wheezes  CV: regular rate and rhythm, normal S1 S2, no S3 or S4, no murmur, click or rub, no peripheral edema and peripheral pulses strong  ABDOMEN: soft, nontender, no hepatosplenomegaly, no masses and bowel sounds normal   (male): normal male genitalia without lesions or urethral discharge, no hernia  MS: no gross musculoskeletal defects noted, no edema  SKIN: no suspicious lesions or rashes  PSYCH: mentation appears normal, affect normal/bright    Diagnostic Test Results:  Labs reviewed in Epic  Updating today    ASSESSMENT/PLAN:   1. Routine general medical examination at a health care facility  Reviewed personal and family history. Reviewed age appropriate screenings. Recommended any needed vaccinations. Needs shingles  - REVIEW OF HEALTH MAINTENANCE PROTOCOL ORDERS  - Lipid panel reflex to direct LDL Fasting; Future  - Basic metabolic panel  (Ca, Cl, CO2, Creat, Gluc, K, Na, BUN); Future  - Lipid panel reflex to direct LDL Fasting  - Basic metabolic panel  (Ca, Cl, CO2, Creat, Gluc, K, Na, BUN)    2. Screening for prostate cancer  Due for repeat. There is some frequency, though he's been increasing his fluid consumption  - PSA, screen; Future  - PSA, screen    3. Screening for HIV (human immunodeficiency virus)  Per CDC  - HIV Antigen Antibody Combo; Future  - HIV Antigen Antibody Combo    4. CARDIOVASCULAR SCREENING; LDL GOAL LESS THAN 160  Updating labs; hes working on diet  - Lipid panel reflex to direct LDL Fasting; Future  - Lipid panel reflex to direct LDL Fasting      Patient has been advised of split billing requirements and indicates understanding: Yes  COUNSELING:   Reviewed preventive health counseling, as reflected in patient instructions    Estimated body mass index is 28.13 kg/m  as calculated from the following:    Height as of 1/29/20: 1.727 m (5' 8\").    Weight as of 1/29/20: 83.9 kg (185 lb).       He reports that " he has never smoked. He has never used smokeless tobacco.      Counseling Resources:  ATP IV Guidelines  Pooled Cohorts Equation Calculator  FRAX Risk Assessment  ICSI Preventive Guidelines  Dietary Guidelines for Americans, 2010  USDA's MyPlate  ASA Prophylaxis  Lung CA Screening    JAKE Lopez [facility]

## 2021-11-20 ASSESSMENT — ANXIETY QUESTIONNAIRES: GAD7 TOTAL SCORE: 2

## 2021-11-20 ASSESSMENT — PATIENT HEALTH QUESTIONNAIRE - PHQ9: SUM OF ALL RESPONSES TO PHQ QUESTIONS 1-9: 0

## 2021-12-07 ENCOUNTER — DOCUMENTATION ONLY (OUTPATIENT)
Dept: LAB | Facility: CLINIC | Age: 56
End: 2021-12-07
Payer: COMMERCIAL

## 2021-12-07 DIAGNOSIS — R97.20 ELEVATED PROSTATE SPECIFIC ANTIGEN (PSA): Primary | ICD-10-CM

## 2021-12-07 NOTE — PROGRESS NOTES
This patient has scheduled a lab visit but has no orders. He references his PSA, which was elevated. Is he supposed to return? Order? Thanks Esperanza

## 2021-12-20 ENCOUNTER — LAB (OUTPATIENT)
Dept: LAB | Facility: CLINIC | Age: 56
End: 2021-12-20
Payer: COMMERCIAL

## 2021-12-20 DIAGNOSIS — R97.20 ELEVATED PROSTATE SPECIFIC ANTIGEN (PSA): ICD-10-CM

## 2021-12-20 PROCEDURE — 36415 COLL VENOUS BLD VENIPUNCTURE: CPT

## 2021-12-20 PROCEDURE — G0103 PSA SCREENING: HCPCS

## 2021-12-21 LAB — PSA SERPL-MCNC: 4.96 UG/L (ref 0–4)

## 2022-03-11 ENCOUNTER — OFFICE VISIT (OUTPATIENT)
Dept: DERMATOLOGY | Facility: CLINIC | Age: 57
End: 2022-03-11
Payer: COMMERCIAL

## 2022-03-11 VITALS — HEART RATE: 77 BPM | OXYGEN SATURATION: 99 % | DIASTOLIC BLOOD PRESSURE: 81 MMHG | SYSTOLIC BLOOD PRESSURE: 133 MMHG

## 2022-03-11 DIAGNOSIS — L81.4 LENTIGO: ICD-10-CM

## 2022-03-11 DIAGNOSIS — D18.01 ANGIOMA OF SKIN: ICD-10-CM

## 2022-03-11 DIAGNOSIS — L82.1 SEBORRHEIC KERATOSIS: ICD-10-CM

## 2022-03-11 DIAGNOSIS — D22.9 NEVUS: Primary | ICD-10-CM

## 2022-03-11 DIAGNOSIS — L64.9 ANDROGENIC ALOPECIA: ICD-10-CM

## 2022-03-11 PROCEDURE — 99203 OFFICE O/P NEW LOW 30 MIN: CPT | Performed by: PHYSICIAN ASSISTANT

## 2022-03-11 NOTE — LETTER
3/11/2022         RE: Castillo Allen  [id number]  [facility] [id number]        Dear Colleague,    Thank you for referring your patient, Castillo Allen, to the [facility]. Please see a copy of my visit note below.    HPI:   Chief complaints: Castillo Allen is a pleasant 57 year old male who presents for Full skin cancer screening to rule out skin cancer   Last Skin Exam: n/a      1st Baseline: yes  Personal HX of Skin Cancer: no   Personal HX of Malignant Melanoma: no   Family HX of Skin Cancer / Malignant Melanoma: no  Personal HX of Atypical Moles:   no  Risk factors: history of sun exposure and burns  New / Changing lesions:Yes he is wondering about hair loss treatments. He also had a non healing spot int he front of the scalp but now it seems like it has resolved.   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   On review of systems, there are no further skin complaints, patient is feeling otherwise well.   ROS of the following were done and are negative: Constitutional, Eyes, Ears, Nose,   Mouth, Throat, Cardiovascular, Respiratory, GI, Genitourinary, Musculoskeletal,   Psychiatric, Endocrine, Allergic/Immunologic.    PHYSICAL EXAM:   /81   Pulse 77   SpO2 99%   Skin exam performed as follows: Type 3 skin. Mood appropriate  Alert and Oriented X 3. Well developed, well nourished in no distress.  General appearance: Normal  Head including face: Normal  Eyes: conjunctiva and lids: Normal  Mouth: Lips, teeth, gums: Normal  Neck: Normal  Chest-breast/axillae: Normal  Back: Normal  Spleen and liver: Normal  Cardiovascular: Exam of peripheral vascular system by observation for swelling, varicosities, edema: Normal  Genitalia: groin, buttocks: Normal  Extremities: digits/nails (clubbing): Normal  Eccrine and Apocrine glands: Normal  Right upper extremity: Normal  Left upper extremity: Normal  Right lower extremity: Normal  Left lower extremity:  Normal  Skin: Scalp and body hair: See below    Pt deferred exam of breasts, groin, buttocks: No    Other physical findings:  1. Multiple pigmented macules on extremities and trunk  2. Multiple pigmented macules on face, trunk and extremities  3. Multiple vascular papules on trunk, arms and legs  4. Multiple scattered keratotic plaques       Except as noted above, no other signs of skin cancer or melanoma.     ASSESSMENT/PLAN:   Benign Full skin cancer screening today. . Patient with history of none  Advised on monthly self exams and 1 year  Patient Education: Appropriate brochures given.    1. Multiple benign appearing melanocytic nevi on arms, legs and trunk. Discussed ABCDEs of melanoma and sunscreen.   2. Multiple lentigos on arms, legs and trunk. Advised benign, no treatment needed.  3. Multiple scattered angiomas. Advised benign, no treatment needed.   4. Seborrheic keratosis on arms, legs and trunk. Advised benign, no treatment needed.  5. Androgenic alopecia - discussed rogaine vs finasteride. He will think about options.               Follow-up: FSE every 1-2 years/PRN sooner    1.) Patient was asked about new and changing moles. YES  2.) Patient received a complete physical skin examination: YES  3.) Patient was counseled to perform a monthly self skin examination: YES  Scribed By: Paula Perkins, MS, PASUE          Again, thank you for allowing me to participate in the care of your patient.        Sincerely,        Paula Perkins PA-C

## 2022-03-11 NOTE — PROGRESS NOTES
HPI:   Chief complaints: Castillo Allen is a pleasant 57 year old male who presents for Full skin cancer screening to rule out skin cancer   Last Skin Exam: n/a      1st Baseline: yes  Personal HX of Skin Cancer: no   Personal HX of Malignant Melanoma: no   Family HX of Skin Cancer / Malignant Melanoma: no  Personal HX of Atypical Moles:   no  Risk factors: history of sun exposure and burns  New / Changing lesions:Yes he is wondering about hair loss treatments. He also had a non healing spot int he front of the scalp but now it seems like it has resolved.   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   On review of systems, there are no further skin complaints, patient is feeling otherwise well.   ROS of the following were done and are negative: Constitutional, Eyes, Ears, Nose,   Mouth, Throat, Cardiovascular, Respiratory, GI, Genitourinary, Musculoskeletal,   Psychiatric, Endocrine, Allergic/Immunologic.    PHYSICAL EXAM:   /81   Pulse 77   SpO2 99%   Skin exam performed as follows: Type 3 skin. Mood appropriate  Alert and Oriented X 3. Well developed, well nourished in no distress.  General appearance: Normal  Head including face: Normal  Eyes: conjunctiva and lids: Normal  Mouth: Lips, teeth, gums: Normal  Neck: Normal  Chest-breast/axillae: Normal  Back: Normal  Spleen and liver: Normal  Cardiovascular: Exam of peripheral vascular system by observation for swelling, varicosities, edema: Normal  Genitalia: groin, buttocks: Normal  Extremities: digits/nails (clubbing): Normal  Eccrine and Apocrine glands: Normal  Right upper extremity: Normal  Left upper extremity: Normal  Right lower extremity: Normal  Left lower extremity: Normal  Skin: Scalp and body hair: See below    Pt deferred exam of breasts, groin, buttocks: No    Other physical findings:  1. Multiple pigmented macules on extremities and trunk  2. Multiple pigmented macules on face, trunk and extremities  3. Multiple vascular  papules on trunk, arms and legs  4. Multiple scattered keratotic plaques       Except as noted above, no other signs of skin cancer or melanoma.     ASSESSMENT/PLAN:   Benign Full skin cancer screening today. . Patient with history of none  Advised on monthly self exams and 1 year  Patient Education: Appropriate brochures given.    1. Multiple benign appearing melanocytic nevi on arms, legs and trunk. Discussed ABCDEs of melanoma and sunscreen.   2. Multiple lentigos on arms, legs and trunk. Advised benign, no treatment needed.  3. Multiple scattered angiomas. Advised benign, no treatment needed.   4. Seborrheic keratosis on arms, legs and trunk. Advised benign, no treatment needed.  5. Androgenic alopecia - discussed rogaine vs finasteride. He will think about options.               Follow-up: FSE every 1-2 years/PRN sooner    1.) Patient was asked about new and changing moles. YES  2.) Patient received a complete physical skin examination: YES  3.) Patient was counseled to perform a monthly self skin examination: YES  Scribed By: Paula Perkins MS, PASUE

## 2022-06-28 ENCOUNTER — VIRTUAL VISIT (OUTPATIENT)
Dept: FAMILY MEDICINE | Facility: CLINIC | Age: 57
End: 2022-06-28
Payer: COMMERCIAL

## 2022-06-28 DIAGNOSIS — R41.840 ATTENTION AND CONCENTRATION DEFICIT: Primary | ICD-10-CM

## 2022-06-28 DIAGNOSIS — F43.23 ADJUSTMENT DISORDER WITH MIXED ANXIETY AND DEPRESSED MOOD: ICD-10-CM

## 2022-06-28 PROCEDURE — 99214 OFFICE O/P EST MOD 30 MIN: CPT | Mod: 95 | Performed by: PHYSICIAN ASSISTANT

## 2022-06-28 RX ORDER — BUPROPION HYDROCHLORIDE 150 MG/1
150 TABLET ORAL EVERY MORNING
Qty: 90 TABLET | Refills: 0 | Status: SHIPPED | OUTPATIENT
Start: 2022-06-28 | End: 2023-03-02

## 2022-06-28 ASSESSMENT — PATIENT HEALTH QUESTIONNAIRE - PHQ9: SUM OF ALL RESPONSES TO PHQ QUESTIONS 1-9: 2

## 2022-06-28 NOTE — PROGRESS NOTES
"Ry is a 57 year old who is being evaluated via a billable video visit.      How would you like to obtain your AVS? MyChart  If the video visit is dropped, the invitation should be resent by: Text to cell phone: [phone]  Will anyone else be joining your video visit? No          Assessment & Plan     Attention and concentration deficit  Adjustment disorder with mixed anxiety and depressed mood  With the slight increase of symptoms, and concomitant thoughts on concentration, will try below. Recommend adding back daily activity levels as well and to consider therapy. Touch base in 6-8 weeks virtually  - Adult Mental Health  Referral; Future  - buPROPion (WELLBUTRIN XL) 150 MG 24 hr tablet; Take 1 tablet (150 mg) by mouth every morning           BMI:   Estimated body mass index is 27.08 kg/m  as calculated from the following:    Height as of 11/19/21: 1.708 m (5' 7.25\").    Weight as of 11/19/21: 79 kg (174 lb 3.2 oz).           Return in about 2 months (around 8/28/2022) for Virtual Med Check.    JAKE Lopez [facility]    Subjective   Ry is a 57 year old, presenting for the following health issues:  Depression      HPI     Abnormal Mood Symptoms  Onset/Duration: Couple weeks  Description: see below  Depression (if yes, do PHQ-9): YES  Anxiety (if yes, do BEVERLY-7): no  Accompanying Signs & Symptoms:  Still participating in activities that you used to enjoy: YES  Fatigue: no  Irritability: YES-   Difficulty concentrating: no  Changes in appetite: no  Problems with sleep: no  Heart racing/beating fast: no  Abnormally elevated, expansive, or irritable mood: no  Persistently increased activity or energy: no  Thoughts of hurting yourself or others: no  History:  Recent stress or major life event: no  Prior depression or anxiety: in the past   Family history of depression or anxiety: YES- him personally  Alcohol/drug use: no  Difficulty sleeping: no  Precipitating or " alleviating factors: None  Therapies tried and outcome: none  PHQ 11/12/2020 11/19/2021 6/28/2022   PHQ-9 Total Score 8 0 2   Q9: Thoughts of better off dead/self-harm past 2 weeks Not at all Not at all Not at all     BEVERLY-7 SCORE 8/14/2019 11/12/2020 11/19/2021   Total Score 1 9 2     Castillo Allen is a 57 year old male who presents today for new onset mood changes  Overall he mentions he feels well overall most of the time  Life stressors are not any different than he normally has on a daily basis  It is just the common thinking about the future or all of the things on his plate  This will get him down and most of the time he can pick himself up  Not always  Sleep is going ok   Less exercise than previous  No financial stressors; work ok but still part time  Does additionally wonder if add playing a role; still having trouble at times with concentration; following along discussion    Review of Systems   Constitutional, HEENT, cardiovascular, pulmonary, gi and gu systems are negative, except as otherwise noted.      Objective           Vitals:  No vitals were obtained today due to virtual visit.    Physical Exam   GENERAL: Healthy, alert and no distress  EYES: Eyes grossly normal to inspection.  No discharge or erythema, or obvious scleral/conjunctival abnormalities.  RESP: No audible wheeze, cough, or visible cyanosis.  No visible retractions or increased work of breathing.    SKIN: Visible skin clear. No significant rash, abnormal pigmentation or lesions.  NEURO: Cranial nerves grossly intact.  Mentation and speech appropriate for age.  PSYCH: Mentation appears normal, affect normal/bright, judgement and insight intact, normal speech and appearance well-groomed.                Video-Visit Details    Video Start Time: 5:11 PM    Type of service:  Video Visit    Video End Time:5:38 PM    Originating Location (pt. Location): Home    Distant Location (provider location):  [facility]      Platform used for Video Visit: Marie Means.

## 2022-08-05 ENCOUNTER — OFFICE VISIT (OUTPATIENT)
Dept: INTERNAL MEDICINE | Facility: CLINIC | Age: 57
End: 2022-08-05
Payer: COMMERCIAL

## 2022-08-05 VITALS
OXYGEN SATURATION: 97 % | DIASTOLIC BLOOD PRESSURE: 80 MMHG | RESPIRATION RATE: 16 BRPM | WEIGHT: 177.1 LBS | SYSTOLIC BLOOD PRESSURE: 126 MMHG | HEART RATE: 78 BPM | BODY MASS INDEX: 27.53 KG/M2 | TEMPERATURE: 98.5 F

## 2022-08-05 DIAGNOSIS — M54.2 NECK PAIN ON LEFT SIDE: Primary | ICD-10-CM

## 2022-08-05 PROCEDURE — 99213 OFFICE O/P EST LOW 20 MIN: CPT | Performed by: PHYSICIAN ASSISTANT

## 2022-08-05 NOTE — PROGRESS NOTES
"  Assessment & Plan     Neck pain on left side  No mass or enlarged lymph node felt in the pain  Pain along the sternocleidomastoid area  Icing heat and NSAID   Monitor if not improving in 3-4 week recheck/ consider imaging                 BMI:   Estimated body mass index is 27.53 kg/m  as calculated from the following:    Height as of 11/19/21: 1.708 m (5' 7.25\").    Weight as of this encounter: 80.3 kg (177 lb 1.6 oz).           Return in about 4 weeks (around 9/2/2022) for recheck if not improving.    JAKE Sosa [facility] TAMMY Raza is a 57 year old, presenting for the following health issues:  Throat Problem      History of Present Illness       Reason for visit:  Tender neck lymph node  Symptom onset:  3-7 days ago  Symptoms include:  Tender neck lymph node  Symptom intensity:  Mild  Symptom progression:  Staying the same  Had these symptoms before:  No  What makes it worse:  No  What makes it better:  No    He eats 0-1 servings of fruits and vegetables daily.He consumes 0 sweetened beverage(s) daily.He exercises with enough effort to increase his heart rate 9 or less minutes per day.  He exercises with enough effort to increase his heart rate 3 or less days per week.   He is taking medications regularly.       Patient with some left side neck pain noted about 1 week ago   Felt initially up under the left jawline - that has since improved  Now noted pain more laterally in the neck below the ear   No ear pain, no sore throat   No rashes noted in that area         Review of Systems   Constitutional, HEENT, cardiovascular, pulmonary, gi and gu systems are negative, except as otherwise noted.      Objective    /80   Pulse 78   Temp 98.5  F (36.9  C) (Tympanic)   Resp 16   Wt 80.3 kg (177 lb 1.6 oz)   SpO2 97%   BMI 27.53 kg/m    Body mass index is 27.53 kg/m .  Physical Exam   GENERAL: healthy, alert and no distress  HENT: normal " cephalic/atraumatic, ear canals and TM's normal, oropharynx clear and oral mucous membranes moist  NECK: no adenopathy, no asymmetry, masses, or scars and tenderness in the area of the sternocleidomastoid on the left   RESP: lungs clear to auscultation - no rales, rhonchi or wheezes  CV: regular rates and rhythm  SKIN: no suspicious lesions or rashes                    .  ..

## 2022-11-19 ENCOUNTER — HEALTH MAINTENANCE LETTER (OUTPATIENT)
Age: 57
End: 2022-11-19

## 2023-01-23 ENCOUNTER — DOCUMENTATION ONLY (OUTPATIENT)
Dept: PSYCHOLOGY | Facility: CLINIC | Age: 58
End: 2023-01-23
Payer: COMMERCIAL

## 2023-01-23 ENCOUNTER — VIRTUAL VISIT (OUTPATIENT)
Dept: PSYCHOLOGY | Facility: CLINIC | Age: 58
End: 2023-01-23
Payer: COMMERCIAL

## 2023-01-23 DIAGNOSIS — F43.23 ADJUSTMENT DISORDER WITH MIXED ANXIETY AND DEPRESSED MOOD: Primary | ICD-10-CM

## 2023-01-23 PROCEDURE — 90791 PSYCH DIAGNOSTIC EVALUATION: CPT | Mod: GT | Performed by: PSYCHOLOGIST

## 2023-01-23 ASSESSMENT — COLUMBIA-SUICIDE SEVERITY RATING SCALE - C-SSRS
TOTAL  NUMBER OF INTERRUPTED ATTEMPTS LIFETIME: NO
TOTAL  NUMBER OF ABORTED OR SELF INTERRUPTED ATTEMPTS LIFETIME: NO
1. IN THE PAST MONTH, HAVE YOU WISHED YOU WERE DEAD OR WISHED YOU COULD GO TO SLEEP AND NOT WAKE UP?: NO
2. HAVE YOU ACTUALLY HAD ANY THOUGHTS OF KILLING YOURSELF?: NO
REASONS FOR IDEATION LIFETIME: MOSTLY TO END OR STOP THE PAIN (YOU COULDN'T GO ON LIVING WITH THE PAIN OR HOW YOU WERE FEELING)
ATTEMPT LIFETIME: NO
1. HAVE YOU WISHED YOU WERE DEAD OR WISHED YOU COULD GO TO SLEEP AND NOT WAKE UP?: YES
6. HAVE YOU EVER DONE ANYTHING, STARTED TO DO ANYTHING, OR PREPARED TO DO ANYTHING TO END YOUR LIFE?: NO

## 2023-01-23 ASSESSMENT — ANXIETY QUESTIONNAIRES
6. BECOMING EASILY ANNOYED OR IRRITABLE: NOT AT ALL
3. WORRYING TOO MUCH ABOUT DIFFERENT THINGS: SEVERAL DAYS
7. FEELING AFRAID AS IF SOMETHING AWFUL MIGHT HAPPEN: SEVERAL DAYS
GAD7 TOTAL SCORE: 8
IF YOU CHECKED OFF ANY PROBLEMS ON THIS QUESTIONNAIRE, HOW DIFFICULT HAVE THESE PROBLEMS MADE IT FOR YOU TO DO YOUR WORK, TAKE CARE OF THINGS AT HOME, OR GET ALONG WITH OTHER PEOPLE: SOMEWHAT DIFFICULT
5. BEING SO RESTLESS THAT IT IS HARD TO SIT STILL: SEVERAL DAYS
GAD7 TOTAL SCORE: 8
1. FEELING NERVOUS, ANXIOUS, OR ON EDGE: MORE THAN HALF THE DAYS
2. NOT BEING ABLE TO STOP OR CONTROL WORRYING: MORE THAN HALF THE DAYS

## 2023-01-23 ASSESSMENT — PATIENT HEALTH QUESTIONNAIRE - PHQ9
5. POOR APPETITE OR OVEREATING: SEVERAL DAYS
SUM OF ALL RESPONSES TO PHQ QUESTIONS 1-9: 4

## 2023-01-23 NOTE — PROGRESS NOTES
[facility]      PATIENT'S NAME: Castillo Allen  PREFERRED NAME: Ry  PRONOUNS:    He/His/Him  MRN: [id number]  : 1965  ADDRESS: [address] [place] [address]  ACCT. NUMBER:  [id number]  DATE OF SERVICE: 23  START TIME: 9:00  END TIME: 9:47  PREFERRED PHONE: [phone]  May we leave a program related message: Yes  SERVICE MODALITY:  Video Visit:      Provider verified identity through the following two step process.  Patient provided:  Patient     Telemedicine Visit: The patient's condition can be safely assessed and treated via synchronous audio and visual telemedicine encounter.      Reason for Telemedicine Visit: Services only offered telehealth    Originating Site (Patient Location): Patient's home    Distant Site (Provider Location): Provider Remote Setting- Home Office    Consent:  The patient/guardian has verbally consented to: the potential risks and benefits of telemedicine (video visit) versus in person care; bill my insurance or make self-payment for services provided; and responsibility for payment of non-covered services.     Patient would like the video invitation sent by:  My Chart    Mode of Communication:  Video Conference via REHAPP    Distant Location (Provider):  Off-site    As the provider I attest to compliance with applicable laws and regulations related to telemedicine.    UNIVERSAL ADULT Mental Health DIAGNOSTIC ASSESSMENT    Identifying Information:  Patient is a 57 year old,    male.  Patient was referred for an assessment by self.  Patient attended the session alone.    Chief Complaint:   The purpose of this evaluation is to: provide treatment recommendations and clarify diagnosis. Patient reported seeking services at this time for diagnostic assessment and recommendations for treatment.  Patient reported that he has not completed a previous ADHD diagnostic assessment.  Patient has not received a previous diagnosis of  "ADHD. Patient reported that medication has not been prescribed medication to address these problems. Client reported that is is hard for him to focus and concentrate. He is very forgetful (\"this could be that I'm getting older and things aren't working quite as well\"). He can be \"absent minded\" and this has always been the case, but it seems to be getting worse lately. He will be doing something and if he gets distracted, he will get off task and then forget to go back to the original task. He leaves tasks incomplete/unfinished. He is disorganized; his desk is messy and there are piles of things \"all over it.\" He explained that he has all of the items out, because if he puts it away somewhere it will be \"out of sight out of mind\" and he won't remember to get back to it. He does not misplace/lose things frequently. It is had to listen to conversations in loud situations (e.g., he hears background noises and it is difficult to focus on what people are saying\"). He tries to listen to what is being said to him and it is very effortful to do so; by the time he processes what was said, the conversation has moved on. it is hard for him to keep up. People have to repeat themselves. He will read a paragraph and have no idea what he just read and he will have to go back and re-read. He may interrupt people at times (he has to say something or he will forget to say it later - \"I have to get it out\").       Social/Family History:  Patient reported they grew up in  [place], [place].  They were raised by biological parents  .  Parents . His father passed when Client was in his 30s. Client was the third born of five children. He has two brothers and two sisters. Patient reported that their childhood was \"different.\" He stated, \"We were kind of the nerdy family. We got along fine, but I never pictured us as the typical/traditional family.\" Client explained that \"everybody in my family is very intelligent, and that bothered me " "growing up.\" He went to a very small elementary school (8 kids in each grade) and he was always the smartest one in the class and he didn't like the attention \"and everyone expected me to know the answers and I just wanted to be like everybody else.\"  Patient described their current relationships with family of origin as good.     The patient describes their cultural background as .  Cultural influences and impact on patient's life structure, values, norms, and healthcare: Grew up in a small town..  Contextual influences on patient's health include: Contextual Factors: Family Factors children with MH issues; wife with medical concerns.  These factors will be addressed in the Preliminary Treatment plan. Patient identified their preferred language to be English. Patient reported they does not need the assistance of an  or other support involved in therapy.     Patient reported had no significant delays in developmental tasks.   Patient's highest education level was graduate school  .  Client has a master's degree in physical therapy. Patient identified the following learning problems: none reported. When Client was young, he was the \"smart one\" in his class and that wasn't what he wanted to be. He stated, \"I don't work very hard and that made college and PT school a challenge.\" He didn't have to study hard. He would go to class and listen \"and it just all soaked in and I knew the information.\" When he got to PT school the information was \"too much\" and stopped \"soaking in.\" He couldn't focus and his classmates teased him about this (\"I tried to study but couldn't get anything done because Ry was there\"). He would get up repeatedly and talk to people. Modifications will not be used to assist communication in therapy. Patient reports they are  able to understand written materials.    Patient reported the following relationship history one marriage.  Patient's current relationship status is  " "for 28 years. Client's wife has had a lot of medical issues (breast cancer, DVT, and pulmonary embolisms recently). Client has been caring for her. Patient identified their sexual orientation as heterosexual.  Patient reported having 3 child(zulay); sons ages 25, 23, and 20. Two of Client's sons have autism and anxiety and ADHD. Their youngest son has anxiety and social anxiety. Patient identified spouse as part of their support system.  Patient identified the quality of these relationships as good,  .      Patient's current living/housing situation involves staying in own home/apartment.  The immediate members of family and household include Latoya Allen, 52,Wife eldest son (age 25 \"profoundly autistic\"; and 20 year old son and they report that housing is stable.    Patient is currently employed part time. He works as a physical therapist in rehab-long term care. He hasn't worked in a month now since caring for his wife with all of her surgeries. 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 Client has been working part-time since that time. Patient reports their finances are obtained through employment; spouse. Patient does not identify finances as a current stressor.      Patient reported that they have not been involved with the legal system.   Patient does not report being under probation/ parole/ jurisdiction. They are not under any current court jurisdiction. .    Patient's Strengths and Limitations:  Patient identified the following strengths or resources that will help them succeed in treatment: commitment to health and well being, friends / good social support, family support, insight, intelligence, motivation, sense of humor, strong social skills and work ethic. Things that may interfere with the patient's " "success in treatment include: none identified.     Assessments:  The following assessments were completed by patient for this visit:  PHQ9:   PHQ-9 SCORE 9/23/2016 10/13/2017 8/14/2019 11/12/2020 11/19/2021 6/28/2022 1/23/2023   PHQ-9 Total Score 10 12 2 8 0 2 4     GAD7:   BEVERLY-7 SCORE 5/18/2016 9/23/2016 10/13/2017 8/14/2019 11/12/2020 11/19/2021 1/23/2023   Total Score 1 13 12 1 9 2 8     [place] Suicide Severity Rating Scale (Lifetime/Recent)  [place] Suicide Severity Rating (Lifetime/Recent) 1/23/2023   1. Wish to be Dead (Lifetime) 1   Wish to be Dead Description (Lifetime) Client had SI when he was \"young\" (high school) -passive thoughts about not being around   1. Wish to be Dead (Past 1 Month) 0   2. Non-Specific Active Suicidal Thoughts (Lifetime) 0   Most Severe Ideation Rating (Lifetime) 1   Description of Most Severe Ideation (Lifetime) Client had SI when he was \"young\" (high school) -passive thoughts about not being around   Frequency (Lifetime) 1   Duration (Lifetime) 1   Controllability (Lifetime) 2   Deterrents (Lifetime) 0   Reasons for Ideation (Lifetime) 4   Actual Attempt (Lifetime) 0   Has subject engaged in non-suicidal self-injurious behavior? (Lifetime) 0   Interrupted Attempts (Lifetime) 0   Aborted or Self-Interrupted Attempt (Lifetime) 0   Preparatory Acts or Behavior (Lifetime) 0   Calculated C-SSRS Risk Score (Lifetime/Recent) No Risk Indicated        Personal and Family Medical History:  Patient does report a family history of mental health concerns.  Patient reports family history includes Anxiety Disorder in his brother, brother, son, and son; Attention Deficit Disorder in his son and son; Autism Spectrum Disorder in his son and son; Bipolar Disorder in his sister; Cancer - colorectal in his father; Cerebrovascular Disease in his maternal grandfather; Colon Cancer in his father; Depression in his mother, sister, sister, son, and son; Diabetes in his brother, maternal grandmother, " "and mother; Hypertension in his father and mother; Obesity in his brother; Thyroid Disease in his brother, brother, mother, and sister..     Patient does report Mental Health Diagnosis and/or Treatment. Patient reported the following previous diagnoses which includes: an Anxiety Disorder and Depression (Adjustment Disorder).  Patient reported symptoms began in middle school (depression). Client thinks that anxiety has \"always been there.\" Client explained that when he was younger, depression was \"a little more than situational.\" He stated, \"It wasn't talked about as much so I didn't get treatment for it.\"  Patient has received mental health services in the past: medication from PCP (Wellbutrin). He took it for a while, but decided to stop taking it. Client believes depression has always been situational (\"I just need help getting through the situation and then when it passes, I feel better.\"  Psychiatric Hospitalizations: None.  Patient denies a history of civil commitment.  Patient is not receiving other mental health services.  These include none.       Patient has had a physical exam to rule out medical causes for current symptoms.  Date of last physical exam was within the past year. Client was encouraged to follow up with PCP if symptoms were to develop. The patient has a [facility] Provider, who is named Nicolas Escobedo..  Patient reports no current medical concerns.  Patient denies any issues with pain. There are not significant appetite / nutritional concerns / weight changes.   Patient does not report a history of head injury / trauma / cognitive impairment.      Patient reports not taking any current medications    Medication Adherence:  Patient reports not currently prescribed.      Patient Allergies:    Allergies   Allergen Reactions     Seasonal Allergies        Medical History:    Past Medical History:   Diagnosis Date     Allergic rhinitis, cause unspecified      Depressive disorder  "    Situational at times     Hypertension 11/1/2020         Current Mental Status Exam:   Appearance:  Appropriate    Eye Contact:  Good   Psychomotor:  Normal       Gait / station:  no problem  Attitude / Demeanor: Cooperative   Speech      Rate / Production: Normal/ Responsive      Volume:  Normal  volume      Language:  intact, no problems and good  Mood:   Normal  Affect:   Appropriate    Thought Content: Clear   Thought Process: Goal Directed  Logical       Associations: No loosening of associations  Insight:   Good   Judgment:  Intact   Orientation:  All  Attention/concentration: Good      Substance Use:  Patient did not report a family history of substance use concerns; see medical history section for details.  Patient has not received chemical dependency treatment in the past.  Patient has not ever been to detox.      Patient is not currently receiving any chemical dependency treatment.           Substance History of use Age of first use Date of last use     Pattern and duration of use (include amounts and frequency)   Alcohol currently use   20 01/08/23 *drinks socially REPORTS SUBSTANCE USE: reports using substance 2 times per week and has 1-3 drinks at a time.   Patient reports heaviest use is current use.   Cannabis   never used     REPORTS SUBSTANCE USE: N/A     Amphetamines   never used     REPORTS SUBSTANCE USE: N/A   Cocaine/crack    never used       REPORTS SUBSTANCE USE: N/A   Hallucinogens never used         REPORTS SUBSTANCE USE: N/A   Inhalants never used         REPORTS SUBSTANCE USE: N/A   Heroin never used         REPORTS SUBSTANCE USE: N/A   Other Opiates never used     REPORTS SUBSTANCE USE: N/A   Benzodiazepine   never used     REPORTS SUBSTANCE USE: N/A   Barbiturates never used     REPORTS SUBSTANCE USE: N/A   Over the counter meds never used     REPORTS SUBSTANCE USE: N/A   Caffeine currently use 10   REPORTS SUBSTANCE USE: reports using substance 1 times per day and has 2-3  cans of  diet coke at a time.   Patient reports heaviest use is current use.   Nicotine  never used     REPORTS SUBSTANCE USE: N/A   Other substances not listed above:  Identify:  never used     REPORTS SUBSTANCE USE: N/A     Patient reported the following problems as a result of their substance use: no problems, not applicable.    Substance Use: No symptoms    Based on the negative CAGE score and clinical interview there  are not indications of drug or alcohol abuse.      Significant Losses / Trauma / Abuse / Neglect Issues:   Patient did not  serve in the .  There are indications or report of significant loss, trauma, abuse or neglect issues related to: are no indications and client denies any losses, trauma, abuse, or neglect concerns. Client's wife was diagnosed with breast cancer 2 months ago; she had pulmonary embolisms after this (she has had procedures and been on medications; she had a DVT in her leg) - there have been multiple medical issues and this has been very stressful  Concerns for possible neglect are not present.     Safety Assessment:   Patient denies current homicidal ideation and behaviors.  Patient denies current self-injurious ideation and behaviors.    Patient denied risk behaviors associated with substance use.  Patient denies any high risk behaviors associated with mental health symptoms.  Patient reports the following current concerns for their personal safety: None.  Patient reports there are not firearms in the house.         History of Safety Concerns:  Patient denied a history of homicidal ideation.     Patient denied a history of personal safety concerns.    Patient denied a history of assaultive behaviors.    Patient denied a history of sexual assault behaviors.     Patient denied a history of risk behaviors associated with substance use.  Patient denies any history of high risk behaviors associated with mental health symptoms.  Patient reports the following protective factors:  other    Risk Plan:  See Recommendations for Safety and Risk Management Plan    Review of Symptoms per patient report:   Depression: Lack of interest, Excessive or inappropriate guilt, Change in energy level and Psychomotor slowing or agitation  Vicenta:  No Symptoms  Psychosis: No Symptoms  Anxiety: Excessive worry, Nervousness and Psychomotor agitation  Panic:  No symptoms  Post Traumatic Stress Disorder:  No Symptoms   Eating Disorder: No Symptoms  ADD / ADHD:  Inattentive, Poor task completion, Poor organizational skills and Distractibility  Conduct Disorder: No symptoms  Autism Spectrum Disorder: No symptoms  Obsessive Compulsive Disorder: No Symptoms    Patient reports the following compulsive behaviors and treatment history: none reported.      Diagnostic Criteria:   Adjustment Disorder  A. The development of emotional or behavioral symptoms in response to an identifiable stressor(s) occurring within 3 months of the onset of the stressor(s)  B. These symptoms or behaviors are clinically significant, as evidenced by one or both of the following:       - Significant impairment in social, occupational, or other important areas of functioning  C. The stress-related disturbance does not meet criteria for another disorder & is not not an exacerbation of another mental disorder  D. The symptoms do not represent normal bereavement  E. Once the stressor or its consequences have terminated, the symptoms do not persist for more than an additional 6 months       * Adjustment Disorder with Mixed Anxiety and Depressed Mood: The predominant manifestation is a combination of depression and anxiety    Functional Status:  Patient reports the following functional impairments:  home life with family and management of the household and or completion of tasks.         Clinical Summary:  1. Reason for assessment: ADHD Evaluation  .  2. Psychosocial, Cultural and Contextual Factors: wife's recent medical issues; sons with MH  issues.  3. Principal DSM5 Diagnoses  (Sustained by DSM5 Criteria Listed Above):   Adjustment Disorders  309.28 (F43.23) With mixed anxiety and depressed mood.  RULE OUT: ADHD  6. Prognosis: Expect Improvement and Maintain Current Status / Prevent Deterioration.  7. Likely consequences of symptoms if not treated: issues at home/work.  8. Client strengths include:  caring, creative, educated, empathetic, employed, goal-focused, good listener, insightful, intelligent, motivated, open to learning, open to suggestions / feedback, responsible parent, support of family, friends and providers, supportive, wants to learn, willing to ask questions, willing to relate to others and work history .     Recommendations:     1. Plan for Safety and Risk Management:   Safety and Risk: Recommended that patient call 911 or go to the local ED should there be a change in any of these risk factors..          Report to child / adult protection services was NA.      4. Resources/Service Plan:    services are not indicated.   Modifications to assist communication are not indicated.   Additional disability accommodations are not indicated.      5. Collaboration:   Collaboration / coordination of treatment will be initiated with the following  support professionals: primary care physician.      6.  Referrals:   The following referral(s) will be initiated: TBD. Next Scheduled Appointment: NA.      A Release of Information has been obtained for the following: NA.     Emergency Contact  was not obtained.     7. ASIF:    ASIF:  Discussed the general effects of drugs and alcohol on health and well-being. Provider gave patient printed information about the  effects of chemical use on their health and well being. Recommendations:  NA .     8. Records:   These were reviewed at time of assessment.   Information in this assessment was obtained from the medical record and  provided by patient who is a good historian.    Patient will have open  access to their mental health medical record.    9.   Interactive Complexity: No      Provider Name/ Credentials:  Sarah Norton, PhD, LP  January 23, 2023

## 2023-01-23 NOTE — PROGRESS NOTES
Client Name: Ry Allen  MRN: [id number]  : 1965    Client completed the [place] Multiphasic Personality Inventory-2 (MMPI-2), a self-report personality inventory, as part of his evaluation. Validity scales indicate that the client responded in an open and consistent manner, resulting in a valid profile. The following results are likely to be an accurate reflection of client's current functioning. Client s responses suggest that he is reporting moderate levels of general emotional distress. Individuals with similar profiles experience depression with tension, anxiety, worry, intrusive thoughts, insecurity, and apprehensiveness. They may also experience vegetative symptoms of depression such as anhedonia, sleep disturbance, and loss of interest, energy and motivation. They may experience a sense of mental failure or decline and the depletion of energy needed to accomplish mental work. Thinking and problem-solving are experienced as effortful and as subject to going off course even when significant effort is made. Thinking may be viewed as impaired or unreliable; they may have a sense that  I can t seem to get my mind right.  They may seek to avoid other people, both individuals and groups, because they feel uneasy and awkward in such situations and because say they are happier being alone.

## 2023-01-31 ENCOUNTER — VIRTUAL VISIT (OUTPATIENT)
Dept: PSYCHOLOGY | Facility: CLINIC | Age: 58
End: 2023-01-31
Payer: COMMERCIAL

## 2023-01-31 DIAGNOSIS — F43.23 ADJUSTMENT DISORDER WITH MIXED ANXIETY AND DEPRESSED MOOD: Primary | ICD-10-CM

## 2023-01-31 PROCEDURE — 90834 PSYTX W PT 45 MINUTES: CPT | Mod: 95 | Performed by: PSYCHOLOGIST

## 2023-01-31 NOTE — PROGRESS NOTES
"Progress Note     Client Name:  Ry Allen Date: 1/31/2023     Service Type: Individual  Video Visit: Yes, the patient's condition can be safely assessed and treated via synchronous audio and visual telemedicine encounter.      Reason for Video Visit: Services only offered telehealth    Location of Originating Site (Patient): Patient's home        Distant Location (Provider):  Off-site     Session Start Time: 9:00  Session End Time: 9:49     Session Length: 49 minutes    Session #: 2     Attendees: Client attended alone     Intervention: reviewed strategies for managing symptoms of anxiety and depression; motivational interviewing: explored potential barriers for making healthy changes    Identifying Information:  Client is a 57 year old, ,  male. Client was referred for a diagnostic assessment by PCP.  The purpose of this evaluation is to: provide treatment recommendations and clarify diagnosis.  Client is currently  employed part time and reports he is able to function appropriately at work.. Client attended the session alone.       Client's Statement of Presenting Concern:  Client reported seeking services at this time for diagnostic assessment and recommendations for treatment. Client's presenting concerns include:  Client reported that is is hard for him to focus and concentrate. He is very forgetful (\"this could be that I'm getting older and things aren't working quite as well\"). He can be \"absent minded\" and this has always been the case, but it seems to be getting worse lately. He has been using more reminders on his phone. He schedules text-reminders for himself so that he doesn't forget to do certain things. He writes a lot of notes and has them all over his desk. He will be doing something and if he gets distracted, he will get off task and then forget to go back to the original task. He leaves tasks incomplete/unfinished. He is disorganized; his desk is messy and there are piles of things " "\"all over it.\" He explained that he has all of the items out, because if he puts it away somewhere it will be \"out of sight out of mind\" and he won't remember to get back to it. He does not misplace/lose things frequently. It is hard to listen to conversations in loud situations (e.g., he hears background noises and it is difficult to focus on what people are saying\"). He tries to listen to what is being said to him and it is very effortful to do so; by the time he processes what was said, the conversation has moved on. It is hard for him to keep up. People have to repeat themselves. He will hear what is being said but feel like he has to re-play all of the words in his mind so that they make sense (it takes a while for him to process what he hears). He doesn't read often but listens to books on tape (he doesn't have time to read books).  He may interrupt people at times (he has to say something or he will forget to say it later - \"I have to get it out\"). He is able to read for fun \"when it is casual and recreational\" (if he has the time and he isn't stressed out with other things going on). However, if he has to read for continuing education for his job, he will read through something and not know what he just read and he has to go back and re-read it. Client is usually always moving a bit (he can be restless). While sitting and watching a movie, his foot is always moving. He stated, \"I can sit, but there is always some part of my body that is moving and can't sit still.\" Client stated that symptoms have resulted in the following functional impairments: home life with family and management of the household and or completion of tasks.        History of Presenting Concern:  Client reported that he has not completed a previous ADHD diagnostic assessment.  Client has not received a previous diagnosis of ADHD. Client reported that medication has not been prescribed medication to address these problems. Client reported " "that these problems began in college. Client has not attempted to resolve these concerns in the past. Client is curious about whether medication might be helpful for him. Client reported that other professionals are not involved in providing support / services. Client is curious about whether medication might be helpful for him.      Social History:  As a child, client reported that he did not struggle with specific ADHD symptoms in childhood. Client reported difficulty with childhood peer relationships. He could get along with other kids, but he felt he didn't fit in, so it was hard to make friends. He always felt socially awkward and noted that \"social engagements were a little difficult for me.\" He felt school was so easy for him that he was the \"smart kid\" and he didn't like this attention. He wanted to \"fit in\" and be \"normal.\" He wanted to be like everybody else. He had a very small school of 10-12 kids in his class. He would go back to school in the fall after summer vacation and would worry that he wouldn't remember other kids' names. This was stressful for him. As a child, client reported having regular and consistent sleep patterns.  Client reported currently experiencing sleep disturbance, including: insomnia (due to stress with his kids and his wife's health issues). He feels stressed and anxious and this impacts sleep. Prior to his wife's breast cancer diagnosis, he was having issues with staying asleep (stress or getting up to go to the bathroom every couple of hours). It has been a little more challenging to fall asleep and stay asleep. He wakes up a few times each night.  Client reported sleeping approximately 6-7 hours per night (he might be in bed for 9 hours).  Client reported that he has not completed a sleep study.  Client reported having a well balanced diet.  There are not significant nutritional concerns. Client reported sporadic exercise patterns.    Client's highest education level was " "graduate school. Client graduated high school in 1983. He estimated he obtained mostly As. He noted that he has always been \"very intelligent\" and school was very easy for him (he would look at a list of spelling words once and \"had them down\"). He didn't have to learn study skills or concentrate. Other kids had to study and learn how to focus. During the elementary, middle, and high school years, patient recalls academic strengths in the area of math and science. Client reported experiencing academic problems in reading and writing (English). Client did not identify any learning problems. Client did not receive tutoring services during the school years. Client did not receive special education services. Client reported no particular problems during the school years. If they were able to work on assignments in class, he would do it right away so that he didn't have to take it home. If he had to bring assignments home, he would put it off and do it the night before it was due. Client would get distracted and side-tracked when he was working on homework at home. In his elementary, middle school, and high school years \"it felt easy.\" He could just listen in class and remember what teachers said (\"whatever they said, I got it and it was locked in because there wasn't that much information to retain\"). It was easier for him to focus in class and follow along. He would get reports from teachers in elementary school saying that he wasn't always paying attention in class.     Client did attend post-secondary school.  He graduated from college in 1989 from [employer] with a degree in physical education and a minor in mathematics. He did three years of college and then changed his major and had to \"re-route\" so it took him 6 years to complete his bachelor's degree. He started as a math major and wanted to be a teacher and , but by his third year he was getting into math classes that were too challenging. eh " "couldn't focus on this and he \"didn't care\" about high level math classes that he wouldn't utilize in his plan to teach high school students. He stated, \"In my head I was thinking that I didn't need to learn this.\" He got an F in one of the math classes because he fell behind and was overwhelmed and couldn't get caught up so he just stopped going. He was really enjoying his science classes (anatomy, kinesiology, physiology) and decided to switch majors). He reported that he had more trouble in college with completing homework. College required more independence and that he be self-driven. There were more reading assignments and this was difficult. Client was able to get to class on time. He had difficulty managing his time and he procrastinated and stayed up all night studying and finishing papers. Client had to learn study skills and learn how to focus on these tasks. Client would go to the library when he needed to. 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 Client completed graduate school. Client has a master's degree in physical therapy. He completed this program in 1991. Client reported that he did well in graduate school. He was very passionate about this area and liked learning about it. He graduated with a 3.5 GPA. He explained that PT school is very hard to get into (\"everybody in the class was very intelligent\"). His classmates would go to the library to study and they'd say \"they couldn't get much done because Ry was there.\" They gave him feedback that he was talkative and he would get up in 15 minutes. He couldn't stay focused and he was talkative and got others off task.    Client reported that he is currently employed part-time. He works as a physical therapist in rehab-long term care. He hasn't worked in a month now since caring for his wife with all of her surgeries. 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 Client has been working part-time since that time. Client reported that the current job is a good fit for his skills and personality. Client reported that he often been late in completing projects (in the past). Client \"floats\" and works part-time so he has to get documentation done the day he is there (\"I might not be back for months so I have to finish it right away\"). When he was working full-time, he wasn't able to complete documentation because he had to move onto the next patient. He would fall behind and had stacks of discharge paperwork that wasn't completed. Client reported that he has anxiety related to work. He has always had difficulty with remembering names and this is stressful for him. No matter how many times he has met people at certain facilities, he can't seem to remember their names. He feels too embarrassed to ask people to remind him of their names. Client feels that when he is there \"I'm kind of faking it; I don't know what I'm doing that well.\" He feels that \"I'm really not that good at what I'm doing there; kind of like imposter syndrome, but not that severe.\" Client is proud of his ability to motivate others (\"I'm a very good cheerleader and I can get the residents in the long-term care facilities to do things that other therapists weren't able to get them to do\"). Client feels that when he is at work, he is better able to keep himself \"focused enough\" that he can complete required tasks. He doesn't feel that he is making mistakes, but he probably isn't as thorough as other therapists are. He thinks he is doing \"what needs to be done\" but he feels he should be doing a bit more. He doesn't know what that would be. He feels stressed by the productivity expectations and the requirements " "continue to grow and increase. 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  The longest period of employment has been 15 years (part-time); his longest full-time job was 3 years (usually changed jobs because of life circumstances and having to move; not because of the job). Client has not been terminated from a place of employment.       Risk Taking Behaviors:  Client reported no history of risk taking behaviors      Motor Vehicle Operation:  Client has received a 's license.  Client has received moving violations, includin-2 speeding tickets.  Client reported the following driving habits: attentive and cautious. He might have moments where he is \"driving on auto \" without remembering where he was going (he might almost miss an exit if the is going somewhere new).  According to client, other people are comfortable riding as a passenger when he is driving.      Mental Status Assessment:  Appearance:   Appropriate   Eye Contact:   Good   Psychomotor Behavior: Normal   Attitude:   Cooperative   Orientation:   All  Speech   Rate / Production: Normal    Volume:  Normal   Mood:    Normal  Affect:    Appropriate   Thought Content:  Clear   Thought Form:  Coherent  Logical   Insight:    Good       Review of Symptoms:  Depression:     Lack of interest, Excessive or inappropriate guilt, Change in energy level and Psychomotor slowing or agitation  Vicenta:             No Symptoms  Psychosis:       No Symptoms  Anxiety:           Excessive worry, Nervousness and Psychomotor agitation  Panic:              No symptoms  Post Traumatic Stress Disorder:  No Symptoms   Eating Disorder:          No Symptoms  ADD / ADHD:              Inattentive, Poor task completion, Poor organizational skills and Distractibility  Conduct Disorder:       No symptoms  Autism Spectrum Disorder:     No symptoms  Obsessive Compulsive Disorder:       No Symptoms  Reckless Behavior: No symptoms        Safety Issues and Plan " "for Safety and Risk Management:  Client has had a history of suicidal ideation: Client had SI when he was \"young\" (high school) -passive thoughts about not being around    Client denies current fears or concerns for personal safety.  Client denies current or recent suicidal ideation or behaviors.  Client denies current or recent homicidal ideation or behaviors.  Client denies current or recent self injurious behavior or ideation.  Client denies other safety concerns.  Client reports there are no firearms in the house.  Recommended that patient call 911 or go to the local ED should there be a change in any of these risk factors.        Diagnostic Criteria:  Attention Deficit Hyperactivity Disorder  A) A persistent pattern of inattention and/or hyperactivity-impulsivity that interferes with functioning or development, as characterized by (1) Inattention and/or (2) Hyperactivity and Impulsivity  - Often fails to give close attention to details or makes careless mistakes in schoolwork, at work, or during other activities  - Often has difficulty sustaining attention in tasks or play activities  - Often does not follow through on instructions and fails to finish schoolwork, chores, or duties in the workplace  - Often has difficulty organizing tasks and activities  - Is often easily distractedby extraneous stimuli  - Is often forgetful in daily activities  - Often fidgets with or taps hands or feet or squirms in seat    Adjustment Disorder  A. The development of emotional or behavioral symptoms in response to an identifiable stressor(s) occurring within 3 months of the onset of the stressor(s)  B. These symptoms or behaviors are clinically significant, as evidenced by one or both of the following:       - Significant impairment in social, occupational, or other important areas of functioning  C. The stress-related disturbance does not meet criteria for another disorder & is not not an exacerbation of another mental " disorder  D. The symptoms do not represent normal bereavement  E. Once the stressor or its consequences have terminated, the symptoms do not persist for more than an additional 6 months       * Adjustment Disorder with Mixed Anxiety and Depressed Mood: The predominant manifestation is a combination of depression and anxiety    Functional Status:  Client's symptoms have caused reduced functional status in the following areas: home life with family and management of the household and or completion of tasks.      DSM-5Diagnoses: (Sustained by DSM5 Criteria Listed Above)    Adjustment Disorders  309.28 (F43.23) With mixed anxiety and depressed mood    RULE OUT: ADHD    Attendance Agreement:  Client has signed Attendance Agreement:No: unable to sign via telehealth      Preliminary Plan:  The client reports no currently identified Church, ethnic or cultural issues relevant to therapy.     services are not indicated.    Modifications to assist communication are not indicated.    Collaboration / coordination of treatment will be initiated with the following support professionals: primary care physician.    Referral to another professional/service is not indicated at this time..    A Release of Information is not needed at this time.    Client was given self and collaborative rating scales to be completed prior to the next appointment.  Client consented to sending/receiving these measures via email.  Depression and anxiety rating scales were completed.  A third appointment was not scheduled at this time.     Report to child / adult protection services was NA.    Patient will have open access to their mental health medical record.    Sarah Norton, PhD, LP  January 31, 2023

## 2023-02-20 ENCOUNTER — DOCUMENTATION ONLY (OUTPATIENT)
Dept: PSYCHOLOGY | Facility: CLINIC | Age: 58
End: 2023-02-20
Payer: COMMERCIAL

## 2023-02-20 NOTE — PROGRESS NOTES
"Client Name: Castillo Allen  MRN: [id number]  : 1965       F[place] Adult ADHD Rating Scale-IV: Self and Other Reports (BAARS-IV)  The BAARS-IV assesses for symptoms of ADHD that are experienced in one's daily life. This assessment measure includes self and collateral rating scales designed to provide information regarding current and childhood symptoms of ADHD including inattention, hyperactivity, and impulsivity. Self-report scores are reported as percentiles. Scores at the 76th-83rd percentile are considered marginal, scores at the 84th-92nd percentile are considered borderline, scores at the 93rd-95th percentile are considered mild, scores at the 96th-98th percentile are considered moderate, and those at the 99th percentile are considered severe. Collateral or \"other\" rating scales are reported as number of symptoms observed in comparison to those reported by the client. Norms and percentile scores are not available for collateral reports.      Current Symptoms Scale--Self Report:   Client completed the self-report inventory of current symptoms. The results indicate that the client's Total ADHD Score was 37 which places them in the 94th percentile for overall ADHD symptoms. In addition, the client endorsed the following occur \"often\" or \"very often\": 4/9 (97th percentile) Inattention symptoms, 0/9 (1-75th percentile) Hyperactivity/Impulsivity symptoms, and 3/9 (90th percentile) Sluggish Cognitive Tempo symptoms. Overall, the results suggest the client is reporting moderate symptoms of inattention at this time.      Current Symptoms Scale--Other Report:  Client's wife completed the collateral report inventory of current symptoms. Based on the collateral contact's observation of symptoms, the client demonstrates the following \"often\" or \"very often\": 3/9 Inattention symptoms, 1/5 Hyperactivity symptoms, 0/4 Impulsivity symptoms, and 0/9 Sluggish Cognitive Tempo symptoms. The client's Total ADHD Score " "was 32. The collateral- and self-report scores are similar and suggest Client experiences symptoms of inattention.     Childhood Symptoms Scale--Self-Report:  Client completed the self-report inventory of childhood symptoms. The results indicate that the client's Total ADHD Score was 29 which places them in the 51-75th percentile for overall ADHD symptoms in childhood. In addition, the client endorsed having experienced the following \"often\" or \"very often\": 1/9 (84th percentile) Inattention symptoms and 0/9 (1-75th percentile) Hyperactivity-Impulsivity symptoms. Overall, the results suggest the client did not report symptoms of ADHD in childhood.     Childhood Symptoms Scale--Other Report:  Client was unable to find someone to complete the collateral report for childhood.     Fidel[place] Functional Impairment Scale: Self and Other Reports (BFIS)  The BFIS is used to assess an individuals' psychosocial impairment in major life/daily activities that may be due to a mental health disorder. This assessment measure includes self and collateral rating scales. Self-report scores are reported as percentiles. Scores at the 76th-83rd percentile are considered marginal, scores at the 84th-92nd percentile are considered borderline, scores at the 93rd-95th percentile are considered mild, scores at the 96th-98th percentile are considered moderate, and those at the 99th percentile are considered severe. Collateral or \"other\" rating scales are reported as number of symptoms observed in comparison to those reported by the client. Norms and percentile scores are not available for collateral reports.      Results indicate the client identified impairment (scores at or greater than 93rd percentile) in the following areas: home-chores, daily responsibilities and childrearing. The client's Mean Impairment Score was 3.71 (75-84th percentile) indicating the client is not reporting impairment in functioning across domains. Client's wife " "completed the collateral rating scale, which indicated similar results (e.g., Mean Impairment Score of 2.54). She noted impairment in the area of: work.        [place] Deficits in Executive Functioning Scale (BDEFS)  The BDEFS is a measure used for evaluating dimensions of adult executive functioning in daily life. This assessment measure includes self and collateral rating scales. Self-report scores are reported as percentiles. Scores at the 76th-83rd percentile are considered marginal, scores at the 84th-92nd percentile are considered borderline, scores at the 93rd-95th percentile are considered mild, scores at the 96th-98th percentile are considered moderate, and those at the 99th percentile are considered severe. Collateral or \"other\" rating scales are reported as number of symptoms observed in comparison to those reported by the client. Norms and percentile scores are not available for collateral reports.      Results indicate the client's Total Executive Functioning Score was 163 (84th percentile). The ADHD-Executive Functioning Index score was 21 (86th percentile). These scores suggest the client is reporting borderline deficits in executive functioning. They did not note deficits in any domains. Client s wife completed the collateral report which indicated somewhat discrepant results. She noted a deficit in self-management to time.     Generalized Anxiety Disorder Questionnaire (BVEERLY-7)  This questionnaire is designed to screen for anxiety in adults. Based on the client's score of 5, they are reporting mild symptoms of anxiety at this time. They endorsed the following symptoms of anxiety: feeling nervous/anxious/on edge; not being able to stop or control worrying; trouble relaxing, restlessness and becoming easily annoyed/irritable.     Patient Health Questionnaire- 9 (PHQ-9)   This questionnaire is designed to screen for depression in adults. Based on the client's score of 3, they are not reporting symptoms " of depression at this time.

## 2023-02-21 NOTE — PROGRESS NOTES
CNS Vital Signs Neurocognitive Battery  The CNS Vital Signs Neurocognitive Battery is a remotely-administered assessment comprised of seven core subtests to individually measure the patient's verbal memory, visual memory, motor speed, psychomotor speed, reaction time, focus, ability to sustain attention and ability to adapt to changing rules and tasks.      Above average domain scores indicate a standard score (SS) greater than 109 or a Percentile Rank ([place]) greater than 74, indicating a high functioning test subject. Average is a SS  or [place] 25-74, indicating normal function. Low Average is a SS 80-89 or [place] 9-24 indicating a slight deficit or impairment. Below Average is a SS 70-79 or [place] 2-8, indicating a moderate level of deficit or impairment. Very Low is a SS less than 70 or a [place] less than 2, indicating a deficit and impairment.  Validity Indicator denotes a guideline for representing the possibility of an invalid test or domain score, and can be influenced by patient understanding, effort, or other conditions.    The patient s results are detailed below:    Domain Standard Score Percentile Description Validity   Neurocognitive Index 119 90 Above Average Y   Composite Memory Measure 128 97 Above Average Y   Verbal Memory 112 79 Above Average Y   Visual Memory 131 98 Above Average Y   Psychomotor Speed 116 86 Above Average Y   Reaction Time 117 87 Above Average  Y   Complex Attention 115 84 Above Average Y   Cognitive Flexibility 121 92 Above Average Y   Processing Speed 113 81 Above Average Y   Executive Function 121 92 Above Average Y   Simple Attention 92 30 Average Y   Motor Speed 114 82 Above Average Y     Neurocognitive Index (NCI): Measures an average score derived from the domain scores or a general assessment of the overall neurocognitive status of the patient. The patient s NCI score is 119, with a percentile of 90, and falls within the Above Average range.    Composite Memory: Measures how well  subject can recognize, remember, and retrieve words and geometric figures, and is comprised of the Visual and Verbal Memory domains. The patient s Composite Memory score is 128, with a percentile of 97, and falls within the Above Average range.    Verbal Memory: Measures how well subject can recognize, remember, and retrieve words. The patient s Verbal Memory score is 112, with a percentile of 79, and falls within the Above Average range.    Visual Memory: Measures how well subject can recognize, remember and retrieve geometric figures. The patient s Visual Memory score is 131, with a percentile of 98, and falls within the Above Average range.    Psychomotor Speed: Measures how well a subject perceives, attends, responds to complex visual-perceptual information and performs simple fine motor coordination, and is comprised of the Motor Speed and Processing Speed indexes. The patient s Psychomotor Speed score is 116, with a percentile of 86, and falls within the Above Average range.    Reaction Time: Measures how quickly the subject can react, in milliseconds, to a simple and increasingly complex direction set. The patient s Reaction Time score is 117, with a percentile of 87, and falls within the Above Average range.    Complex Attention: Measures the ability to track and respond to a variety of stimuli over lengthy periods of time and/or perform complex mental tasks requiring vigilance quickly and accurately. The patient s Complex Attention score is 115, with a percentile of 84, and falls within the Above Average range.    Cognitive Flexibility: Measures how well subject is able to adapt to rapidly changing and increasingly complex set of directions and/or to manipulate the information. The patient s Cognitive Flexibility score is 121, with a percentile of 92, and falls within the Above Average range.    Processing Speed: Measures how well a subject recognizes and processes information i.e., perceiving,  attending/responding to incoming information, motor speed, fine motor coordination, and visual-perceptual ability. The patient s Processing Speed score is 113, with a percentile of 81, and falls within the Above Average range.    Executive Function: Measures how well a subject recognizes rules, categories, and manages or navigates rapid decision making. The patient s Executive Function score is 121, with a percentile of 92, and falls within the Above Average range.    Simple Attention: Measures the ability to track and respond to a single defined stimulus over lengthy periods of time while performing vigilance and response inhibition quickly and accurately to a simple task. The patient s Simple Attention score is 92, with a percentile of 30, and falls within the Average range.    Motor Speed: Measure: Ability to perform simple movements to produce and satisfy an intention towards a manual action and goal. The patient s Motor Speed score is 114, with a percentile of 82, and falls within the Above Average range.

## 2023-02-22 ENCOUNTER — DOCUMENTATION ONLY (OUTPATIENT)
Dept: PSYCHOLOGY | Facility: CLINIC | Age: 58
End: 2023-02-22
Payer: COMMERCIAL

## 2023-02-22 NOTE — PROGRESS NOTES
[facility]   ADHD Evaluation   ?   Patient: Ry Allen     YOB: 1965  MRN: [id number]     Date(s) of assessment: Diagnostic Assessment (1/92323, 1/31/23), F[place] self-report and collateral measures scored and interpreted (2/20/23), MMPI -2 (administered on 1/23/23, interpreted on 1/23/23); CNS Vital Signs (administered 2/20/23, interpreted on 2/20/23)  ?   Information about appointment:   Client attended two sessions to aid in determining client's mental health diagnosis or diagnoses and treatment recommendations that best address client concerns. Available medical records were reviewed. There were no previous psychological evaluations for review. A diagnostic assessment was conducted at the initial appointment. Client completed several rating scales to assist in assessing attention-related and other mental health symptoms that may be causing impairments in functioning. Rating scales were also completed by a collateral contact. Client also completed personality testing to aid in diagnostic clarification.       Assessment tools:    [place] Adult ADHD Rating Scale-IV: Self and Other Reports (BAARS-IV), [place] Functional Impairment Scale: Self and Other Reports (BFIS), [place] Deficits in Executive Functioning Scale: Self and Other Reports (BDEFS), Patient Health Questionnaire-9 (PHQ-9), Generalized Anxiety Disorder-7 (BEVERLY-7) and [place] Multiphasic Personality Inventory (MMPI-2); and CNS Vital Signs *Testing administered remotely    ?   Assessment Results:   ?   Behavioral Observations:   Client arrived on time to each session. He was pleasant and cooperative at all times. He did not demonstrate difficulties with inattention or hyperactivity/impulsivity during sessions. The following results are likely to be an accurate reflection of Client's current functioning.       [place] Adult ADHD Rating Scale-IV: Self and Other Reports (BAARS-IV)  The BAARS-IV assesses for symptoms of ADHD  "that are experienced in one's daily life. This assessment measure includes self and collateral rating scales designed to provide information regarding current and childhood symptoms of ADHD including inattention, hyperactivity, and impulsivity. Self-report scores are reported as percentiles. Scores at the 76th-83rd percentile are considered marginal, scores at the 84th-92nd percentile are considered borderline, scores at the 93rd-95th percentile are considered mild, scores at the 96th-98th percentile are considered moderate, and those at the 99th percentile are considered severe. Collateral or \"other\" rating scales are reported as number of symptoms observed in comparison to those reported by the client. Norms and percentile scores are not available for collateral reports.      Current Symptoms Scale--Self Report:   Client completed the self-report inventory of current symptoms. The results indicate that the client's Total ADHD Score was 37 which places them in the 94th percentile for overall ADHD symptoms. In addition, the client endorsed the following occur \"often\" or \"very often\": 4/9 (97th percentile) Inattention symptoms, 0/9 (1-75th percentile) Hyperactivity/Impulsivity symptoms, and 3/9 (90th percentile) Sluggish Cognitive Tempo symptoms. Overall, the results suggest the client is reporting moderate symptoms of inattention at this time.      Current Symptoms Scale--Other Report:  Client's wife completed the collateral report inventory of current symptoms. Based on the collateral contact's observation of symptoms, the client demonstrates the following \"often\" or \"very often\": 3/9 Inattention symptoms, 1/5 Hyperactivity symptoms, 0/4 Impulsivity symptoms, and 0/9 Sluggish Cognitive Tempo symptoms. The client's Total ADHD Score was 32. The collateral- and self-report scores are similar and suggest Client experiences symptoms of inattention.      Childhood Symptoms Scale--Self-Report:  Client completed the " "self-report inventory of childhood symptoms. The results indicate that the client's Total ADHD Score was 29 which places them in the 51-75th percentile for overall ADHD symptoms in childhood. In addition, the client endorsed having experienced the following \"often\" or \"very often\": 1/9 (84th percentile) Inattention symptoms and 0/9 (1-75th percentile) Hyperactivity-Impulsivity symptoms. Overall, the results suggest the client did not report symptoms of ADHD in childhood.     Childhood Symptoms Scale--Other Report:  Client was unable to find someone to complete the collateral report for childhood.     [place] Functional Impairment Scale: Self and Other Reports (BFIS)  The BFIS is used to assess an individuals' psychosocial impairment in major life/daily activities that may be due to a mental health disorder. This assessment measure includes self and collateral rating scales. Self-report scores are reported as percentiles. Scores at the 76th-83rd percentile are considered marginal, scores at the 84th-92nd percentile are considered borderline, scores at the 93rd-95th percentile are considered mild, scores at the 96th-98th percentile are considered moderate, and those at the 99th percentile are considered severe. Collateral or \"other\" rating scales are reported as number of symptoms observed in comparison to those reported by the client. Norms and percentile scores are not available for collateral reports.      Results indicate the client identified impairment (scores at or greater than 93rd percentile) in the following areas: home-chores, daily responsibilities and childrearing. The client's Mean Impairment Score was 3.71 (75-84th percentile) indicating the client is not reporting impairment in functioning across domains. Client's wife completed the collateral rating scale, which indicated similar results (e.g., Mean Impairment Score of 2.54). She noted impairment in the area of: work.        [place] Deficits in " "Executive Functioning Scale (BDEFS)  The BDEFS is a measure used for evaluating dimensions of adult executive functioning in daily life. This assessment measure includes self and collateral rating scales. Self-report scores are reported as percentiles. Scores at the 76th-83rd percentile are considered marginal, scores at the 84th-92nd percentile are considered borderline, scores at the 93rd-95th percentile are considered mild, scores at the 96th-98th percentile are considered moderate, and those at the 99th percentile are considered severe. Collateral or \"other\" rating scales are reported as number of symptoms observed in comparison to those reported by the client. Norms and percentile scores are not available for collateral reports.      Results indicate the client's Total Executive Functioning Score was 163 (84th percentile). The ADHD-Executive Functioning Index score was 21 (86th percentile). These scores suggest the client is reporting borderline deficits in executive functioning. They did not note deficits in any domains. Client s wife completed the collateral report which indicated somewhat discrepant results. She noted a deficit in self-management to time.      CNS Vital Signs Neurocognitive Battery  The CNS Vital Signs Neurocognitive Battery is a remotely-administered assessment comprised of seven core subtests to individually measure the patient's verbal memory, visual memory, motor speed, psychomotor speed, reaction time, focus, ability to sustain attention and ability to adapt to changing rules and tasks.       Above average domain scores indicate a standard score (SS) greater than 109 or a Percentile Rank (NE) greater than 74, indicating a high functioning test subject. Average is a SS  or NE 25-74, indicating normal function. Low Average is a SS 80-89 or NE 9-24 indicating a slight deficit or impairment. Below Average is a SS 70-79 or NE 2-8, indicating a moderate level of deficit or impairment. " Very Low is a SS less than 70 or a LA less than 2, indicating a deficit and impairment.  Validity Indicator denotes a guideline for representing the possibility of an invalid test or domain score, and can be influenced by patient understanding, effort, or other conditions.    The patient s results are detailed below:     Domain Standard Score Percentile Description Validity   Neurocognitive Index 119 90 Above Average Y   Composite Memory Measure 128 97 Above Average Y   Verbal Memory 112 79 Above Average Y   Visual Memory 131 98 Above Average Y   Psychomotor Speed 116 86 Above Average Y   Reaction Time 117 87 Above Average  Y   Complex Attention 115 84 Above Average Y   Cognitive Flexibility 121 92 Above Average Y   Processing Speed 113 81 Above Average Y   Executive Function 121 92 Above Average Y   Simple Attention 92 30 Average Y   Motor Speed 114 82 Above Average Y      Neurocognitive Index (NCI): Measures an average score derived from the domain scores or a general assessment of the overall neurocognitive status of the patient. The patient s NCI score is 119, with a percentile of 90, and falls within the Above Average range.     Composite Memory: Measures how well subject can recognize, remember, and retrieve words and geometric figures, and is comprised of the Visual and Verbal Memory domains. The patient s Composite Memory score is 128, with a percentile of 97, and falls within the Above Average range.     Verbal Memory: Measures how well subject can recognize, remember, and retrieve words. The patient s Verbal Memory score is 112, with a percentile of 79, and falls within the Above Average range.     Visual Memory: Measures how well subject can recognize, remember and retrieve geometric figures. The patient s Visual Memory score is 131, with a percentile of 98, and falls within the Above Average range.     Psychomotor Speed: Measures how well a subject perceives, attends, responds to complex  visual-perceptual information and performs simple fine motor coordination, and is comprised of the Motor Speed and Processing Speed indexes. The patient s Psychomotor Speed score is 116, with a percentile of 86, and falls within the Above Average range.     Reaction Time: Measures how quickly the subject can react, in milliseconds, to a simple and increasingly complex direction set. The patient s Reaction Time score is 117, with a percentile of 87, and falls within the Above Average range.     Complex Attention: Measures the ability to track and respond to a variety of stimuli over lengthy periods of time and/or perform complex mental tasks requiring vigilance quickly and accurately. The patient s Complex Attention score is 115, with a percentile of 84, and falls within the Above Average range.     Cognitive Flexibility: Measures how well subject is able to adapt to rapidly changing and increasingly complex set of directions and/or to manipulate the information. The patient s Cognitive Flexibility score is 121, with a percentile of 92, and falls within the Above Average range.     Processing Speed: Measures how well a subject recognizes and processes information i.e., perceiving, attending/responding to incoming information, motor speed, fine motor coordination, and visual-perceptual ability. The patient s Processing Speed score is 113, with a percentile of 81, and falls within the Above Average range.     Executive Function: Measures how well a subject recognizes rules, categories, and manages or navigates rapid decision making. The patient s Executive Function score is 121, with a percentile of 92, and falls within the Above Average range.     Simple Attention: Measures the ability to track and respond to a single defined stimulus over lengthy periods of time while performing vigilance and response inhibition quickly and accurately to a simple task. The patient s Simple Attention score is 92, with a percentile of  30, and falls within the Average range.     Motor Speed: Measure: Ability to perform simple movements to produce and satisfy an intention towards a manual action and goal. The patient s Motor Speed score is 114, with a percentile of 82, and falls within the Above Average range.  ?   Summary of [place] Multiphasic Personality Inventory--Second Edition    Client completed the [place] Multiphasic Personality Inventory-2 (MMPI-2), a self-report personality inventory, as part of his evaluation. Validity scales indicate that the client responded in an open and consistent manner, resulting in a valid profile. The following results are likely to be an accurate reflection of client's current functioning. Client s responses suggest that he is reporting moderate levels of general emotional distress. Individuals with similar profiles experience depression with tension, anxiety, worry, intrusive thoughts, insecurity, and apprehensiveness. They may also experience vegetative symptoms of depression such as anhedonia, sleep disturbance, and loss of interest, energy and motivation. They may experience a sense of mental failure or decline and the depletion of energy needed to accomplish mental work. Thinking and problem-solving are experienced as effortful and as subject to going off course even when significant effort is made. Thinking may be viewed as impaired or unreliable; they may have a sense that  I can t seem to get my mind right.  They may seek to avoid other people, both individuals and groups, because they feel uneasy and awkward in such situations and because say they are happier being alone.     Generalized Anxiety Disorder Questionnaire (BEVERLY-7)  This questionnaire is designed to screen for anxiety in adults. Based on the client's score of 5, they are reporting mild symptoms of anxiety at this time. They endorsed the following symptoms of anxiety: feeling nervous/anxious/on edge; not being able to stop or control  "worrying; trouble relaxing, restlessness and becoming easily annoyed/irritable.     Patient Health Questionnaire- 9 (PHQ-9)   This questionnaire is designed to screen for depression in adults. Based on the client's score of 3, they are not reporting symptoms of depression at this time.      Summary (based on clinical interview, review of records, test results):   Client is a 57-year-old, ,  male. Client was referred for a diagnostic assessment by PCP.  The purpose of this evaluation is to: provide treatment recommendations and clarify diagnosis. Client's presenting concerns include:  Client reported that is is hard for him to focus and concentrate. He is very forgetful (\"this could be that I'm getting older and things aren't working quite as well\"). He can be \"absent minded\" and this has always been the case, but it seems to be getting worse lately. He has been using more reminders on his phone. He schedules text-reminders for himself so that he doesn't forget to do certain things. He writes a lot of notes and has them all over his desk. He will be doing something and if he gets distracted, he will get off task and then forget to go back to the original task. He leaves tasks incomplete/unfinished. He is disorganized; his desk is messy and there are piles of things \"all over it.\" He explained that he has all of the items out, because if he puts it away somewhere it will be \"out of sight out of mind\" and he won't remember to get back to it. He does not misplace/lose things frequently. It is hard to listen to conversations in loud situations (e.g., he hears background noises and it is difficult to focus on what people are saying\"). He tries to listen to what is being said to him and it is very effortful to do so; by the time he processes what was said, the conversation has moved on. It is hard for him to keep up. People have to repeat themselves. He will hear what is being said but feel like he has to " "re-play all of the words in his mind so that they make sense (it takes a while for him to process what he hears). He doesn't read often but listens to books on tape (he doesn't have time to read books).  He may interrupt people at times (he has to say something or he will forget to say it later - \"I have to get it out\"). He is able to read for fun \"when it is casual and recreational\" (if he has the time and he isn't stressed out with other things going on). However, if he has to read for continuing education for his job, he will read through something and not know what he just read and he has to go back and re-read it. Client is usually always moving a bit (he can be restless). While sitting and watching a movie, his foot is always moving. He stated, \"I can sit, but there is always some part of my body that is moving and can't sit still.\" Client stated that symptoms have resulted in the following functional impairments: home life with family and management of the household and or completion of tasks. Client reported that he has not completed a previous ADHD diagnostic assessment.  Client has not received a previous diagnosis of ADHD. Client reported that medication has not been prescribed medication to address these problems. Client reported that these problems began in college. Client has not attempted to resolve these concerns in the past. Client reported the following previous diagnoses which includes:  Anxiety Disorder and Depression (Adjustment Disorder).  Client reported symptoms began in middle school (depression). Client thinks that anxiety has \"always been there.\" Client explained that when he was younger, depression was \"a little more than situational.\" He stated, \"It wasn't talked about as much, so I didn't get treatment for it.\"  Client has received mental health services in the past: medication from PCP (Wellbutrin). He took it for a while but decided to stop taking it. Client believes depression has " "always been situational (\"I just need help getting through the situation and then when it passes, I feel better.\"  Psychiatric Hospitalizations: None. Client denies a history of civil commitment.  Client is not receiving other mental health services. Client is curious about whether medication might be helpful for him. He did not report a personal history of chemical dependence.    Client reported he grew up in [place], [place]. He was raised by his biological parents. His father passed when Client was in his 30s. Client was the third born of five children. He has two brothers and two sisters. Client reported that their childhood was \"different.\" He stated, \"We were kind of the nerdy family. We got along fine, but I never pictured us as the typical/traditional family.\" Client explained that \"everybody in my family is very intelligent, and that bothered me growing up.\" He went to a very small elementary school (8 kids in each grade) and he was always the smartest one in the class and he didn't like the attention \"and everyone expected me to know the answers and I just wanted to be like everybody else.\"  Client described their current relationships with family of origin as good. Client reported the following relationship history one marriage.  Client's current relationship status is  for 28 years. Client's wife has had a lot of medical issues (breast cancer, DVT, and pulmonary embolisms recently). Client has been caring for her. Client identified their sexual orientation as heterosexual.  Client reported having 3 child(zulay); sons ages 25, 23, and 20. Two of Client's sons have autism and anxiety and ADHD. Their youngest son has anxiety and social anxiety. Client identified spouse as part of their support system.  Client identified the quality of these relationships as good.      As a child, client reported that he did not struggle with specific ADHD symptoms in childhood. Client reported difficulty with childhood " "peer relationships. He could get along with other kids, but he felt he didn't fit in, so it was hard to make friends. He always felt socially awkward and noted that \"social engagements were a little difficult for me.\" He felt school was so easy for him that he was the \"smart kid\" and he didn't like this attention. He wanted to \"fit in\" and be \"normal.\" He wanted to be like everybody else. He had a very small school of 10-12 kids in his class. He would go back to school in the fall after summer vacation and would worry that he wouldn't remember other kids' names. This was stressful for him. As a child, client reported having regular and consistent sleep patterns.  Client reported currently experiencing sleep disturbance, including: insomnia (due to stress with his kids and his wife's health issues). He feels stressed and anxious and this impacts sleep. Prior to his wife's breast cancer diagnosis, he was having issues with staying asleep (stress or getting up to go to the bathroom every couple of hours). It has been a little more challenging to fall asleep and stay asleep. He wakes up a few times each night.  Client reported sleeping approximately 6-7 hours per night (he might be in bed for 9 hours). Client reported that he has not completed a sleep study.       Client's highest education level was graduate school. Client graduated high school in 1983. He estimated he obtained mostly As. He noted that he has always been \"very intelligent\" and school was very easy for him (he would look at a list of spelling words once and \"had them down\"). He didn't have to learn study skills or concentrate. Other kids had to study and learn how to focus. During the elementary, middle, and high school years, patient recalls academic strengths in the area of math and science. Client reported experiencing academic problems in reading and writing (English). Client did not identify any learning problems. Client did not receive tutoring " "services during the school years. Client did not receive special education services. Client reported no particular problems during the school years. If they were able to work on assignments in class, he would do it right away so that he didn't have to take it home. If he had to bring assignments home, he would put it off and do it the night before it was due. Client would get distracted and side-tracked when he was working on homework at home. In his elementary, middle school, and high school years \"it felt easy.\" He could just listen in class and remember what teachers said (\"whatever they said, I got it and it was locked in because there wasn't that much information to retain\"). It was easier for him to focus in class and follow along. He would get reports from teachers in elementary school saying that he wasn't always paying attention in class.      Client did attend post-secondary school.  He graduated from college in 1989 from [employer] with a degree in physical education and a minor in mathematics. He did three years of college and then changed his major and had to \"re-route\" so it took him 6 years to complete his bachelor's degree. He started as a math major and wanted to be a teacher and , but by his third year he was getting into math classes that were too challenging. eh couldn't focus on this and he \"didn't care\" about high level math classes that he wouldn't utilize in his plan to teach high school students. He stated, \"In my head I was thinking that I didn't need to learn this.\" He got an F in one of the math classes because he fell behind and was overwhelmed and couldn't get caught up so he just stopped going. He was really enjoying his science classes (anatomy, kinesiology, physiology) and decided to switch majors). He reported that he had more trouble in college with completing homework. College required more independence and that he be self-driven. There were more reading " "assignments and this was difficult. Client was able to get to class on time. He had difficulty managing his time and he procrastinated and stayed up all night studying and finishing papers. Client had to learn study skills and learn how to focus on these tasks. Client would go to the library when he needed to. 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 Client completed graduate school. Client has a master's degree in physical therapy. He completed this program in 1991. Client reported that he did well in graduate school. He was very passionate about this area and liked learning about it. He graduated with a 3.5 GPA. He explained that PT school is very hard to get into (\"everybody in the class was very intelligent\"). His classmates would go to the library to study and they'd say \"they couldn't get much done because Ry was there.\" They gave him feedback that he was talkative and he would get up in 15 minutes. He couldn't stay focused and he was talkative and got others off task.     Client reported that he is currently employed part-time. He works as a physical therapist in rehab-long term care. He hasn't worked in a month now since caring for his wife with all of her surgeries. 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 Client has been working part-time since that time. Client reported that the current job is a good fit for his skills and personality. Client reported that he often been late in completing projects (in the past). Client \"floats\" and works part-time so he has to get documentation done the day he is there (\"I might not be back for months so I have to finish it right away\"). When he was working full-time, he wasn't " "able to complete documentation because he had to move onto the next patient. He would fall behind and had stacks of discharge paperwork that wasn't completed. Client reported that he has anxiety related to work. He has always had difficulty with remembering names and this is stressful for him. No matter how many times he has met people at certain facilities, he can't seem to remember their names. He feels too embarrassed to ask people to remind him of their names. Client feels that when he is there \"I'm kind of faking it; I don't know what I'm doing that well.\" He feels that \"I'm really not that good at what I'm doing there; kind of like imposter syndrome, but not that severe.\" Client is proud of his ability to motivate others (\"I'm a very good cheerleader and I can get the residents in the long-term care facilities to do things that other therapists weren't able to get them to do\"). Client feels that when he is at work, he is better able to keep himself \"focused enough\" that he can complete required tasks. He doesn't feel that he is making mistakes, but he probably isn't as thorough as other therapists are. He thinks he is doing \"what needs to be done\" but he feels he should be doing a bit more. He doesn't know what that would be. He feels stressed by the productivity expectations and the requirements continue to grow and increase. 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  The longest period of employment has been 15 years (part-time); his longest full-time job was 3 years (usually changed jobs because of life circumstances and having to move; not because of the job). Client has not been terminated from a place of employment.      Results of testing were not indicative of ADHD. Rating scales suggest the Client is reporting symptoms of inattention at this time. However, childhood symptoms were not reported. Rather, Client explained \"everybody in my family is very intelligent, and that " "bothered me growing up.\" He went to a very small elementary school (8 kids in each grade) and he was always the smartest one in the class and he didn't like the attention \"and everyone expected me to know the answers and I just wanted to be like everybody else.\" Further, deficits in executive functioning and impairment in functioning was not reported. Client s responses on self-report measures suggest he experiences mild anxiety at this time. Personality testing was positive for anxiety. Client completed a brief virtual assessment examining brief core brain function domains. Results of this assessment demonstrated that, overall, Client s scores fell in the Above Average range. There were no deficits that would be expected if Client had ADHD.     Based on the results of clinical interview and psychological testing, the client currently meets criteria for a diagnosis of Adjustment Disorder with mixed anxiety and depressed mood. Indeed, there are significant stressors Client faces (e.g., sons  mental health needs, wife s current medical issues). Client will be provided with the results of testing, diagnosis, and recommendations in his last appointment.         DSM5 Diagnoses: (Sustained by DSM5 Criteria Listed Above)      Adjustment Disorder with mixed anxiety and depressed mood (F43.23)     Psychosocial & Contextual Factors: wife s medical issues; sons with MH issues   ?   Recommendations:      ?   1. Schedule a medication evaluation with your physician. Medications are often beneficial in treating anxiety and depression.   ?   2. Individual therapy is recommended. Therapies focused on identifying and challenging problematic thought and behavior patterns while increasing the use of healthy coping skills has been found to be effective in treating anxiety and depression. It will be important to set goals in this therapy and work actively toward achieving short-term successes that lead to the completion of each goal. " Action-oriented therapies, such as CBT and ACT (Acceptance and Commitment Therapy) are particularly recommended for the treatment of chronic anxiety and depression.    ?   3. The use of behavioral strategies such as diaphragmatic breathing, guided imagery, and mindfulness is often helpful in the management of anxiety symptoms.?      4. The following compensatory strategies may be useful to cope with reported inattention symptoms:   a. Maintaining a predictable routine and structured environment that incorporates prioritized checklists and reminders (e.g., Post-Its).  b. When completing tasks, try to focus on one task at a time and complete it in its entirety before moving on to the next task.  c. Minimize background distractions when working on complex tasks. For example, TV, radio or ongoing conversations in the background may hinder ability to focus on the task at hand.  d. Take regular breaks from tasks that require prolonged attention. In general, regular breaks from complex tasks can help prevent lapses in attention, which can result in errors.  e. Outline the steps required to complete a task prior to beginning it, which can help ensure an organized approach. Use the outline to refer to throughout the task as a reminder of the steps to be completed.     Sarah Norton, PhD, LP   Licensed Psychologist

## 2023-02-23 ENCOUNTER — MYC MEDICAL ADVICE (OUTPATIENT)
Dept: FAMILY MEDICINE | Facility: CLINIC | Age: 58
End: 2023-02-23
Payer: COMMERCIAL

## 2023-02-23 ENCOUNTER — VIRTUAL VISIT (OUTPATIENT)
Dept: PSYCHOLOGY | Facility: CLINIC | Age: 58
End: 2023-02-23
Payer: COMMERCIAL

## 2023-02-23 ENCOUNTER — NURSE TRIAGE (OUTPATIENT)
Dept: FAMILY MEDICINE | Facility: CLINIC | Age: 58
End: 2023-02-23
Payer: COMMERCIAL

## 2023-02-23 DIAGNOSIS — F43.23 ADJUSTMENT DISORDER WITH MIXED ANXIETY AND DEPRESSED MOOD: Primary | ICD-10-CM

## 2023-02-23 NOTE — TELEPHONE ENCOUNTER
"Refer to [employer] [facility] message. Called pt and reports not on any blood pressure meds now.    Wife recently diagnosed with Breast Cancer and has been checking her BP more often so pt has also been checking his as well. Denies being a smoker. Confirms that new diagnosis with wife can also be the cause of his high blood pressure.    Denies headaches or blurred vision with elevated blood pressure. No chest pain. Current blood pressure is 149/86 on right arm in sitting position @ 1720    Post physical stress this morning (shoveling snow) then sat for 10 min before taking his BP and it was 165/95 around noon today.    Pt scheduled to see PCP:    Next 5 appointments (look out 90 days)    Mar 02, 2023 10:30 AM  (Arrive by 10:10 AM)  Provider Visit with Nicolas Escobedo PA-C  [facility] ([facility] ) [address] [facility] [address] [phone]          Advised pt to reach back out if experiencing worsening symptoms.    Patient was given an opportunity to ask questions, verbalized understanding of plan, and is agreeable.    Aniket Peña RN on 2/23/2023 at 5:31 PM        Reason for Disposition    Systolic BP >= 130 OR Diastolic >= 80, and is not taking BP medications    Answer Assessment - Initial Assessment Questions  1. BLOOD PRESSURE: \"What is the blood pressure?\" \"Did you take at least two measurements 5 minutes apart?\"      149/86  2. ONSET: \"When did you take your blood pressure?\"      Several weeks have been taking BPs  3. HOW: \"How did you obtain the blood pressure?\" (e.g., visiting nurse, automatic home BP monitor)      automatic  4. HISTORY: \"Do you have a history of high blood pressure?\"      Has had some higher BPs in 160s but average is normally in the 140s  5. MEDICATIONS: \"Are you taking any medications for blood pressure?\" \"Have you missed any doses recently?\"      No meds currently  6. OTHER SYMPTOMS: \"Do you have any symptoms?\" (e.g., " "headache, chest pain, blurred vision, difficulty breathing, weakness)      No headache, no blurred vision,   7. PREGNANCY: \"Is there any chance you are pregnant?\" \"When was your last menstrual period?\"      no    Protocols used: BLOOD PRESSURE - HIGH-A-OH      "

## 2023-02-23 NOTE — PROGRESS NOTES
Client Name: Castillo Allen  Date: 2/23/23     Service Type: Individual (ADHD Evaluation feedback session)     Session Start Time: 12:00 Session End Time: 12:20      Session Length: 20 minutes      Session #: (feedback)     Attendees: Client attended alone    Telemedicine Visit: The patient's condition can be safely assessed and treated via synchronous audio and visual telemedicine encounter.      Reason for Telemedicine Visit: Services only offered telehealth    Originating Site (Patient Location): Patient's home        Distant Location (provider location):  Off-site    Consent:  The patient/guardian has verbally consented to: the potential risks and benefits of telemedicine (video visit) versus in person care; bill my insurance or make self-payment for services provided; and responsibility for payment of non-covered services.     Mode of Communication:  Video Conference via [employer]    As the provider I attest to compliance with applicable laws and regulations related to telemedicine.          DATA        Treatment Objective(s) Addressed in This Session:   Provided feedback on ADHD evaluation. Reviewed test results in depth and answered client's questions. Client is diagnosed with Adjustment Disorder with mixed anxiety and depressed mood. This provider also completed full written report of evaluation, including integration of testing data, summary, and recommendations. Please see Documentation Only dated 2/22/23.     Progress on / Status of Treatment Objective(s) / Homework:   Completed      Intervention:  ADHD Evaluation feedback; Reviewed report (can be found in Documentation Only encounter dated 2/22/23); Client was appreciative of the feedback and expressed understanding of the diagnoses.         ASSESSMENT: Current Emotional / Mental Status (status of significant symptoms):  Risk status (Self / Other harm or suicidal ideation)  Client denies current fears or concerns for personal safety.  Client denies  current or recent suicidal ideation or behaviors.  Client denies current or recent homicidal ideation or behaviors.  Client denies current or recent self-injurious behavior or ideation.  Client denies other safety concerns.  A safety and risk management plan has not been developed at this time, however client was given the after-hours number / 911 should there be a change in any of these risk factors.     Appearance: Appropriate   Eye Contact: Good  Psychomotor Behavior: Normal  Attitude: Cooperative   Orientation: All  Speech  Rate / Production: Normal   Volume: Normal   Mood: Normal  Affect: Appropriate   Thought Content: Clear   Thought Form: Coherent Logical   Insight: Good      Medication Review:  Client is not currently prescribed psychiatric medications    Medication Compliance:  NA     Changes in Health Issues:  None reported     Chemical Use Review:  Substance Use: Chemical use reviewed, no active concerns      Tobacco Use: No current tobacco use.      Collateral Reports Completed:  Routed note to Care Team Member(s)     PLAN: (Homework, other)     Recommendations:    1. Schedule a medication evaluation with your physician. Medications are often beneficial in treating anxiety and depression.   ?   2. Individual therapy is recommended. Therapies focused on identifying and challenging problematic thought and behavior patterns while increasing the use of healthy coping skills has been found to be effective in treating anxiety and depression. It will be important to set goals in this therapy and work actively toward achieving short-term successes that lead to the completion of each goal. Action-oriented therapies, such as CBT and ACT (Acceptance and Commitment Therapy) are particularly recommended for the treatment of chronic anxiety and depression.    ?   3. The use of behavioral strategies such as diaphragmatic breathing, guided imagery, and mindfulness is often helpful in the management of anxiety symptoms.?       4. The following compensatory strategies may be useful to cope with reported inattention symptoms:   a. Maintaining a predictable routine and structured environment that incorporates prioritized checklists and reminders (e.g., Post-Its).  b. When completing tasks, try to focus on one task at a time and complete it in its entirety before moving on to the next task.  c. Minimize background distractions when working on complex tasks. For example, TV, radio or ongoing conversations in the background may hinder ability to focus on the task at hand.  d. Take regular breaks from tasks that require prolonged attention. In general, regular breaks from complex tasks can help prevent lapses in attention, which can result in errors.  e. Outline the steps required to complete a task prior to beginning it, which can help ensure an organized approach. Use the outline to refer to throughout the task as a reminder of the steps to be completed.     Sarah Norton, PhD,    Licensed Psychologist         Psychological Testing   Billing/Services Summary       Testing Evaluation Services Base: 38608  (1st 60 mins) Add-on: 30763  (each addtl 60 mins)   Record Review and Clarify Referral Question   (8:40/9:00), (1/23/23) 20 minutes   Integration/Report Generation   (12:00/1:00), (1/23/23) - Fidelina Scales  (11:00/12:00), (2/20/23) - CNS Vital Signs  (11:00/12:00), (2/20/23) - MMPI-2  (3:00/4:00), (2/22/23) - Report 60 minutes  60 minutes  60 minutes  60 minutes   (12:00/12:20), (2/23/23) - Interactive Feedback session  20 minutes   Total Time: 280 minutes (4 hours, 40 minutes)    Total Units: 1 4           Diagnosis(es): (ICD-10)  Adjustment Disorder with mixed anxiety and depressed mood (F43.23)

## 2023-03-02 ENCOUNTER — OFFICE VISIT (OUTPATIENT)
Dept: FAMILY MEDICINE | Facility: CLINIC | Age: 58
End: 2023-03-02
Payer: COMMERCIAL

## 2023-03-02 VITALS
BODY MASS INDEX: 28.49 KG/M2 | WEIGHT: 188 LBS | TEMPERATURE: 97.8 F | SYSTOLIC BLOOD PRESSURE: 138 MMHG | HEIGHT: 68 IN | RESPIRATION RATE: 18 BRPM | OXYGEN SATURATION: 99 % | HEART RATE: 86 BPM | DIASTOLIC BLOOD PRESSURE: 74 MMHG

## 2023-03-02 DIAGNOSIS — F43.23 ADJUSTMENT DISORDER WITH MIXED ANXIETY AND DEPRESSED MOOD: ICD-10-CM

## 2023-03-02 DIAGNOSIS — R03.0 ELEVATED BLOOD PRESSURE READING WITHOUT DIAGNOSIS OF HYPERTENSION: Primary | ICD-10-CM

## 2023-03-02 DIAGNOSIS — R97.20 ELEVATED PROSTATE SPECIFIC ANTIGEN (PSA): ICD-10-CM

## 2023-03-02 DIAGNOSIS — R39.11 URINARY HESITANCY: ICD-10-CM

## 2023-03-02 LAB — PSA SERPL-MCNC: 6.11 NG/ML (ref 0–3.5)

## 2023-03-02 PROCEDURE — G0103 PSA SCREENING: HCPCS | Performed by: PHYSICIAN ASSISTANT

## 2023-03-02 PROCEDURE — 99214 OFFICE O/P EST MOD 30 MIN: CPT | Performed by: PHYSICIAN ASSISTANT

## 2023-03-02 PROCEDURE — 36415 COLL VENOUS BLD VENIPUNCTURE: CPT | Performed by: PHYSICIAN ASSISTANT

## 2023-03-02 ASSESSMENT — PAIN SCALES - GENERAL: PAINLEVEL: NO PAIN (0)

## 2023-03-02 NOTE — PROGRESS NOTES
"  Assessment & Plan     Elevated blood pressure reading without diagnosis of hypertension  Improved today. Question some weight gain, lower activity. There is also a family hx. For now, we'll use the next three months to make changes; if still getting elevation can consider rx at that point    Adjustment disorder with mixed anxiety and depressed mood  Overall he is doing well. Did not think much benefit to attention with wellbutrin, and not needing for mood at this time    Elevated prostate specific antigen (PSA)  Urinary hesitancy  He mentions some increasing urinary changes. Hx of subtle elevation previously. Trending today. Further plan per results. Discussed differences between low/high grade CA and how we determine those diagnoses, if he were to meet with urology. He has tried flomax previously but cannot recall results  - PSA, screen; Future  - PSA, screen       BMI:   Estimated body mass index is 28.59 kg/m  as calculated from the following:    Height as of this encounter: 1.727 m (5' 8\").    Weight as of this encounter: 85.3 kg (188 lb).         Return in about 3 months (around 6/2/2023) for BP Recheck.    Nicolas Escobedo PA-C  [facility] AMY Raza is a 58 year old, presenting for the following health issues:  Hypertension      History of Present Illness       Reason for visit:  High blood pressure  Symptom onset:  3-4 weeks ago  Symptoms include:  High blood pressure  Symptom intensity:  Moderate  Symptom progression:  Staying the same  Had these symptoms before:  No  What makes it worse:  No  What makes it better:  No    He eats 0-1 servings of fruits and vegetables daily.He consumes 0 sweetened beverage(s) daily.He exercises with enough effort to increase his heart rate 9 or less minutes per day.  He exercises with enough effort to increase his heart rate 3 or less days per week.   He is taking medications regularly.     Castillo Allen is a 58 year old male who " "presents today to discuss a few things  Wife with Breast cancer dx  Mentally he feels he has been stable despite this    He is OFF medication (wellbutrin)   Was tested for ADHD; no diagnosis      Patient states that he has been checking his BP at home and it does fluctuate.   Highest was  170/100.   He is mostly asymptomatic when elevated  Not always elevated  There is a family hx    Patient states that he has noted some slight increase in urinary frequency and additionally experiencing a lower flow  Some double voiding    Review of Systems   Constitutional, HEENT, cardiovascular, pulmonary, gi and gu systems are negative, except as otherwise noted.      Objective    /74 (BP Location: Right arm, Patient Position: Sitting, Cuff Size: Adult Regular)   Pulse 86   Temp 97.8  F (36.6  C) (Tympanic)   Resp 18   Ht 1.727 m (5' 8\")   Wt 85.3 kg (188 lb)   SpO2 99%   BMI 28.59 kg/m    Body mass index is 28.59 kg/m .  Physical Exam   GENERAL: healthy, alert and no distress  EYES: Eyes grossly normal to inspection, PERRL and conjunctivae and sclerae normal  RESP: lungs clear to auscultation - no rales, rhonchi or wheezes  CV: regular rate and rhythm, normal S1 S2, no S3 or S4, no murmur, click or rub, no peripheral edema and peripheral pulses strong  ABDOMEN: soft, nontender, no hepatosplenomegaly, no masses and bowel sounds normal  PSYCH: mentation appears normal, affect normal/bright                    "

## 2023-03-21 ENCOUNTER — OFFICE VISIT (OUTPATIENT)
Dept: UROLOGY | Facility: CLINIC | Age: 58
End: 2023-03-21
Attending: PHYSICIAN ASSISTANT
Payer: COMMERCIAL

## 2023-03-21 VITALS
BODY MASS INDEX: 27.58 KG/M2 | WEIGHT: 182 LBS | HEIGHT: 68 IN | SYSTOLIC BLOOD PRESSURE: 141 MMHG | HEART RATE: 81 BPM | DIASTOLIC BLOOD PRESSURE: 87 MMHG

## 2023-03-21 DIAGNOSIS — N40.0 ENLARGED PROSTATE: Primary | ICD-10-CM

## 2023-03-21 DIAGNOSIS — R97.20 ELEVATED PROSTATE SPECIFIC ANTIGEN (PSA): ICD-10-CM

## 2023-03-21 LAB
ALBUMIN UR-MCNC: NEGATIVE MG/DL
APPEARANCE UR: CLEAR
BILIRUB UR QL STRIP: NEGATIVE
COLOR UR AUTO: YELLOW
GLUCOSE UR STRIP-MCNC: NEGATIVE MG/DL
HGB UR QL STRIP: NEGATIVE
KETONES UR STRIP-MCNC: NEGATIVE MG/DL
LEUKOCYTE ESTERASE UR QL STRIP: NEGATIVE
NITRATE UR QL: NEGATIVE
PH UR STRIP: 6 [PH] (ref 5–7)
RESIDUAL VOLUME (RV) (EXTERNAL): 60
SP GR UR STRIP: 1.02 (ref 1–1.03)
UROBILINOGEN UR STRIP-ACNC: 0.2 E.U./DL

## 2023-03-21 PROCEDURE — 81003 URINALYSIS AUTO W/O SCOPE: CPT | Mod: QW | Performed by: UROLOGY

## 2023-03-21 PROCEDURE — 51798 US URINE CAPACITY MEASURE: CPT | Performed by: UROLOGY

## 2023-03-21 PROCEDURE — 99204 OFFICE O/P NEW MOD 45 MIN: CPT | Mod: 25 | Performed by: UROLOGY

## 2023-03-21 ASSESSMENT — PAIN SCALES - GENERAL: PAINLEVEL: NO PAIN (0)

## 2023-03-21 NOTE — LETTER
3/21/2023       RE: Castillo Allen  [address]  [place] [address]     Dear Colleague,    Thank you for referring your patient, Castillo Allen, to the [facility] [place] at [facility]. Please see a copy of my visit note below.    [facility]  Main Office: [address]  [address]  [place] [address]       CHIEF COMPLAINT:  Elevated PSA    HISTORY:   I was asked by Toby Escobedo PA-C to see this healthy 30-year-old gentleman who presents with an elevated PSA.  The PSA was first elevated in  5.07. After that it stabilized 6.  This year is up further at 6.11.  He has no family history of prostate cancer but his father did have colon cancer.  He says that it takes him a long time to empty his bladder but that is his only urinary symptom.  He has nocturia 0-1 time nightly.  Overall the slow bladder emptying is not bothersome to him.  He thinks he may have tried a medication for this once for short period of time but that it did not help.  No history of gross hematuria or infections.      PAST MEDICAL HISTORY:   Past Medical History:   Diagnosis Date     Allergic rhinitis, cause unspecified      Depressive disorder     Situational at times     Hypertension 2020       PAST SURGICAL HISTORY:   Past Surgical History:   Procedure Laterality Date     VASECTOMY       VASECTOMY         FAMILY HISTORY:   Family History   Problem Relation Age of Onset     Diabetes Mother      Thyroid Disease Mother      Hypertension Mother      Depression Mother      Cancer - colorectal Father          at about age 74--dx at around age 50     Hypertension Father      Colon Cancer Father      Diabetes Maternal Grandmother      Cerebrovascular Disease Maternal Grandfather      Diabetes Brother         44     Thyroid Disease Brother      Anxiety Disorder Brother      Obesity Brother      Thyroid Disease Brother      Anxiety Disorder Brother   "    Thyroid Disease Sister      Depression Sister      Bipolar Disorder Sister      Depression Sister      Anxiety Disorder Son      Depression Son      Autism Spectrum Disorder Son      Attention Deficit Disorder Son      Anxiety Disorder Son      Depression Son      Autism Spectrum Disorder Son      Attention Deficit Disorder Son        SOCIAL HISTORY:   Social History     Tobacco Use     Smoking status: Never     Passive exposure: Never     Smokeless tobacco: Never   Substance Use Topics     Alcohol use: Yes     Alcohol/week: 1.0 standard drink          Allergies   Allergen Reactions     Seasonal Allergies        No current outpatient medications on file.    Review Of Systems:  Skin: No rash, pruritis, or skin pigmentation  Eyes: No changes in vision  Ears/Nose/Throat: No changes in hearing, no nosebleeds  Respiratory: No shortness of breath, dyspnea on exertion, cough, or hemoptysis  Cardiovascular: No chest pain or palpitations  Gastrointestinal: No diarrhea or constipation. No abdominal pain. No hematochezia  Genitourinary: see HPI  Musculoskeletal: No pain or swelling of joints, normal range of motion  Neurologic: No weakness or tremors  Psychiatric: No recent changes in memory or mood  Hematologic/Lymphatic/Immunologic: No easy bruising or enlarged lymph nodes  Endocrine: No weight gain or loss      PHYSICAL EXAM:    BP (!) 141/87   Pulse 81   Ht 1.727 m (5' 8\")   Wt 82.6 kg (182 lb)   BMI 27.67 kg/m    General appearance: In NAD, conversant  HEENT: Normocephalic and atraumatic, anicteric sclera  Cardiovascular: Not examined  Respiratory: normal, non-labored breathing  Gastrointestinal: negative, Abdomen soft, non-tender, and non-distended.   Musculoskeletal: Not Examined  Peripheral Vascular/extremity: No peripheral edema  Skin: Normal temperature, turgor, and texture. No rash  Psychiatric: Appropriate affect, alert and oriented to person, place, and time    Penis: Normal  Scrotal skin: Normal, no " lesions  Testicles: Normal to palpation bilaterally  Epididymis: Normal to palpation bilaterally  Lymphatic: Normal inguinal lymph nodes    Digital Rectal Exam: The prostate is moderately enlarged, benign and symmetric to palpation    Cystoscopy: Not done      PSA: 6.11    UA RESULTS:  Recent Labs   Lab Test 07/16/15  1348   COLOR Yellow   APPEARANCE Clear   URINEGLC Negative   URINEBILI Negative   URINEKETONE Negative   SG 1.020   UBLD Negative   URINEPH 7.0   PROTEIN Negative   UROBILINOGEN 0.2   NITRITE Negative   LEUKEST Negative       Bladder Scan: 60mL    Other Labs:      Imaging Studies: None      CLINICAL IMPRESSION:   Elevated PSA    PLAN:   He has had 3 PSA checks in the last year and a half but will come back elevated.  His evaluation is otherwise benign.  I recommended further evaluation for a potential early prostate cancer.  I recommended we begin our evaluation with an MRI of the prostate and this test was discussed.  If there are concerning findings on an MRI of the prostate he will need a prostate biopsy, and this was discussed as well.  He agrees to the plan.    MRI of the prostate was ordered for him, I will call him when results are available.      Jere Rutherford MD

## 2023-03-21 NOTE — PROGRESS NOTES
[facility]  Main Office: [address]  [address]  [place] [address]       CHIEF COMPLAINT:  Elevated PSA    HISTORY:   I was asked by Toby Escobedo PA-C to see this healthy 58-year-old gentleman who presents with an elevated PSA.  The PSA was first elevated in  5.07. After that it stabilized 6.  This year is up further at 6.11.  He has no family history of prostate cancer but his father did have colon cancer.  He says that it takes him a long time to empty his bladder but that is his only urinary symptom.  He has nocturia 0-1 time nightly.  Overall the slow bladder emptying is not bothersome to him.  He thinks he may have tried a medication for this once for short period of time but that it did not help.  No history of gross hematuria or infections.      PAST MEDICAL HISTORY:   Past Medical History:   Diagnosis Date     Allergic rhinitis, cause unspecified      Depressive disorder     Situational at times     Hypertension 2020       PAST SURGICAL HISTORY:   Past Surgical History:   Procedure Laterality Date     VASECTOMY       VASECTOMY         FAMILY HISTORY:   Family History   Problem Relation Age of Onset     Diabetes Mother      Thyroid Disease Mother      Hypertension Mother      Depression Mother      Cancer - colorectal Father          at about age 74--dx at around age 50     Hypertension Father      Colon Cancer Father      Diabetes Maternal Grandmother      Cerebrovascular Disease Maternal Grandfather      Diabetes Brother         44     Thyroid Disease Brother      Anxiety Disorder Brother      Obesity Brother      Thyroid Disease Brother      Anxiety Disorder Brother      Thyroid Disease Sister      Depression Sister      Bipolar Disorder Sister      Depression Sister      Anxiety Disorder Son      Depression Son      Autism Spectrum Disorder Son      Attention Deficit Disorder Son      Anxiety Disorder Son      Depression Son      Autism Spectrum Disorder Son      Attention  "Deficit Disorder Son        SOCIAL HISTORY:   Social History     Tobacco Use     Smoking status: Never     Passive exposure: Never     Smokeless tobacco: Never   Substance Use Topics     Alcohol use: Yes     Alcohol/week: 1.0 standard drink          Allergies   Allergen Reactions     Seasonal Allergies        No current outpatient medications on file.    Review Of Systems:  Skin: No rash, pruritis, or skin pigmentation  Eyes: No changes in vision  Ears/Nose/Throat: No changes in hearing, no nosebleeds  Respiratory: No shortness of breath, dyspnea on exertion, cough, or hemoptysis  Cardiovascular: No chest pain or palpitations  Gastrointestinal: No diarrhea or constipation. No abdominal pain. No hematochezia  Genitourinary: see HPI  Musculoskeletal: No pain or swelling of joints, normal range of motion  Neurologic: No weakness or tremors  Psychiatric: No recent changes in memory or mood  Hematologic/Lymphatic/Immunologic: No easy bruising or enlarged lymph nodes  Endocrine: No weight gain or loss      PHYSICAL EXAM:    BP (!) 141/87   Pulse 81   Ht 1.727 m (5' 8\")   Wt 82.6 kg (182 lb)   BMI 27.67 kg/m    General appearance: In NAD, conversant  HEENT: Normocephalic and atraumatic, anicteric sclera  Cardiovascular: Not examined  Respiratory: normal, non-labored breathing  Gastrointestinal: negative, Abdomen soft, non-tender, and non-distended.   Musculoskeletal: Not Examined  Peripheral Vascular/extremity: No peripheral edema  Skin: Normal temperature, turgor, and texture. No rash  Psychiatric: Appropriate affect, alert and oriented to person, place, and time    Penis: Normal  Scrotal skin: Normal, no lesions  Testicles: Normal to palpation bilaterally  Epididymis: Normal to palpation bilaterally  Lymphatic: Normal inguinal lymph nodes    Digital Rectal Exam: The prostate is moderately enlarged, benign and symmetric to palpation    Cystoscopy: Not done      PSA: 6.11    UA RESULTS:  Recent Labs   Lab Test " 07/16/15  1348   COLOR Yellow   APPEARANCE Clear   URINEGLC Negative   URINEBILI Negative   URINEKETONE Negative   SG 1.020   UBLD Negative   URINEPH 7.0   PROTEIN Negative   UROBILINOGEN 0.2   NITRITE Negative   LEUKEST Negative       Bladder Scan: 60mL    Other Labs:      Imaging Studies: None      CLINICAL IMPRESSION:   Elevated PSA    PLAN:   He has had 3 PSA checks in the last year and a half but will come back elevated.  His evaluation is otherwise benign.  I recommended further evaluation for a potential early prostate cancer.  I recommended we begin our evaluation with an MRI of the prostate and this test was discussed.  If there are concerning findings on an MRI of the prostate he will need a prostate biopsy, and this was discussed as well.  He agrees to the plan.    MRI of the prostate was ordered for him, I will call him when results are available.      Jere Rutherford MD

## 2023-03-21 NOTE — NURSING NOTE
Chief Complaint   Patient presents with     Elevated PSA     Pt states he has increased urinary frequency, has noticed it takes longer to emtpy bladder    PVR: 60 mL    Christelle Garcia, CMA

## 2023-04-14 ENCOUNTER — HOSPITAL ENCOUNTER (OUTPATIENT)
Dept: MRI IMAGING | Facility: CLINIC | Age: 58
Discharge: HOME | End: 2023-04-14
Attending: UROLOGY | Admitting: UROLOGY
Payer: COMMERCIAL

## 2023-04-14 ENCOUNTER — TELEPHONE (OUTPATIENT)
Dept: SURGERY | Facility: CLINIC | Age: 58
End: 2023-04-14

## 2023-04-14 DIAGNOSIS — R97.20 ELEVATED PSA: Primary | ICD-10-CM

## 2023-04-14 DIAGNOSIS — R97.20 ELEVATED PROSTATE SPECIFIC ANTIGEN (PSA): ICD-10-CM

## 2023-04-14 PROCEDURE — 72197 MRI PELVIS W/O & W/DYE: CPT

## 2023-04-14 PROCEDURE — A9585 GADOBUTROL INJECTION: HCPCS | Performed by: UROLOGY

## 2023-04-14 PROCEDURE — 72197 MRI PELVIS W/O & W/DYE: CPT | Mod: 26 | Performed by: RADIOLOGY

## 2023-04-14 PROCEDURE — 255N000002 HC RX 255 OP 636: Performed by: UROLOGY

## 2023-04-14 RX ORDER — GADOBUTROL 604.72 MG/ML
10 INJECTION INTRAVENOUS ONCE
Status: COMPLETED | OUTPATIENT
Start: 2023-04-14 | End: 2023-04-14

## 2023-04-14 RX ADMIN — GADOBUTROL 8.5 ML: 604.72 INJECTION INTRAVENOUS at 07:53

## 2023-04-14 NOTE — TELEPHONE ENCOUNTER
Spoke on phone re: MRI results  I will see him in 1 month to recheck the PSA and decide on need for biopsy based on that PSA

## 2023-04-15 ENCOUNTER — HEALTH MAINTENANCE LETTER (OUTPATIENT)
Age: 58
End: 2023-04-15

## 2023-05-18 ENCOUNTER — LAB (OUTPATIENT)
Dept: LAB | Facility: CLINIC | Age: 58
End: 2023-05-18
Payer: COMMERCIAL

## 2023-05-18 DIAGNOSIS — R97.20 ELEVATED PSA: ICD-10-CM

## 2023-05-18 LAB — PSA SERPL DL<=0.01 NG/ML-MCNC: 5.67 NG/ML (ref 0–3.5)

## 2023-05-18 PROCEDURE — 36415 COLL VENOUS BLD VENIPUNCTURE: CPT

## 2023-05-18 PROCEDURE — 84153 ASSAY OF PSA TOTAL: CPT

## 2023-05-22 ENCOUNTER — OFFICE VISIT (OUTPATIENT)
Dept: UROLOGY | Facility: CLINIC | Age: 58
End: 2023-05-22
Payer: COMMERCIAL

## 2023-05-22 VITALS
BODY MASS INDEX: 26.98 KG/M2 | SYSTOLIC BLOOD PRESSURE: 153 MMHG | WEIGHT: 178 LBS | HEART RATE: 78 BPM | HEIGHT: 68 IN | DIASTOLIC BLOOD PRESSURE: 89 MMHG

## 2023-05-22 DIAGNOSIS — N40.0 ENLARGED PROSTATE: ICD-10-CM

## 2023-05-22 DIAGNOSIS — R97.20 ELEVATED PROSTATE SPECIFIC ANTIGEN (PSA): Primary | ICD-10-CM

## 2023-05-22 PROCEDURE — 99214 OFFICE O/P EST MOD 30 MIN: CPT | Performed by: UROLOGY

## 2023-05-22 RX ORDER — TAMSULOSIN HYDROCHLORIDE 0.4 MG/1
0.4 CAPSULE ORAL DAILY
Qty: 30 CAPSULE | Refills: 11 | Status: SHIPPED | OUTPATIENT
Start: 2023-05-22 | End: 2024-05-21

## 2023-05-22 ASSESSMENT — PAIN SCALES - GENERAL: PAINLEVEL: NO PAIN (0)

## 2023-05-22 NOTE — LETTER
2023       RE: Castillo Allen  [id number]  [facility] [address]     Dear Colleague,    Thank you for referring your patient, Castillo Allen, to the [facility] [place] at [facility]. Please see a copy of my visit note below.    Office Visit Note  [facility]  [phone]    UROLOGIC DIAGNOSES:   Elevated PSA    CURRENT INTERVENTIONS:   MRI prostate    HISTORY:   Ry returns to clinic today for PSA recheck.  His PSA remains elevated but somewhat improved at 5.67.  He does continue to complain of a slow urinary stream.      PAST MEDICAL HISTORY:   Past Medical History:   Diagnosis Date    Allergic rhinitis, cause unspecified     Depressive disorder     Situational at times    Hypertension 2020       PAST SURGICAL HISTORY:   Past Surgical History:   Procedure Laterality Date    VASECTOMY      VASECTOMY         FAMILY HISTORY:   Family History   Problem Relation Age of Onset    Diabetes Mother     Thyroid Disease Mother     Hypertension Mother     Depression Mother     Cancer - colorectal Father          at about age 74--dx at around age 50    Hypertension Father     Colon Cancer Father     Diabetes Maternal Grandmother     Cerebrovascular Disease Maternal Grandfather     Diabetes Brother         44    Thyroid Disease Brother     Anxiety Disorder Brother     Obesity Brother     Thyroid Disease Brother     Anxiety Disorder Brother     Thyroid Disease Sister     Depression Sister     Bipolar Disorder Sister     Depression Sister     Anxiety Disorder Son     Depression Son     Autism Spectrum Disorder Son     Attention Deficit Disorder Son     Anxiety Disorder Son     Depression Son     Autism Spectrum Disorder Son     Attention Deficit Disorder Son        SOCIAL HISTORY:   Social History     Socioeconomic History    Marital status:      Spouse name: None    Number of children: None    Years of  "education: None    Highest education level: None   Tobacco Use    Smoking status: Never     Passive exposure: Never    Smokeless tobacco: Never   Vaping Use    Vaping status: Never Used     Passive vaping exposure: Yes   Substance and Sexual Activity    Alcohol use: Yes     Alcohol/week: 1.0 standard drink of alcohol    Drug use: No    Sexual activity: Yes     Partners: Female     Birth control/protection: Male Surgical     Comment: Vasectomy   Other Topics Concern    Parent/sibling w/ CABG, MI or angioplasty before 65F 55M? No       Review Of Systems:  Skin: No rash, pruritis, or skin pigmentation  Eyes: No changes in vision  Ears/Nose/Throat: No changes in hearing, no nosebleeds  Respiratory: No shortness of breath, dyspnea on exertion, cough, or hemoptysis  Cardiovascular: No chest pain or palpitations  Gastrointestinal: No diarrhea or constipation. No abdominal pain. No hematochezia  Genitourinary: see HPI  Musculoskeletal: No pain or swelling of joints, normal range of motion  Neurologic: No weakness or tremors  Psychiatric: No recent changes in memory or mood  Hematologic/Lymphatic/Immunologic: No easy bruising or enlarged lymph nodes  Endocrine: No weight gain or loss      PHYSICAL EXAM:    Ht 1.727 m (5' 8\")   Wt 80.7 kg (178 lb)   BMI 27.06 kg/m      Constitutional: Well developed. Conversant and in no acute distress  Eyes: Anicteric sclera, conjunctiva clear, normal extraocular movements  ENT: Normocephalic and atraumatic,   Skin: Warm and dry. No rashes or lesions  Cardiac: No peripheral edema  Back/Flank: Not done  CNS/PNS: Normal musculature and movements, moves all extremities normally  Respiratory: Normal non-labored breathing  Abdomen: Soft nontender and nondistended  Peripheral Vascular: No peripheral edema  Mental Status/Psych: Alert and Oriented x 3. Normal mood and affect    Penis: Not done  Scrotal Skin: Not done  Testicles: Not done  Epididymis: Not done  Digital Rectal Exam:     Cystoscopy: " Not done    Imaging: None    Urinalysis: UA RESULTS:  Recent Labs   Lab Test 03/21/23  0802   COLOR Yellow   APPEARANCE Clear   URINEGLC Negative   URINEBILI Negative   URINEKETONE Negative   SG 1.025   UBLD Negative   URINEPH 6.0   PROTEIN Negative   UROBILINOGEN 0.2   NITRITE Negative   LEUKEST Negative       PSA: 5.67    Post Void Residual:     Other labs: None today      IMPRESSION:  Elevated PSA  Slow urinary stream    PLAN:  We first discussed his PSA.  The PSA remains elevated.  PSA density is 0.15.  We discussed options of proceeding with template prostate biopsy at this time versus following the PSA very closely.  We discussed biopsy in detail today along with its risks and potential outcomes of the biopsy.  At this time he feels comfortable following the PSA closely.  We will plan to check the PSA again in 6 months.    We discussed options for his slow urinary stream.  He does have a slightly enlarged prostate on MRI of the prostate.  He would like to have a trial of Flomax.  I prescribed a medication for him and gave him instructions along with refills.      Jere Rutherford M.D.

## 2023-05-22 NOTE — NURSING NOTE
Chief Complaint   Patient presents with     Elevated PSA     Pt here for follow up with PSA     Christelle Garcia CMA

## 2023-05-22 NOTE — PROGRESS NOTES
Office Visit Note  [facility]  [phone]    UROLOGIC DIAGNOSES:   Elevated PSA    CURRENT INTERVENTIONS:   MRI prostate    HISTORY:   Ry returns to clinic today for PSA recheck.  His PSA remains elevated but somewhat improved at 5.67.  He does continue to complain of a slow urinary stream.      PAST MEDICAL HISTORY:   Past Medical History:   Diagnosis Date     Allergic rhinitis, cause unspecified      Depressive disorder     Situational at times     Hypertension 2020       PAST SURGICAL HISTORY:   Past Surgical History:   Procedure Laterality Date     VASECTOMY       VASECTOMY         FAMILY HISTORY:   Family History   Problem Relation Age of Onset     Diabetes Mother      Thyroid Disease Mother      Hypertension Mother      Depression Mother      Cancer - colorectal Father          at about age 74--dx at around age 50     Hypertension Father      Colon Cancer Father      Diabetes Maternal Grandmother      Cerebrovascular Disease Maternal Grandfather      Diabetes Brother         44     Thyroid Disease Brother      Anxiety Disorder Brother      Obesity Brother      Thyroid Disease Brother      Anxiety Disorder Brother      Thyroid Disease Sister      Depression Sister      Bipolar Disorder Sister      Depression Sister      Anxiety Disorder Son      Depression Son      Autism Spectrum Disorder Son      Attention Deficit Disorder Son      Anxiety Disorder Son      Depression Son      Autism Spectrum Disorder Son      Attention Deficit Disorder Son        SOCIAL HISTORY:   Social History     Socioeconomic History     Marital status:      Spouse name: None     Number of children: None     Years of education: None     Highest education level: None   Tobacco Use     Smoking status: Never     Passive exposure: Never     Smokeless tobacco: Never   Vaping Use     Vaping status: Never Used     Passive vaping exposure: Yes   Substance and Sexual Activity     Alcohol use: Yes      "Alcohol/week: 1.0 standard drink of alcohol     Drug use: No     Sexual activity: Yes     Partners: Female     Birth control/protection: Male Surgical     Comment: Vasectomy   Other Topics Concern     Parent/sibling w/ CABG, MI or angioplasty before 65F 55M? No       Review Of Systems:  Skin: No rash, pruritis, or skin pigmentation  Eyes: No changes in vision  Ears/Nose/Throat: No changes in hearing, no nosebleeds  Respiratory: No shortness of breath, dyspnea on exertion, cough, or hemoptysis  Cardiovascular: No chest pain or palpitations  Gastrointestinal: No diarrhea or constipation. No abdominal pain. No hematochezia  Genitourinary: see HPI  Musculoskeletal: No pain or swelling of joints, normal range of motion  Neurologic: No weakness or tremors  Psychiatric: No recent changes in memory or mood  Hematologic/Lymphatic/Immunologic: No easy bruising or enlarged lymph nodes  Endocrine: No weight gain or loss      PHYSICAL EXAM:    Ht 1.727 m (5' 8\")   Wt 80.7 kg (178 lb)   BMI 27.06 kg/m      Constitutional: Well developed. Conversant and in no acute distress  Eyes: Anicteric sclera, conjunctiva clear, normal extraocular movements  ENT: Normocephalic and atraumatic,   Skin: Warm and dry. No rashes or lesions  Cardiac: No peripheral edema  Back/Flank: Not done  CNS/PNS: Normal musculature and movements, moves all extremities normally  Respiratory: Normal non-labored breathing  Abdomen: Soft nontender and nondistended  Peripheral Vascular: No peripheral edema  Mental Status/Psych: Alert and Oriented x 3. Normal mood and affect    Penis: Not done  Scrotal Skin: Not done  Testicles: Not done  Epididymis: Not done  Digital Rectal Exam:     Cystoscopy: Not done    Imaging: None    Urinalysis: UA RESULTS:  Recent Labs   Lab Test 03/21/23  0802   COLOR Yellow   APPEARANCE Clear   URINEGLC Negative   URINEBILI Negative   URINEKETONE Negative   SG 1.025   UBLD Negative   URINEPH 6.0   PROTEIN Negative   UROBILINOGEN 0.2 "   NITRITE Negative   LEUKEST Negative       PSA: 5.67    Post Void Residual:     Other labs: None today      IMPRESSION:  Elevated PSA  Slow urinary stream    PLAN:  We first discussed his PSA.  The PSA remains elevated.  PSA density is 0.15.  We discussed options of proceeding with template prostate biopsy at this time versus following the PSA very closely.  We discussed biopsy in detail today along with its risks and potential outcomes of the biopsy.  At this time he feels comfortable following the PSA closely.  We will plan to check the PSA again in 6 months.    We discussed options for his slow urinary stream.  He does have a slightly enlarged prostate on MRI of the prostate.  He would like to have a trial of Flomax.  I prescribed a medication for him and gave him instructions along with refills.      Jere Rutherford M.D.

## 2023-08-21 ENCOUNTER — OFFICE VISIT (OUTPATIENT)
Dept: DERMATOLOGY | Facility: CLINIC | Age: 58
End: 2023-08-21
Payer: COMMERCIAL

## 2023-08-21 DIAGNOSIS — L81.4 LENTIGINES: Primary | ICD-10-CM

## 2023-08-21 DIAGNOSIS — L82.1 SEBORRHEIC KERATOSES: ICD-10-CM

## 2023-08-21 DIAGNOSIS — L57.8 ACTINIC SKIN DAMAGE: ICD-10-CM

## 2023-08-21 PROCEDURE — 99213 OFFICE O/P EST LOW 20 MIN: CPT | Performed by: STUDENT IN AN ORGANIZED HEALTH CARE EDUCATION/TRAINING PROGRAM

## 2023-08-21 NOTE — LETTER
8/21/2023         RE: Castillo Allen  [address]  [facility] [address]        Dear Colleague,    Thank you for referring your patient, Castillo Allen, to the [facility]. Please see a copy of my visit note below.    [facility] Dermatology Note    Encounter Date: Aug 21, 2023    Dermatology Problem List:    ______________________________________    Impression/Plan:  Castillo was seen today for derm problem.    Diagnoses and all orders for this visit:    Lentigines  Actinic skin damage  - discussed sunprotective behaviors, clothing, and the use of sunscreen  - recently returned from [place], had a good time, uses spray on sunscreen     Seborrheic keratoses  - R forehead and R ankle  - benign          Follow-up PRN.       Staff Involved:  Staff Only    Itz Granados MD   of Dermatology  Department of Dermatology  [facility] [employer]      CC:   Chief Complaint   Patient presents with     Derm Problem     Spot on forehead   R ankle lesion - has been there for over 10 years comes and goes        History of Present Illness:  Mr. Castillo Allen is a 58 year old male who presents as a new patient.    Spot on forehead that grew, and stuck around, has resolved    R ankle spot has been present for 10 years. Comes and goes.     Labs:      Physical exam:  Vitals: There were no vitals taken for this visit.  GEN: well developed, well-nourished, in no acute distress, in a pleasant mood.     SKIN: Sanchez phototype 1  - Sun-exposed skin, which includes the head/face, neck, both arms, digits, and/or nails was examined.   - Stuck on brown papules on trunk and extremities   - No other lesions of concern on areas examined.     Past Medical History:   Past Medical History:   Diagnosis Date     Allergic rhinitis, cause unspecified      Depressive disorder     Situational at times     Hypertension 11/01/2020     Past Surgical  History:   Procedure Laterality Date     VASECTOMY       VASECTOMY         Social History:   reports that he has never smoked. He has never been exposed to tobacco smoke. He has never used smokeless tobacco. He reports current alcohol use of about 1.0 standard drink of alcohol per week. He reports that he does not use drugs.    Family History:  Family History   Problem Relation Age of Onset     Diabetes Mother      Thyroid Disease Mother      Hypertension Mother      Depression Mother      Cancer - colorectal Father          at about age 74--dx at around age 50     Hypertension Father      Colon Cancer Father      Diabetes Maternal Grandmother      Cerebrovascular Disease Maternal Grandfather      Diabetes Brother         44     Thyroid Disease Brother      Anxiety Disorder Brother      Obesity Brother      Thyroid Disease Brother      Anxiety Disorder Brother      Thyroid Disease Sister      Depression Sister      Bipolar Disorder Sister      Depression Sister      Anxiety Disorder Son      Depression Son      Autism Spectrum Disorder Son      Attention Deficit Disorder Son      Anxiety Disorder Son      Depression Son      Autism Spectrum Disorder Son      Attention Deficit Disorder Son        Medications:  Current Outpatient Medications   Medication Sig Dispense Refill     tamsulosin (FLOMAX) 0.4 MG capsule Take 1 capsule (0.4 mg) by mouth daily 30 capsule 11     Allergies   Allergen Reactions     Seasonal Allergies                  Again, thank you for allowing me to participate in the care of your patient.        Sincerely,        Itz Granados MD

## 2023-08-21 NOTE — PROGRESS NOTES
[facility] Dermatology Note    Encounter Date: Aug 21, 2023    Dermatology Problem List:    ______________________________________    Impression/Plan:  Castillo was seen today for derm problem.    Diagnoses and all orders for this visit:    Lentigines  Actinic skin damage  - discussed sunprotective behaviors, clothing, and the use of sunscreen  - recently returned from [place], had a good time, uses spray on sunscreen     Seborrheic keratoses  - R forehead and R ankle  - benign          Follow-up PRN.       Staff Involved:  Staff Only    Itz Granados MD   of Dermatology  Department of Dermatology  [facility] [employer] [facility]      CC:   Chief Complaint   Patient presents with    Derm Problem     Spot on forehead   R ankle lesion - has been there for over 10 years comes and goes        History of Present Illness:  Mr. Castillo Allen is a 58 year old male who presents as a new patient.    Spot on forehead that grew, and stuck around, has resolved    R ankle spot has been present for 10 years. Comes and goes.     Labs:      Physical exam:  Vitals: There were no vitals taken for this visit.  GEN: well developed, well-nourished, in no acute distress, in a pleasant mood.     SKIN: Sanchez phototype 1  - Sun-exposed skin, which includes the head/face, neck, both arms, digits, and/or nails was examined.   - Stuck on brown papules on trunk and extremities   - No other lesions of concern on areas examined.     Past Medical History:   Past Medical History:   Diagnosis Date    Allergic rhinitis, cause unspecified     Depressive disorder     Situational at times    Hypertension 11/01/2020     Past Surgical History:   Procedure Laterality Date    VASECTOMY      VASECTOMY         Social History:   reports that he has never smoked. He has never been exposed to tobacco smoke. He has never used smokeless tobacco. He reports current alcohol use of about 1.0 standard drink of  alcohol per week. He reports that he does not use drugs.    Family History:  Family History   Problem Relation Age of Onset    Diabetes Mother     Thyroid Disease Mother     Hypertension Mother     Depression Mother     Cancer - colorectal Father          at about age 74--dx at around age 50    Hypertension Father     Colon Cancer Father     Diabetes Maternal Grandmother     Cerebrovascular Disease Maternal Grandfather     Diabetes Brother         44    Thyroid Disease Brother     Anxiety Disorder Brother     Obesity Brother     Thyroid Disease Brother     Anxiety Disorder Brother     Thyroid Disease Sister     Depression Sister     Bipolar Disorder Sister     Depression Sister     Anxiety Disorder Son     Depression Son     Autism Spectrum Disorder Son     Attention Deficit Disorder Son     Anxiety Disorder Son     Depression Son     Autism Spectrum Disorder Son     Attention Deficit Disorder Son        Medications:  Current Outpatient Medications   Medication Sig Dispense Refill    tamsulosin (FLOMAX) 0.4 MG capsule Take 1 capsule (0.4 mg) by mouth daily 30 capsule 11     Allergies   Allergen Reactions    Seasonal Allergies

## 2023-09-06 ENCOUNTER — IMMUNIZATION (OUTPATIENT)
Dept: FAMILY MEDICINE | Facility: CLINIC | Age: 58
End: 2023-09-06
Payer: COMMERCIAL

## 2023-09-06 DIAGNOSIS — Z23 NEED FOR PROPHYLACTIC VACCINATION AND INOCULATION AGAINST INFLUENZA: Primary | ICD-10-CM

## 2023-09-06 PROCEDURE — 90682 RIV4 VACC RECOMBINANT DNA IM: CPT

## 2023-09-06 PROCEDURE — 99207 PR NO CHARGE NURSE ONLY: CPT

## 2023-09-06 PROCEDURE — 90471 IMMUNIZATION ADMIN: CPT

## 2023-09-28 ASSESSMENT — PATIENT HEALTH QUESTIONNAIRE - PHQ9
SUM OF ALL RESPONSES TO PHQ QUESTIONS 1-9: 2
10. IF YOU CHECKED OFF ANY PROBLEMS, HOW DIFFICULT HAVE THESE PROBLEMS MADE IT FOR YOU TO DO YOUR WORK, TAKE CARE OF THINGS AT HOME, OR GET ALONG WITH OTHER PEOPLE: NOT DIFFICULT AT ALL
SUM OF ALL RESPONSES TO PHQ QUESTIONS 1-9: 2

## 2023-09-28 ASSESSMENT — ENCOUNTER SYMPTOMS
DIZZINESS: 0
FEVER: 0
WEAKNESS: 0
ARTHRALGIAS: 1
JOINT SWELLING: 0
FREQUENCY: 1
EYE PAIN: 0
HEADACHES: 0
CONSTIPATION: 0
MYALGIAS: 0
DYSURIA: 0
HEMATURIA: 0
DIARRHEA: 0
COUGH: 0
NAUSEA: 0
ABDOMINAL PAIN: 0
SORE THROAT: 0
PALPITATIONS: 0
HEARTBURN: 0
HEMATOCHEZIA: 0
PARESTHESIAS: 0
NERVOUS/ANXIOUS: 0
CHILLS: 0
SHORTNESS OF BREATH: 0

## 2023-09-29 ENCOUNTER — OFFICE VISIT (OUTPATIENT)
Dept: FAMILY MEDICINE | Facility: CLINIC | Age: 58
End: 2023-09-29
Payer: COMMERCIAL

## 2023-09-29 ENCOUNTER — TELEPHONE (OUTPATIENT)
Dept: FAMILY MEDICINE | Facility: CLINIC | Age: 58
End: 2023-09-29

## 2023-09-29 VITALS
HEART RATE: 88 BPM | SYSTOLIC BLOOD PRESSURE: 128 MMHG | TEMPERATURE: 97.8 F | BODY MASS INDEX: 28.28 KG/M2 | HEIGHT: 68 IN | DIASTOLIC BLOOD PRESSURE: 88 MMHG | OXYGEN SATURATION: 98 % | WEIGHT: 186.6 LBS | RESPIRATION RATE: 16 BRPM

## 2023-09-29 DIAGNOSIS — R03.0 ELEVATED BLOOD PRESSURE READING WITHOUT DIAGNOSIS OF HYPERTENSION: ICD-10-CM

## 2023-09-29 DIAGNOSIS — M54.10 RADICULAR SYNDROME OF LEFT LEG: ICD-10-CM

## 2023-09-29 DIAGNOSIS — M25.561 RIGHT ANTERIOR KNEE PAIN: ICD-10-CM

## 2023-09-29 DIAGNOSIS — Z00.00 ROUTINE GENERAL MEDICAL EXAMINATION AT A HEALTH CARE FACILITY: Primary | ICD-10-CM

## 2023-09-29 DIAGNOSIS — R73.03 PRE-DIABETES: Primary | ICD-10-CM

## 2023-09-29 DIAGNOSIS — R97.20 ELEVATED PROSTATE SPECIFIC ANTIGEN (PSA): ICD-10-CM

## 2023-09-29 LAB
ANION GAP SERPL CALCULATED.3IONS-SCNC: 9 MMOL/L (ref 7–15)
BUN SERPL-MCNC: 17.4 MG/DL (ref 6–20)
CALCIUM SERPL-MCNC: 8.9 MG/DL (ref 8.6–10)
CHLORIDE SERPL-SCNC: 103 MMOL/L (ref 98–107)
CHOLEST SERPL-MCNC: 168 MG/DL
CREAT SERPL-MCNC: 0.96 MG/DL (ref 0.67–1.17)
DEPRECATED HCO3 PLAS-SCNC: 25 MMOL/L (ref 22–29)
EGFRCR SERPLBLD CKD-EPI 2021: >90 ML/MIN/1.73M2
GLUCOSE SERPL-MCNC: 116 MG/DL (ref 70–99)
HDLC SERPL-MCNC: 40 MG/DL
LDLC SERPL CALC-MCNC: 94 MG/DL
NONHDLC SERPL-MCNC: 128 MG/DL
POTASSIUM SERPL-SCNC: 4 MMOL/L (ref 3.4–5.3)
SODIUM SERPL-SCNC: 137 MMOL/L (ref 135–145)
TRIGL SERPL-MCNC: 171 MG/DL

## 2023-09-29 PROCEDURE — 90746 HEPB VACCINE 3 DOSE ADULT IM: CPT | Performed by: PHYSICIAN ASSISTANT

## 2023-09-29 PROCEDURE — 80048 BASIC METABOLIC PNL TOTAL CA: CPT | Performed by: PHYSICIAN ASSISTANT

## 2023-09-29 PROCEDURE — 99396 PREV VISIT EST AGE 40-64: CPT | Mod: 25 | Performed by: PHYSICIAN ASSISTANT

## 2023-09-29 PROCEDURE — 36415 COLL VENOUS BLD VENIPUNCTURE: CPT | Performed by: PHYSICIAN ASSISTANT

## 2023-09-29 PROCEDURE — 80061 LIPID PANEL: CPT | Performed by: PHYSICIAN ASSISTANT

## 2023-09-29 PROCEDURE — 99214 OFFICE O/P EST MOD 30 MIN: CPT | Mod: 25 | Performed by: PHYSICIAN ASSISTANT

## 2023-09-29 PROCEDURE — 90471 IMMUNIZATION ADMIN: CPT | Performed by: PHYSICIAN ASSISTANT

## 2023-09-29 ASSESSMENT — ENCOUNTER SYMPTOMS
DYSURIA: 0
PARESTHESIAS: 0
DIARRHEA: 0
ABDOMINAL PAIN: 0
HEARTBURN: 0
NERVOUS/ANXIOUS: 0
HEMATURIA: 0
PALPITATIONS: 0
CONSTIPATION: 0
WEAKNESS: 0
COUGH: 0
MYALGIAS: 0
SHORTNESS OF BREATH: 0
FEVER: 0
NAUSEA: 0
HEADACHES: 0
JOINT SWELLING: 0
SORE THROAT: 0
EYE PAIN: 0
ARTHRALGIAS: 1
HEMATOCHEZIA: 0
CHILLS: 0
FREQUENCY: 1
DIZZINESS: 0

## 2023-09-29 ASSESSMENT — PAIN SCALES - GENERAL: PAINLEVEL: NO PAIN (0)

## 2023-09-29 NOTE — TELEPHONE ENCOUNTER
Can someone please call this patient and help him set up xrays I ordered at his visit. I think there was confusion and someone scheduled the patient seen after this one (at 730am, 9/29/23) for an xray and not actually this patient.

## 2023-09-29 NOTE — PATIENT INSTRUCTIONS
To complete the Hep B series schedule nurse only appts in around 1 month (no sooner) and 6 months (no sooner)      Preventive Health Recommendations  Male Ages 50 - 64    Yearly exam:             See your health care provider every year in order to  o   Review health changes.   o   Discuss preventive care.    o   Review your medicines if your doctor has prescribed any.   Have a cholesterol test every 5 years, or more frequently if you are at risk for high cholesterol/heart disease.   Have a diabetes test (fasting glucose) every three years. If you are at risk for diabetes, you should have this test more often.   Have a colonoscopy at age 50, or have a yearly FIT test (stool test). These exams will check for colon cancer.    Talk with your health care provider about whether or not a prostate cancer screening test (PSA) is right for you.  You should be tested each year for STDs (sexually transmitted diseases), if you re at risk.     Shots: Get a flu shot each year. Get a tetanus shot every 10 years.     Nutrition:  Eat at least 5 servings of fruits and vegetables daily.   Eat whole-grain bread, whole-wheat pasta and brown rice instead of white grains and rice.   Get adequate Calcium and Vitamin D.     Lifestyle  Exercise for at least 150 minutes a week (30 minutes a day, 5 days a week). This will help you control your weight and prevent disease.   Limit alcohol to one drink per day.   No smoking.   Wear sunscreen to prevent skin cancer.   See your dentist every six months for an exam and cleaning.   See your eye doctor every 1 to 2 years.

## 2023-09-29 NOTE — NURSING NOTE
"Chief Complaint   Patient presents with    Physical    Blood Draw     Fasting labs.      Initial /88 (BP Location: Right arm, Patient Position: Sitting, Cuff Size: Adult Regular)   Pulse 88   Temp 97.8  F (36.6  C) (Oral)   Resp 16   Ht 1.715 m (5' 7.5\")   Wt 84.6 kg (186 lb 9.6 oz)   SpO2 98%   BMI 28.79 kg/m   Estimated body mass index is 28.79 kg/m  as calculated from the following:    Height as of this encounter: 1.715 m (5' 7.5\").    Weight as of this encounter: 84.6 kg (186 lb 9.6 oz).  BP completed using cuff size regular long right arm    Lisa Magill, CMA    "

## 2023-09-29 NOTE — PROGRESS NOTES
SUBJECTIVE:   CC: Ry is an 58 year old who presents for preventative health visit.       9/29/2023     6:49 AM   Additional Questions   Roomed by Lisa Magill, CMA   Accompanied by self         9/29/2023     6:49 AM   Patient Reported Additional Medications   Patient reports taking the following new medications NONE       Healthy Habits:     Getting at least 3 servings of Calcium per day:  NO    Bi-annual eye exam:  Yes    Dental care twice a year:  Yes    Sleep apnea or symptoms of sleep apnea:  None    Diet:  Regular (no restrictions)    Frequency of exercise:  1 day/week    Duration of exercise:  Less than 15 minutes    Taking medications regularly:  Yes    Barriers to taking medications:  None    Medication side effects:  Not applicable    Additional concerns today:  Yes    Started flomax in May - finding it somewhat helpful; forgetting some days to take; less night time urination    Elevated Blood Pressure readings - still getting some elevated at home; have never validated cuff here    Left leg pain off and on   -more radicular in nature; more consistent than prior years  -feeling everyday but not 24/7  -slight back pain, minor; low  -pain along the lateral left leg and can travel down the left foot over the top    Past month or so right sharp pain in front of right knee-worse when going downstairs   NO major injury - pain is intermittent; he'll be doing something and then start to feel pain at the inferior part of the kneecap  Then when flexing the knee or going down stairs he'll have the pain  Once the pain hits, it'll be painful for a few hours      Today's PHQ-9 Score:       9/28/2023    10:34 AM   PHQ-9 SCORE   PHQ-9 Total Score MyChart 2 (Minimal depression)   PHQ-9 Total Score 2           Social History     Tobacco Use    Smoking status: Never     Passive exposure: Never    Smokeless tobacco: Never   Substance Use Topics    Alcohol use: Yes     Alcohol/week: 1.0 standard drink of alcohol     Comment:  "Minimal, social             9/28/2023    10:38 AM   Alcohol Use   Prescreen: >3 drinks/day or >7 drinks/week? No          No data to display                Last PSA:   PSA   Date Value Ref Range Status   09/13/2017 3.18 0 - 4 ug/L Final     Comment:     Assay Method:  Chemiluminescence using Siemens Vista analyzer     Prostate Specific Antigen Screen   Date Value Ref Range Status   12/20/2021 4.96 (H) 0.00 - 4.00 ug/L Final     PSA Tumor Marker   Date Value Ref Range Status   05/18/2023 5.67 (H) 0.00 - 3.50 ng/mL Final       Reviewed orders with patient. Reviewed health maintenance and updated orders accordingly - Yes  Lab work is in process  Labs reviewed in EPIC    Reviewed and updated as needed this visit by clinical staff   Tobacco  Allergies  Meds  Problems  Med Hx  Surg Hx  Fam Hx          Reviewed and updated as needed this visit by Provider                     Review of Systems   Constitutional:  Negative for chills and fever.   HENT:  Positive for hearing loss. Negative for congestion, ear pain and sore throat.    Eyes:  Negative for pain and visual disturbance.   Respiratory:  Negative for cough and shortness of breath.    Cardiovascular:  Negative for chest pain, palpitations and peripheral edema.   Gastrointestinal:  Negative for abdominal pain, constipation, diarrhea, heartburn, hematochezia and nausea.   Genitourinary:  Positive for frequency. Negative for dysuria, genital sores, hematuria, impotence, penile discharge and urgency.   Musculoskeletal:  Positive for arthralgias. Negative for joint swelling and myalgias.   Skin:  Negative for rash.   Neurological:  Negative for dizziness, weakness, headaches and paresthesias.   Psychiatric/Behavioral:  Negative for mood changes. The patient is not nervous/anxious.        OBJECTIVE:   /88 (BP Location: Right arm, Patient Position: Sitting, Cuff Size: Adult Regular)   Pulse 88   Temp 97.8  F (36.6  C) (Oral)   Resp 16   Ht 1.715 m (5' 7.5\")  "  Wt 84.6 kg (186 lb 9.6 oz)   SpO2 98%   BMI 28.79 kg/m      Physical Exam  GENERAL: healthy, alert and no distress  EYES: Eyes grossly normal to inspection, PERRL and conjunctivae and sclerae normal  HENT: ear canals and TM's normal, nose and mouth without ulcers or lesions  NECK: no adenopathy, no asymmetry, masses, or scars and thyroid normal to palpation  RESP: lungs clear to auscultation - no rales, rhonchi or wheezes  CV: regular rate and rhythm, normal S1 S2, no S3 or S4, no murmur, click or rub, no peripheral edema and peripheral pulses strong  ABDOMEN: soft, nontender, no hepatosplenomegaly, no masses and bowel sounds normal  MS: right knee shows negative meniscal provocation. There is no swelling or warmth. Very slight pain anteriorly and inferiorly to the patella. Left leg radicular symptoms not reproducible. No weakness noted  SKIN: no suspicious lesions or rashes  PSYCH: mentation appears normal, affect normal/bright    Diagnostic Test Results:  Labs reviewed in Epic    ASSESSMENT/PLAN:   1. Routine general medical examination at a health care facility  Reviewed personal and family history. Reviewed age appropriate screenings. Recommended any needed vaccinations.  - Lipid panel reflex to direct LDL Fasting; Future  - Basic metabolic panel  (Ca, Cl, CO2, Creat, Gluc, K, Na, BUN); Future  - Lipid panel reflex to direct LDL Fasting  - Basic metabolic panel  (Ca, Cl, CO2, Creat, Gluc, K, Na, BUN)    2. Radicular syndrome of left leg  Start with lumbar spine xray. If ok would recommend initiation of therapy x 6 weeks and if not improving then to spine  - XR Lumbar Spine 2/3 Views; Future      4. Right anterior knee pain  Unclear. Neg on meniscal provocation. Question pf tendonitis though given the intermittent nature less sure. Start with imaging  - XR Knee Right 3 Views; Future    5. Elevated blood pressure reading without diagnosis of hypertension  Good again today. Recommend bringing in home cuff    6.  "Elevated prostate specific antigen (PSA)  Following with urology. Flomas possibly slightly helpful        COUNSELING:   Reviewed preventive health counseling, as reflected in patient instructions      BMI:   Estimated body mass index is 28.79 kg/m  as calculated from the following:    Height as of this encounter: 1.715 m (5' 7.5\").    Weight as of this encounter: 84.6 kg (186 lb 9.6 oz).       He reports that he has never smoked. He has never been exposed to tobacco smoke. He has never used smokeless tobacco.            Nicolas Escobedo PA-C  [facility] ROSEMOUNTAnswers submitted by the patient for this visit:  Patient Health Questionnaire (Submitted on 9/28/2023)  If you checked off any problems, how difficult have these problems made it for you to do your work, take care of things at home, or get along with other people?: Not difficult at all  PHQ9 TOTAL SCORE: 2    "

## 2023-10-02 ENCOUNTER — ANCILLARY PROCEDURE (OUTPATIENT)
Dept: GENERAL RADIOLOGY | Facility: CLINIC | Age: 58
End: 2023-10-02
Attending: PHYSICIAN ASSISTANT
Payer: COMMERCIAL

## 2023-10-02 DIAGNOSIS — M25.561 RIGHT ANTERIOR KNEE PAIN: ICD-10-CM

## 2023-10-02 DIAGNOSIS — M54.10 RADICULAR SYNDROME OF LEFT LEG: ICD-10-CM

## 2023-10-02 PROCEDURE — 72100 X-RAY EXAM L-S SPINE 2/3 VWS: CPT | Mod: TC | Performed by: PREVENTIVE MEDICINE

## 2023-10-02 PROCEDURE — 73562 X-RAY EXAM OF KNEE 3: CPT | Mod: TC | Performed by: RADIOLOGY

## 2023-10-17 ENCOUNTER — MYC MEDICAL ADVICE (OUTPATIENT)
Dept: FAMILY MEDICINE | Facility: CLINIC | Age: 58
End: 2023-10-17
Payer: COMMERCIAL

## 2023-11-03 ASSESSMENT — ACTIVITIES OF DAILY LIVING (ADL)
LIMPING: THE SYMPTOM AFFECTS MY ACTIVITY SLIGHTLY
GIVING WAY, BUCKLING OR SHIFTING OF KNEE: I DO NOT HAVE THE SYMPTOM
KNEE_ACTIVITY_OF_DAILY_LIVING_SCORE: 85.71
HOW_WOULD_YOU_RATE_THE_OVERALL_FUNCTION_OF_YOUR_KNEE_DURING_YOUR_USUAL_DAILY_ACTIVITIES?: ABNORMAL
HOW_WOULD_YOU_RATE_THE_CURRENT_FUNCTION_OF_YOUR_KNEE_DURING_YOUR_USUAL_DAILY_ACTIVITIES_ON_A_SCALE_FROM_0_TO_100_WITH_100_BEING_YOUR_LEVEL_OF_KNEE_FUNCTION_PRIOR_TO_YOUR_INJURY_AND_0_BEING_THE_INABILITY_TO_PERFORM_ANY_OF_YOUR_USUAL_DAILY_ACTIVITIES?: 75
WALK: ACTIVITY IS MINIMALLY DIFFICULT
AS_A_RESULT_OF_YOUR_KNEE_INJURY,_HOW_WOULD_YOU_RATE_YOUR_CURRENT_LEVEL_OF_DAILY_ACTIVITY?: ABNORMAL
SQUAT: ACTIVITY IS MINIMALLY DIFFICULT
GO DOWN STAIRS: ACTIVITY IS FAIRLY DIFFICULT
GO UP STAIRS: ACTIVITY IS NOT DIFFICULT
SIT WITH YOUR KNEE BENT: ACTIVITY IS NOT DIFFICULT
STIFFNESS: I DO NOT HAVE THE SYMPTOM
PAIN: THE SYMPTOM AFFECTS MY ACTIVITY MODERATELY
STAND: ACTIVITY IS NOT DIFFICULT
RAW_SCORE: 60
KNEEL ON THE FRONT OF YOUR KNEE: ACTIVITY IS NOT DIFFICULT
WEAKNESS: I DO NOT HAVE THE SYMPTOM
KNEE_ACTIVITY_OF_DAILY_LIVING_SUM: 60
RISE FROM A CHAIR: ACTIVITY IS NOT DIFFICULT
SWELLING: I DO NOT HAVE THE SYMPTOM

## 2023-11-09 ENCOUNTER — THERAPY VISIT (OUTPATIENT)
Dept: PHYSICAL THERAPY | Facility: CLINIC | Age: 58
End: 2023-11-09
Payer: COMMERCIAL

## 2023-11-09 DIAGNOSIS — M17.11 OSTEOARTHRITIS OF RIGHT KNEE, UNSPECIFIED OSTEOARTHRITIS TYPE: Primary | ICD-10-CM

## 2023-11-09 PROCEDURE — 97110 THERAPEUTIC EXERCISES: CPT | Mod: GP | Performed by: PHYSICAL THERAPIST

## 2023-11-09 PROCEDURE — 97161 PT EVAL LOW COMPLEX 20 MIN: CPT | Mod: GP | Performed by: PHYSICAL THERAPIST

## 2023-11-09 NOTE — PROGRESS NOTES
PHYSICAL THERAPY EVALUATION  Type of Visit: Evaluation    See electronic medical record for Abuse and Falls Screening details.    Subjective       Presenting condition or subjective complaint: R anterior knee pain, pain is off and on, primarily pain descending  Date of onset: 23    Relevant medical history: Depression; High blood pressure   Dates & types of surgery: N/A    Prior diagnostic imaging/testing results: X-ray     Prior therapy history for the same diagnosis, illness or injury: No        Living Environment  Social support: With a significant other or spouse   Type of home: House   Stairs to enter the home: Yes 2 Is there a railing: No   Ramp: No   Stairs inside the home: Yes 15 Is there a railing: Yes   Help at home: None  Equipment owned:       Employment: Yes Physical therapist (on call)  Hobbies/Interests: Bowling    Patient goals for therapy: Descend stairs, jog,    Pain assessment: Location: Anterior R knee/Ratin/10, worst 7/10     Objective   KNEE:    Standing Posture: slight lack of knee ext on L    Gait: WNL    Functional:   - Squat/ SL Squat: anterior knee excursion/ bilateral vaglus           AROM: (* indicates patient's pain)   PROM:(over pressure)   L  R L R   Hyperextension   2 deg  2 deg, increased tightness     Extension         Flexion   138 135       Strength:   L R   HIP     Flex 4+ 4   Ext 4 4   ABd 4 4   KNEE     Flex     Ext         Special tests:   L R   Sweep Test  1+       Palpation: slight delay in quad activation on R compared to L    Patellar tracking: WNL         Assessment & Plan   CLINICAL IMPRESSIONS  Medical Diagnosis: Self referred    Treatment Diagnosis: R knee osteoarthritis   Impression/Assessment: Patient is a 58 year old male with R anterior knee pain complaints.  The following significant findings have been identified: Pain, Decreased ROM/flexibility, Decreased joint mobility, Decreased strength, Impaired balance, Inflammation, Impaired gait, Impaired muscle  performance, and Decreased activity tolerance. These impairments interfere with their ability to perform self care tasks, recreational activities, household chores, household mobility, and community mobility as compared to previous level of function.     Clinical Decision Making (Complexity):  Clinical Presentation: Stable/Uncomplicated  Clinical Presentation Rationale: based on medical and personal factors listed in PT evaluation  Clinical Decision Making (Complexity): Low complexity    PLAN OF CARE  Treatment Interventions:  Interventions: Gait Training, Manual Therapy, Neuromuscular Re-education, Therapeutic Activity, Therapeutic Exercise    Long Term Goals     PT Goal 1  Goal Identifier: Goal 1  Goal Description: Pt will be able to go down stairs painfree w/o railing  Rationale: to maximize safety and independence within the home  Target Date: 01/25/24      Frequency of Treatment: 1x/week  Duration of Treatment: 11 weeks      Education Assessment:   Learner/Method: Patient;Pictures/Video    Risks and benefits of evaluation/treatment have been explained.   Patient/Family/caregiver agrees with Plan of Care.     Evaluation Time:          Signing Clinician: Nery Fox, PT

## 2023-11-16 ENCOUNTER — THERAPY VISIT (OUTPATIENT)
Dept: PHYSICAL THERAPY | Facility: CLINIC | Age: 58
End: 2023-11-16
Payer: COMMERCIAL

## 2023-11-16 DIAGNOSIS — M17.11 OSTEOARTHRITIS OF RIGHT KNEE, UNSPECIFIED OSTEOARTHRITIS TYPE: Primary | ICD-10-CM

## 2023-11-16 PROCEDURE — 97110 THERAPEUTIC EXERCISES: CPT | Mod: GP | Performed by: PHYSICAL THERAPIST

## 2023-11-21 ENCOUNTER — LAB (OUTPATIENT)
Dept: LAB | Facility: CLINIC | Age: 58
End: 2023-11-21
Payer: COMMERCIAL

## 2023-11-21 DIAGNOSIS — R97.20 ELEVATED PROSTATE SPECIFIC ANTIGEN (PSA): ICD-10-CM

## 2023-11-21 DIAGNOSIS — R73.03 PRE-DIABETES: ICD-10-CM

## 2023-11-21 LAB
FASTING STATUS PATIENT QL REPORTED: NO
GLUCOSE SERPL-MCNC: 97 MG/DL (ref 70–99)
HBA1C MFR BLD: 5.2 % (ref 0–5.6)
PSA SERPL DL<=0.01 NG/ML-MCNC: 5.33 NG/ML (ref 0–3.5)

## 2023-11-21 PROCEDURE — 84153 ASSAY OF PSA TOTAL: CPT

## 2023-11-21 PROCEDURE — 82947 ASSAY GLUCOSE BLOOD QUANT: CPT

## 2023-11-21 PROCEDURE — 83036 HEMOGLOBIN GLYCOSYLATED A1C: CPT

## 2023-11-21 PROCEDURE — 36415 COLL VENOUS BLD VENIPUNCTURE: CPT

## 2023-11-27 ENCOUNTER — VIRTUAL VISIT (OUTPATIENT)
Dept: UROLOGY | Facility: CLINIC | Age: 58
End: 2023-11-27
Payer: COMMERCIAL

## 2023-11-27 VITALS — BODY MASS INDEX: 28.55 KG/M2 | WEIGHT: 185 LBS

## 2023-11-27 DIAGNOSIS — R97.20 ELEVATED PROSTATE SPECIFIC ANTIGEN (PSA): ICD-10-CM

## 2023-11-27 DIAGNOSIS — N40.0 ENLARGED PROSTATE: Primary | ICD-10-CM

## 2023-11-27 PROCEDURE — 99213 OFFICE O/P EST LOW 20 MIN: CPT | Mod: VID | Performed by: UROLOGY

## 2023-11-27 RX ORDER — TAMSULOSIN HYDROCHLORIDE 0.4 MG/1
0.4 CAPSULE ORAL DAILY
Qty: 90 CAPSULE | Refills: 3 | Status: SHIPPED | OUTPATIENT
Start: 2023-11-27 | End: 2024-11-26

## 2023-11-27 NOTE — PROGRESS NOTES
Office Visit Note  [facility]  [phone]    UROLOGIC DIAGNOSES:   Elevated PSA    CURRENT INTERVENTIONS:   MRI prostate 2020  Trial of Flomax    HISTORY:   Ry had his PSA rechecked again and it improved further at 5.33.  He reports good success with the Flomax.  He has reduced his nocturia and has improved his urinary stream.  He is happy on the medication.      PAST MEDICAL HISTORY:   Past Medical History:   Diagnosis Date    Allergic rhinitis, cause unspecified     Depressive disorder     Situational at times    Hypertension 2020    Osteoarthritis of right knee, unspecified osteoarthritis type 2023       PAST SURGICAL HISTORY:   Past Surgical History:   Procedure Laterality Date    VASECTOMY      VASECTOMY         FAMILY HISTORY:   Family History   Problem Relation Age of Onset    Diabetes Mother     Thyroid Disease Mother     Hypertension Mother     Depression Mother     Cancer - colorectal Father          at about age 74--dx at around age 50    Hypertension Father     Colon Cancer Father     Diabetes Maternal Grandmother     Cerebrovascular Disease Maternal Grandfather     Diabetes Brother         44    Thyroid Disease Brother     Anxiety Disorder Brother     Obesity Brother     Thyroid Disease Brother     Anxiety Disorder Brother     Thyroid Disease Sister     Depression Sister     Bipolar Disorder Sister     Depression Sister     Anxiety Disorder Son     Depression Son     Autism Spectrum Disorder Son     Attention Deficit Disorder Son     Anxiety Disorder Son     Depression Son     Autism Spectrum Disorder Son     Attention Deficit Disorder Son        SOCIAL HISTORY:   Social History     Tobacco Use    Smoking status: Never     Passive exposure: Never    Smokeless tobacco: Never   Substance Use Topics    Alcohol use: Yes     Alcohol/week: 1.0 standard drink of alcohol     Comment: Minimal, social       REVIEW OF SYSTEMS:  Skin: No rash, pruritis, or skin  pigmentation  Eyes: No changes in vision  Ears/Nose/Throat: No changes in hearing, no nosebleeds  Respiratory: No shortness of breath, dyspnea on exertion, cough, or hemoptysis  Cardiovascular: No chest pain or palpitations  Gastrointestinal: No diarrhea or constipation. No abdominal pain. No hematochezia  Genitourinary: see HPI  Musculoskeletal: No pain or swelling of joints, normal range of motion  Neurologic: No weakness or tremors  Psychiatric: No recent changes in memory or mood  Hematologic/Lymphatic/Immunologic: No easy bruising or enlarged lymph nodes  Endocrine: No weight gain or loss      PHYSICAL EXAM:    General: Alert and oriented to time, place, and self. In NAD   HEENT: Head AT/NC, EOMI, CN Grossly intact   Lungs: no respiratory distress, or pursed lip breathing   Heart: No obvious jugular venous distension present   Musculoskeltal: Normal movements. Normal appearing musculature  Skin: no suspicious lesions or rashes   Neuro: Alert, oriented, speech and mentation normal; moving all 4 extremities equally.   Psych: affect and mood normal    Imaging: None    Urinalysis: UA RESULTS:  Recent Labs   Lab Test 03/21/23  0802   COLOR Yellow   APPEARANCE Clear   URINEGLC Negative   URINEBILI Negative   URINEKETONE Negative   SG 1.025   UBLD Negative   URINEPH 6.0   PROTEIN Negative   UROBILINOGEN 0.2   NITRITE Negative   LEUKEST Negative       PSA: 5.33    Post Void Residual:     Other labs: None today      IMPRESSION:  Elevated PSA  Enlarged prostate    PLAN:  He is doing well on the Flomax.  I recommended that he continue the Flomax and I wrote him a new prescription.  I counseled that we can wait 1 year until his next PSA check, and I will see him back for this.      Jere Rutherford M.D.              Virtual Visit Details    Type of service:  Video Visit     Originating Location (pt. Location): Home    Distant Location (provider location):  On-site  Platform used for Video Visit: Vertra

## 2023-11-27 NOTE — LETTER
2023       RE: Castillo Allen  [id number]  [place] [address]     Dear Colleague,    Thank you for referring your patient, Castillo Allen, to the [facility] [place] at [facility]. Please see a copy of my visit note below.    Office Visit Note  [facility] [place] [facility]  [phone]    UROLOGIC DIAGNOSES:   Elevated PSA    CURRENT INTERVENTIONS:   MRI prostate 2020  Trial of Flomax    HISTORY:   Ry had his PSA rechecked again and it improved further at 5.33.  He reports good success with the Flomax.  He has reduced his nocturia and has improved his urinary stream.  He is happy on the medication.      PAST MEDICAL HISTORY:   Past Medical History:   Diagnosis Date    Allergic rhinitis, cause unspecified     Depressive disorder     Situational at times    Hypertension 2020    Osteoarthritis of right knee, unspecified osteoarthritis type 2023       PAST SURGICAL HISTORY:   Past Surgical History:   Procedure Laterality Date    VASECTOMY      VASECTOMY         FAMILY HISTORY:   Family History   Problem Relation Age of Onset    Diabetes Mother     Thyroid Disease Mother     Hypertension Mother     Depression Mother     Cancer - colorectal Father          at about age 74--dx at around age 50    Hypertension Father     Colon Cancer Father     Diabetes Maternal Grandmother     Cerebrovascular Disease Maternal Grandfather     Diabetes Brother         44    Thyroid Disease Brother     Anxiety Disorder Brother     Obesity Brother     Thyroid Disease Brother     Anxiety Disorder Brother     Thyroid Disease Sister     Depression Sister     Bipolar Disorder Sister     Depression Sister     Anxiety Disorder Son     Depression Son     Autism Spectrum Disorder Son     Attention Deficit Disorder Son     Anxiety Disorder Son     Depression Son     Autism Spectrum Disorder Son     Attention Deficit Disorder Son        SOCIAL  HISTORY:   Social History     Tobacco Use    Smoking status: Never     Passive exposure: Never    Smokeless tobacco: Never   Substance Use Topics    Alcohol use: Yes     Alcohol/week: 1.0 standard drink of alcohol     Comment: Minimal, social       REVIEW OF SYSTEMS:  Skin: No rash, pruritis, or skin pigmentation  Eyes: No changes in vision  Ears/Nose/Throat: No changes in hearing, no nosebleeds  Respiratory: No shortness of breath, dyspnea on exertion, cough, or hemoptysis  Cardiovascular: No chest pain or palpitations  Gastrointestinal: No diarrhea or constipation. No abdominal pain. No hematochezia  Genitourinary: see HPI  Musculoskeletal: No pain or swelling of joints, normal range of motion  Neurologic: No weakness or tremors  Psychiatric: No recent changes in memory or mood  Hematologic/Lymphatic/Immunologic: No easy bruising or enlarged lymph nodes  Endocrine: No weight gain or loss      PHYSICAL EXAM:    General: Alert and oriented to time, place, and self. In NAD   HEENT: Head AT/NC, EOMI, CN Grossly intact   Lungs: no respiratory distress, or pursed lip breathing   Heart: No obvious jugular venous distension present   Musculoskeltal: Normal movements. Normal appearing musculature  Skin: no suspicious lesions or rashes   Neuro: Alert, oriented, speech and mentation normal; moving all 4 extremities equally.   Psych: affect and mood normal    Imaging: None    Urinalysis: UA RESULTS:  Recent Labs   Lab Test 03/21/23  0802   COLOR Yellow   APPEARANCE Clear   URINEGLC Negative   URINEBILI Negative   URINEKETONE Negative   SG 1.025   UBLD Negative   URINEPH 6.0   PROTEIN Negative   UROBILINOGEN 0.2   NITRITE Negative   LEUKEST Negative       PSA: 5.33    Post Void Residual:     Other labs: None today      IMPRESSION:  Elevated PSA  Enlarged prostate    PLAN:  He is doing well on the Flomax.  I recommended that he continue the Flomax and I wrote him a new prescription.  I counseled that we can wait 1 year until  his next PSA check, and I will see him back for this.      Jere Rutherford M.D.              Virtual Visit Details    Type of service:  Video Visit     Originating Location (pt. Location): Home    Distant Location (provider location):  On-site  Platform used for Video Visit: [employer]

## 2023-11-27 NOTE — NURSING NOTE
Is the patient currently in the state of [place]? YES    Visit mode:VIDEO    If the visit is dropped, the patient can be reconnected by: VIDEO VISIT: Text to cell phone:   Telephone Information:   Mobile [phone]       Will anyone else be joining the visit? NO  (If patient encounters technical issues they should call [phone]    How would you like to obtain your AVS? MyChart    Are changes needed to the allergy or medication list? No    Reason for visit: RECHECK and Video Visit    Jackeline [place]

## 2023-11-30 ENCOUNTER — THERAPY VISIT (OUTPATIENT)
Dept: PHYSICAL THERAPY | Facility: CLINIC | Age: 58
End: 2023-11-30
Payer: COMMERCIAL

## 2023-11-30 DIAGNOSIS — M17.11 OSTEOARTHRITIS OF RIGHT KNEE, UNSPECIFIED OSTEOARTHRITIS TYPE: Primary | ICD-10-CM

## 2023-11-30 PROCEDURE — 97112 NEUROMUSCULAR REEDUCATION: CPT | Mod: GP | Performed by: PHYSICAL THERAPIST

## 2023-11-30 PROCEDURE — 97110 THERAPEUTIC EXERCISES: CPT | Mod: GP | Performed by: PHYSICAL THERAPIST

## 2023-12-08 ENCOUNTER — THERAPY VISIT (OUTPATIENT)
Dept: PHYSICAL THERAPY | Facility: CLINIC | Age: 58
End: 2023-12-08
Payer: COMMERCIAL

## 2023-12-08 DIAGNOSIS — M17.11 OSTEOARTHRITIS OF RIGHT KNEE, UNSPECIFIED OSTEOARTHRITIS TYPE: Primary | ICD-10-CM

## 2023-12-08 PROCEDURE — 97110 THERAPEUTIC EXERCISES: CPT | Mod: GP | Performed by: PHYSICAL THERAPIST

## 2023-12-08 PROCEDURE — 97112 NEUROMUSCULAR REEDUCATION: CPT | Mod: GP | Performed by: PHYSICAL THERAPIST

## 2023-12-08 ASSESSMENT — ACTIVITIES OF DAILY LIVING (ADL)
KNEEL ON THE FRONT OF YOUR KNEE: ACTIVITY IS NOT DIFFICULT
SIT WITH YOUR KNEE BENT: ACTIVITY IS NOT DIFFICULT
STIFFNESS: I DO NOT HAVE THE SYMPTOM
HOW_WOULD_YOU_RATE_THE_CURRENT_FUNCTION_OF_YOUR_KNEE_DURING_YOUR_USUAL_DAILY_ACTIVITIES_ON_A_SCALE_FROM_0_TO_100_WITH_100_BEING_YOUR_LEVEL_OF_KNEE_FUNCTION_PRIOR_TO_YOUR_INJURY_AND_0_BEING_THE_INABILITY_TO_PERFORM_ANY_OF_YOUR_USUAL_DAILY_ACTIVITIES?: 95
RISE FROM A CHAIR: ACTIVITY IS NOT DIFFICULT
PAIN: I HAVE THE SYMPTOM BUT IT DOES NOT AFFECT MY ACTIVITY
GIVING WAY, BUCKLING OR SHIFTING OF KNEE: I DO NOT HAVE THE SYMPTOM
KNEE_ACTIVITY_OF_DAILY_LIVING_SCORE: 95.71
AS_A_RESULT_OF_YOUR_KNEE_INJURY,_HOW_WOULD_YOU_RATE_YOUR_CURRENT_LEVEL_OF_DAILY_ACTIVITY?: NORMAL
RAW_SCORE: 67
SQUAT: ACTIVITY IS MINIMALLY DIFFICULT
HOW_WOULD_YOU_RATE_THE_OVERALL_FUNCTION_OF_YOUR_KNEE_DURING_YOUR_USUAL_DAILY_ACTIVITIES?: NEARLY NORMAL
WALK: ACTIVITY IS NOT DIFFICULT
GO DOWN STAIRS: ACTIVITY IS MINIMALLY DIFFICULT
STAND: ACTIVITY IS NOT DIFFICULT
SWELLING: I DO NOT HAVE THE SYMPTOM
KNEE_ACTIVITY_OF_DAILY_LIVING_SUM: 67
LIMPING: I DO NOT HAVE THE SYMPTOM
GO UP STAIRS: ACTIVITY IS NOT DIFFICULT
WEAKNESS: I DO NOT HAVE THE SYMPTOM

## 2024-01-30 ENCOUNTER — TRANSFERRED RECORDS (OUTPATIENT)
Dept: HEALTH INFORMATION MANAGEMENT | Facility: CLINIC | Age: 59
End: 2024-01-30
Payer: COMMERCIAL

## 2024-02-01 ENCOUNTER — MEDICAL CORRESPONDENCE (OUTPATIENT)
Dept: SCHEDULING | Facility: CLINIC | Age: 59
End: 2024-02-01
Payer: COMMERCIAL

## 2024-02-06 PROBLEM — M17.11 OSTEOARTHRITIS OF RIGHT KNEE, UNSPECIFIED OSTEOARTHRITIS TYPE: Status: RESOLVED | Noted: 2023-11-09 | Resolved: 2024-02-06

## 2024-04-19 ENCOUNTER — ALLIED HEALTH/NURSE VISIT (OUTPATIENT)
Dept: FAMILY MEDICINE | Facility: CLINIC | Age: 59
End: 2024-04-19
Payer: COMMERCIAL

## 2024-04-19 DIAGNOSIS — Z01.30 BLOOD PRESSURE CHECK: Primary | ICD-10-CM

## 2024-04-19 PROCEDURE — 99207 PR NO CHARGE NURSE ONLY: CPT | Performed by: PHYSICIAN ASSISTANT

## 2024-04-19 NOTE — PROGRESS NOTES
Castillo Allen was evaluated at [facility] on April 19, 2024 at which time his blood pressure was:    BP Readings from Last 1 Encounters:   09/29/23 128/88     Systolic (Patient Reported): (!) 140 (4/19/2024 10:03 AM)  Diastolic (Patient Reported): 84 (4/19/2024 10:03 AM)        Reviewed lifestyle modifications for blood pressure control and reduction: including making healthy food choices, managing weight, getting regular exercise, smoking cessation, reducing alcohol consumption, monitoring blood pressure regularly.     Symptoms: None    BP Goal:< 140/90 mmHg    BP Assessment:  BP not at goal     Potential Reasons for BP too high: NA - Not applicable    BP Follow-Up Plan: Recheck BP in 30 days at pharmacy    Recommendation to Provider: Have patient continue to check BP at home and come into pharmacy in 30 days to recheck     Note completed by: Erinn Contreras RPH

## 2024-05-31 ENCOUNTER — THERAPY VISIT (OUTPATIENT)
Dept: PHYSICAL THERAPY | Facility: CLINIC | Age: 59
End: 2024-05-31
Payer: COMMERCIAL

## 2024-05-31 DIAGNOSIS — H81.11 BENIGN PAROXYSMAL POSITIONAL VERTIGO, RIGHT: Primary | ICD-10-CM

## 2024-05-31 DIAGNOSIS — R42 DIZZINESS: ICD-10-CM

## 2024-05-31 PROCEDURE — 97162 PT EVAL MOD COMPLEX 30 MIN: CPT | Mod: GP | Performed by: PHYSICAL THERAPIST

## 2024-05-31 PROCEDURE — 95992 CANALITH REPOSITIONING PROC: CPT | Mod: GP | Performed by: PHYSICAL THERAPIST

## 2024-05-31 NOTE — PROGRESS NOTES
PHYSICAL THERAPY EVALUATION  Type of Visit: Evaluation    See electronic medical record for Abuse and Falls Screening details.    Subjective       Presenting condition or subjective complaint: dizzinessSymptoms have been coming and going the last week.  Symptoms have been more subtle.  Pt denies nausea.  Pt report the room is not spinning but not steady.   First noticed symptoms sitting up in bed.    Rolling in bed can increase his symptoms but not all the time.  Was treated years ago for BPPV.   He denies history of migraines but endorses family history of migraines.    Date of onset: 05/24/24    Relevant medical history: Depression; Dizziness; Hearing problems; Severe dizziness   Past Medical History:   Diagnosis Date    Allergic rhinitis, cause unspecified     Depressive disorder     Situational at times    Hypertension 11/01/2020    Osteoarthritis of right knee, unspecified osteoarthritis type 11/9/2023       Dates & types of surgery: NA  Past Surgical History:   Procedure Laterality Date    VASECTOMY      VASECTOMY           Prior diagnostic imaging/testing results:     see epic for details  Prior therapy history for the same diagnosis, illness or injury: Yes a few years agoPt reports being treated for BPPV in the past    Prior Level of Function  Transfers: Independent  Ambulation: Independent  ADL: Independent    Living Environment  Social support: With a significant other or spouse   Type of home: House; 2-story; Basement   Stairs to enter the home: Yes 2 Is there a railing: No   Ramp:     Stairs inside the home: Yes       Help at home: None  Equipment owned:         Patient goals for therapy: not be dizzy    Pain assessment: Pain denied     Objective     Infrared Goggle Exam Vestibular Suppressant in Last 24 Hours? No  Exam Completed With: Infrared goggles   Spontaneous Nystagmus    Gaze Evoked Nystagmus    Head Shake Horizontal Nystagmus     Left Right   Toshia-Hallpike Downbeating L torsional Upbeating,  Upbeating R torsional, Increased di zziness   [facility] Supine Roll Test Negative Negative   BPPV Canal(s): R Posterior  BPPV Type: Canalithasis   Cervical ROM WNL, coordination WNL    Patient Supplied Answers To DHI Questionnaire      5/31/2024    10:00 AM   Dizziness Handicap Inventory (DHI)   P1. Does looking up increase your problem? Yes   E2. Because of your problem, do you feel frustrated? Yes   F3. Because of your problem, do you restrict your travel for business or recreation? Sometimes   P4. Does walking down the aisle of a [employer] increase your problems? No   F5. Because of your problem, do you have difficulty getting into or out of bed? No   F6. Does your problem significantly restrict your participation in social activities, such as going out to dinner, going to the movies, dancing, or going to parties? No   F7. Because of your problem, do you have difficulty reading? No   P8. Does performing more ambitious activities such as sports, dancing, household chores (sweeping or putting dishes away) increase your problems? Yes   E9. Because of your problem, are you afraid to leave your home without having someone accompany you? No   E10. Because of your problem, have you been embarrassed in front of others? No   P11. Do quick movements of your head increase your problem? Yes   F12. Because of your problem, do you avoid heights? No   P13. Does turning over in bed increase your problem? Sometimes   F14. Because of your problem, is it difficult for you to do strenuous housework or yard work? Sometimes   E15. Because of your problem, are you afraid people may think you are intoxicated? No   F16. Because of your problem, is it difficult for you to go for a walk by yourself? No   P17. Does walking down a sidewalk increase your problem? No   E18. Because of your problem, is it difficult for you to concentrate? No   F19. Because of your problem, is it difficult for you to walk around your house in the dark? Sometimes    E20. Because of your problem, are you afraid to stay home alone? No   E21. Because of your problem, do you feel handicapped? No   E22. Has your problem placed stress on your relationships with members of your family or friends? No   E23. Because of your problem, are you depressed? No   F24. Does your problem interfere with your job or household responsibilities? Sometimes   P25. Does bending over increase your problem? Sometimes   Dizziness Handicap Inventory - Total Score 28         Assessment & Plan   CLINICAL IMPRESSIONS  Medical Diagnosis: BPPV right    Treatment Diagnosis: dizziness   Impression/Assessment: Patient is a 59 year old male with dizziness complaints.  Pt tested positive for R PC BPPV and was treated with a CRM today.  Pt tolerated CRM well.  The following significant findings have been identified: Instability and Dizziness. These impairments interfere with their ability to perform self care tasks and household chores as compared to previous level of function.     Clinical Decision Making (Complexity):  Clinical Presentation: Evolving/Changing  Clinical Presentation Rationale: based on medical and personal factors listed in PT evaluation  Clinical Decision Making (Complexity): Moderate complexity    PLAN OF CARE  Treatment Interventions:  Interventions: Neuromuscular Re-education, Therapeutic Activity, Self-Care/Home Management, Canalith Repositioning    Long Term Goals     PT Goal 1  Goal Identifier: BPPV  Goal Description: Pt will test negative for BPPV in order to safely perform bed mobiltiy and transfers without symptom interference in order to return to PLOF  Target Date: 07/30/24  PT Goal 2  Goal Identifier: DHI  Goal Description: Pt will score 8 or less on the DHI in order to demonstrate decreased self perceived impairments due to symptoms  Target Date: 07/30/24      Frequency of Treatment: 1-2x/wk  Duration of Treatment: 8 weeks    Recommended Referrals to Other Professionals:   Education  Assessment:   Learner/Method: Patient;Pictures/Video;Listening  Education Comments: Education on diagnosis and POC    Risks and benefits of evaluation/treatment have been explained.   Patient/Family/caregiver agrees with Plan of Care.     Evaluation Time:     PT Farrah, Moderate Complexity Minutes (06026): 21       Signing Clinician: Aleshia Freed PT

## 2024-06-03 ENCOUNTER — THERAPY VISIT (OUTPATIENT)
Dept: PHYSICAL THERAPY | Facility: CLINIC | Age: 59
End: 2024-06-03
Payer: COMMERCIAL

## 2024-06-03 DIAGNOSIS — H81.11 BENIGN PAROXYSMAL POSITIONAL VERTIGO, RIGHT: Primary | ICD-10-CM

## 2024-06-03 DIAGNOSIS — R42 DIZZINESS: ICD-10-CM

## 2024-06-03 PROCEDURE — 95992 CANALITH REPOSITIONING PROC: CPT | Mod: GP | Performed by: PHYSICAL THERAPIST

## 2024-06-11 ENCOUNTER — NURSE TRIAGE (OUTPATIENT)
Dept: FAMILY MEDICINE | Facility: CLINIC | Age: 59
End: 2024-06-11
Payer: COMMERCIAL

## 2024-06-11 NOTE — TELEPHONE ENCOUNTER
Nurse Triage SBAR    Is this a 2nd Level Triage? YES, LICENSED PRACTITIONER REVIEW IS REQUIRED    Situation: coughed up blood x1, recent covid    Background: coughed up blood x1, recent covid     Assessment: Patient calls to report he coughed up blood x1 this morning. He was dx with covid by home test on 6/6, start of symptoms was 6/5. Patient reports all symptoms were improving until this morning he coughed up about a 1/2 teaspoon of sputum, streaked with bright red blood. This occurred once. He reports mild shortness of breath at rest, this has been going on since being dx with covid. He is not coughing frequently, has only coughed twice today. Pt denies chest pain. No hx of blood clots, no recent traveling or surgeries.    Protocol Recommended Disposition:   Go To Office Now    Recommendation: Routing to PCP. Pt wondering if appointment is needed or can he continue to monitor symptoms.      Routed to provider    Does the patient meet one of the following criteria for ADS visit consideration? 16+ years old, with an [facility] PCP     Dena Troncoso RN on 6/11/2024 at 9:06 AM    Reason for Disposition   MILD difficulty breathing (e.g., minimal/no SOB at rest, SOB with walking, pulse <100) and still present when not coughing (Exception: No change from usual, chronic shortness of breath.)    Additional Information   Negative: SEVERE difficulty breathing (e.g., struggling for each breath, speaks in single words)   Negative: Chest pain and difficulty breathing   Negative: Bluish (or gray) lips or face now   Negative: Passed out (i.e., lost consciousness, collapsed and was not responding)   Negative: Shock suspected (e.g., cold/pale/clammy skin, too weak to stand, low BP, rapid pulse)   Negative: Difficult to awaken or acting confused (e.g., disoriented, slurred speech)   Negative: Recent chest injury (i.e., past 24 hours)   Negative: Coughed up blood and large amount (Such as: 'a half cup of blood')   Negative: Sounds like a  "life-threatening emergency to the triager   Negative: MODERATE difficulty breathing (e.g., speaks in phrases, SOB even at rest, pulse 100-120) and still present when not coughing   Negative: Chest pain   Negative: Unclear to triager if the patient is coughing up blood or vomiting blood   Negative: History of prior 'blood clot' in leg or lungs (i.e., deep vein thrombosis, pulmonary embolism)   Negative: History of inherited increased risk of blood clots (e.g., Factor 5 Leiden, Anti-thrombin 3, Protein C or Protein S deficiency, Prothrombin mutation)   Negative: Pregnant or postpartum (from 0 to 6 weeks after delivery)   Negative: Hip or leg fracture (broken bone) in past month (or had cast on leg or ankle in past month)   Negative: Long-distance travel in past month (e.g., car, bus, train, plane; with trip lasting 6 or more hours)   Negative: Bedridden (e.g., CVA, chronic illness, recovering from surgery)   Negative: Patient sounds very sick or weak to the triager    Answer Assessment - Initial Assessment Questions  1. ONSET: about a week ago, tested positive for covid   2. SEVERITY: \"cough minimal today, only has coughed a few times today   3. SPUTUM: Denies much sputum production   4. HEMOPTYSIS: 1/2 Teaspoon of sputum with streaks of blood   5. DIFFICULTY BREATHING: Mild shortness of breath at rest, symptoms started when diagnosed with covid, improving   6. FEVER: Denies  7. CARDIAC HISTORY: Denies  8. LUNG HISTORY: Denies  9. PE RISK FACTORS: Denies  10. OTHER SYMPTOMS:   Reports: runny nose,   Denies: wheezing, chest pain  12. TRAVEL: n/a    Protocols used: Coughing Up Blood-A-OH    "

## 2024-06-11 NOTE — TELEPHONE ENCOUNTER
Huddled with Nicolas Escobedo PA-C. Okay to monitor symptoms as long as they don't persist or worsen. Pt verbalized understanding. Has coughed several times since we spoke, and sputum was clear.    No further questions at this time.    Dena Troncoso RN on 6/11/2024 at 10:17 AM

## 2024-08-19 NOTE — PROGRESS NOTES
"Discharge Summary     06/03/24 0500   Appointment Info   Signing clinician's name / credentials Aleshia Freed, PT, DPT   Visits Used 2/8   Medical Diagnosis BPPV right   PT Tx Diagnosis dizziness   Progress Note/Certification   Onset of illness/injury or Date of Surgery 05/24/24   Therapy Frequency 1-2x/wk   Predicted Duration 8 weeks   Progress Note Due Date 07/30/24   GOALS   PT Goals 2;3   PT Goal 1   Goal Identifier BPPV   Goal Description Pt will test negative for BPPV in order to safely perform bed mobiltiy and transfers without symptom interference in order to return to PLOF   Target Date 07/30/24   PT Goal 2   Goal Identifier DHI   Goal Description Pt will score 8 or less on the DHI in order to demonstrate decreased self perceived impairments due to symptoms   Target Date 07/30/24   Subjective Report   Subjective Report Feels better overall but the spinning is still there.  Feels \"buzzed\" when he is walking.  Feels like the vertigo is a little less frequent.   Treatment Interventions (PT)   Interventions Standard Canalith Repositioning;Infrared Goggle Exam or Frenzel Lense Exam   Infrared Goggle Exam or Frenzel Lense Exam   Vestibular Suppressant in Last 24 Hours? No   Exam completed with Infrared Goggles   Spontaneous Nystagmus Negative   [place]-Hallpike (Right) Upbeating R torsional  (Initally negative but retested after all other positionals and slow nystagmus observed.  Latency is 6 seconds durtation 15 seconds)   Toshia-Hallpike (Left) Downbeating  (a few intermittent downbeats)   [facility] (Right) Negative   [facility] Sup[facility] Roll Test (Left) Negative   BPPV Canal(s) R Posterior   BPPV Type Canalithasis   Standard Canalith Repositioning   Standard canalith repositioning procedure minutes (36112) 27   Canalith Repositioning - Details mod epley x2 for R PC.  Headshake added to the 3rd position of both mod epleys. No symptoms following 2nd CRM. Education on post-CRM precautions   Education   Learner/Method " Patient;Pictures/Video;Listening   Education Comments Education on diagnosis and POC   Plan   Home program none d/t BPPV   Plan for next session reassess BPPV (consider semont if nystagmus present is still slow) cont with habituation if BPPV is cleared and symptoms are still present.  If cleared teach self CRM   Total Session Time   Total Treatment Time (sum of timed and untimed services) 27         DISCHARGE  Reason for Discharge: Patient has failed to schedule further appointments.      Discharge Plan: N/A patient did not continue with PT    Referring Provider:  Referred Self

## 2024-08-30 ENCOUNTER — PATIENT OUTREACH (OUTPATIENT)
Dept: CARE COORDINATION | Facility: CLINIC | Age: 59
End: 2024-08-30
Payer: COMMERCIAL

## 2024-09-24 ENCOUNTER — THERAPY VISIT (OUTPATIENT)
Dept: PHYSICAL THERAPY | Facility: CLINIC | Age: 59
End: 2024-09-24
Payer: COMMERCIAL

## 2024-09-24 DIAGNOSIS — M54.16 LEFT LUMBAR RADICULOPATHY: Primary | ICD-10-CM

## 2024-09-24 PROCEDURE — 97110 THERAPEUTIC EXERCISES: CPT | Mod: GP | Performed by: PHYSICAL THERAPIST

## 2024-09-24 PROCEDURE — 97161 PT EVAL LOW COMPLEX 20 MIN: CPT | Mod: GP | Performed by: PHYSICAL THERAPIST

## 2024-09-24 ASSESSMENT — ACTIVITIES OF DAILY LIVING (ADL)
ABILITY_TO_PERFORM_ACTIVITY_WITH_YOUR_NORMAL_TECHNIQUE: MODERATE DIFFICULTY
ADL_TOTAL_ITEM_SCORE: INCOMPLETE
HOS_ADL_HIGHEST_POTENTIAL_SCORE: 4
SPORTS_TOTAL_ITEM_SCORE: 0
HOW_WOULD_YOU_RATE_YOUR_CURRENT_LEVEL_OF_FUNCTION?: NEARLY NORMAL
JUMPING: EXTREME DIFFICULTY
ADL_COUNT: 1
CUTTING/LATERAL_MOVEMENTS: MODERATE DIFFICULTY
SPORTS_SCORE(%): 0
LOW_IMPACT_ACTIVITIES_LIKE_FAST_WALKING: MODERATE DIFFICULTY
SWINGING_OBJECTS_LIKE_A_GOLF_CLUB: SLIGHT DIFFICULTY
HOW_WOULD_YOU_RATE_YOUR_CURRENT_LEVEL_OF_FUNCTION_DURING_YOUR_SPORTS_RELATED_ACTIVITIES_FROM_0_TO_100_WITH_100_BEING_YOUR_LEVEL_OF_FUNCTION_PRIOR_TO_YOUR_HIP_PROBLEM_AND_0_BEING_THE_INABILITY_TO_PERFORM_ANY_OF_YOUR_USUAL_DAILY_ACTIVITIES?: 50
ADL_SCORE(%): INCOMPLETE
HOW_WOULD_YOU_RATE_YOUR_CURRENT_LEVEL_OF_FUNCTION_DURING_YOUR_USUAL_ACTIVITIES_OF_DAILY_LIVING_FROM_0_TO_100_WITH_100_BEING_YOUR_LEVEL_OF_FUNCTION_PRIOR_TO_YOUR_HIP_PROBLEM_AND_0_BEING_THE_INABILITY_TO_PERFORM_ANY_OF_YOUR_USUAL_DAILY_ACTIVITIES?: 75
PLEASE_INDICATE_YOR_PRIMARY_REASON_FOR_REFERRAL_TO_THERAPY:: HIP
SPORTS_COUNT: 9
RUNNING_ONE_MILE: UNABLE TO DO
SPORTS_HIGHEST_POTENTIAL_SCORE: 36
STARTING_AND_STOPPING_QUICKLY: SLIGHT DIFFICULTY
ADL_HIGHEST_POTENTIAL_SCORE: 4
ABILITY_TO_PARTICIPATE_IN_YOUR_DESIRED_SPORT_AS_LONG_AS_YOU_WOULD_LIKE: MODERATE DIFFICULTY
STANDING_FOR_15_MINUTES: SLIGHT DIFFICULTY
HOS_ADL_ITEM_SCORE_TOTAL: 3

## 2024-09-24 NOTE — PROGRESS NOTES
PHYSICAL THERAPY EVALUATION  Type of Visit: Evaluation       Fall Risk Screen:  Fall screen completed by: PT  Have you fallen 2 or more times in the past year?: No  Have you fallen and had an injury in the past year?: No  Is patient a fall risk?: No    Subjective       Presenting condition or subjective complaint: radiating pain down left leg  Date of onset: 24    Relevant medical history: Pain at night or rest   Dates & types of surgery:      Prior diagnostic imaging/testing results:       Prior therapy history for the same diagnosis, illness or injury:          Living Environment  Social support: With a significant other or spouse   Type of home: House   Stairs to enter the home:         Ramp: No   Stairs inside the home: Yes 15 Is there a railing: Yes     Help at home: None  Equipment owned:       Employment: Yes physical therapist  Hobbies/Interests: walking, gym    Patient goals for therapy: pain free functional mobility/ambulation/exercise    Pain assessment: Pain present  Location: L low back and into left leg  /Rating: rest=1/10   worst= 8/10   Worse with standing/walking in the morning; better with sitting      Objective   LUMBAR:    Posture: lumbar support (no change, more pain in low back)    Slight left lateral shift in standing      Neurological:    Motor Deficit:  Myotomes L R   L1-2 (hip flexion) 5/5 5/5   L3 (knee extension) 5/5 5/5   L4 (ankle DF) 5/5 5/5   L5 (g. toe ext) 5/5 5/5   S1 (ankle PF or knee flex) 5/5 5/5     Sensory Deficit, Reflexes: bilaterally intact and symmetrical L1-S1    Dural Signs:   L R   Slump ++ -   SLR         AROM: (Major, Moderate, Minimal or Nil loss)  Movement Loss Fredrick Mod Min Nil Pain   Flexion   X  Some bilateral pull in hamstring   Extension   X  None, increased with return to neutral    Side Gliding  L  X      Side Gliding R   X  Pain mild on left     Repeated motions:  Side glide at wall (L shoulder on wall ) X10 - incresaed leg pain after  Prone lyin min  (no change)  BENITO: 1 min (no change)  Press ups: X10 (improved pain in lying, and improved pain with slump); X10 (improved pain in lying, but increased leg pain with slump)    Supine DKC: X10 sec X10 (increased calf symptoms on left and increased pain with slump)        Assessment & Plan   CLINICAL IMPRESSIONS  Medical Diagnosis: L lumbar radiculopathy    Treatment Diagnosis: L lumbar radiculopathy   Impression/Assessment: Patient is a 59 year old male with L leg complaints.  The following significant findings have been identified: Pain, Decreased ROM/flexibility, Decreased joint mobility, Decreased strength, Inflammation, Impaired gait, Impaired muscle performance, Decreased activity tolerance, Impaired posture, and Instability. These impairments interfere with their ability to perform self care tasks, work tasks, recreational activities, household chores, driving , and community mobility as compared to previous level of function.     Clinical Decision Making (Complexity):  Clinical Presentation: Stable/Uncomplicated  Clinical Presentation Rationale: based on medical and personal factors listed in PT evaluation  Clinical Decision Making (Complexity): Low complexity    PLAN OF CARE  Treatment Interventions:  Interventions: Manual Therapy, Neuromuscular Re-education, Therapeutic Activity, Therapeutic Exercise    Long Term Goals     PT Goal 1  Goal Identifier: Goal 1  Goal Description: Pt will be able to sit for 30 min, painfree  Rationale: to maximize safety and independence with performance of ADLs and functional tasks  Target Date: 11/19/24  PT Goal 2  Goal Identifier: Goal 2  Goal Description: Pt will be able to sleep throughout the night without waking due to pain  Rationale: to maximize safety and independence with performance of ADLs and functional tasks  Target Date: 11/19/24      Frequency of Treatment: 1X/week  Duration of Treatment: 8 weeks      Education Assessment:   Learner/Method:  Patient;Pictures/Video    Risks and benefits of evaluation/treatment have been explained.   Patient/Family/caregiver agrees with Plan of Care.     Evaluation Time:     PT Farrah, Low Complexity Minutes (22386): 15       Signing Clinician: Nery Fox PT

## 2024-09-30 ENCOUNTER — THERAPY VISIT (OUTPATIENT)
Dept: PHYSICAL THERAPY | Facility: CLINIC | Age: 59
End: 2024-09-30
Payer: COMMERCIAL

## 2024-09-30 DIAGNOSIS — M54.16 LEFT LUMBAR RADICULOPATHY: Primary | ICD-10-CM

## 2024-09-30 PROCEDURE — 97110 THERAPEUTIC EXERCISES: CPT | Mod: GP | Performed by: PHYSICAL THERAPIST

## 2024-10-08 SDOH — HEALTH STABILITY: PHYSICAL HEALTH: ON AVERAGE, HOW MANY DAYS PER WEEK DO YOU ENGAGE IN MODERATE TO STRENUOUS EXERCISE (LIKE A BRISK WALK)?: 4 DAYS

## 2024-10-08 SDOH — HEALTH STABILITY: PHYSICAL HEALTH: ON AVERAGE, HOW MANY MINUTES DO YOU ENGAGE IN EXERCISE AT THIS LEVEL?: 30 MIN

## 2024-10-08 ASSESSMENT — SOCIAL DETERMINANTS OF HEALTH (SDOH): HOW OFTEN DO YOU GET TOGETHER WITH FRIENDS OR RELATIVES?: TWICE A WEEK

## 2024-10-10 ENCOUNTER — OFFICE VISIT (OUTPATIENT)
Dept: FAMILY MEDICINE | Facility: CLINIC | Age: 59
End: 2024-10-10
Attending: PHYSICIAN ASSISTANT
Payer: COMMERCIAL

## 2024-10-10 VITALS
TEMPERATURE: 97.8 F | SYSTOLIC BLOOD PRESSURE: 134 MMHG | BODY MASS INDEX: 27.28 KG/M2 | RESPIRATION RATE: 16 BRPM | DIASTOLIC BLOOD PRESSURE: 78 MMHG | OXYGEN SATURATION: 99 % | HEART RATE: 79 BPM | WEIGHT: 180 LBS | HEIGHT: 68 IN

## 2024-10-10 DIAGNOSIS — R73.03 PRE-DIABETES: ICD-10-CM

## 2024-10-10 DIAGNOSIS — R97.20 ELEVATED PROSTATE SPECIFIC ANTIGEN (PSA): ICD-10-CM

## 2024-10-10 DIAGNOSIS — M54.10 RADICULAR SYNDROME OF LEFT LEG: ICD-10-CM

## 2024-10-10 DIAGNOSIS — Z13.6 CARDIOVASCULAR SCREENING; LDL GOAL LESS THAN 160: ICD-10-CM

## 2024-10-10 DIAGNOSIS — Z00.00 ROUTINE GENERAL MEDICAL EXAMINATION AT A HEALTH CARE FACILITY: Primary | ICD-10-CM

## 2024-10-10 LAB
ANION GAP SERPL CALCULATED.3IONS-SCNC: 11 MMOL/L (ref 7–15)
BUN SERPL-MCNC: 20 MG/DL (ref 8–23)
CALCIUM SERPL-MCNC: 8.7 MG/DL (ref 8.8–10.4)
CHLORIDE SERPL-SCNC: 102 MMOL/L (ref 98–107)
CHOLEST SERPL-MCNC: 188 MG/DL
CREAT SERPL-MCNC: 0.91 MG/DL (ref 0.67–1.17)
EGFRCR SERPLBLD CKD-EPI 2021: >90 ML/MIN/1.73M2
EST. AVERAGE GLUCOSE BLD GHB EST-MCNC: 105 MG/DL
FASTING STATUS PATIENT QL REPORTED: YES
FASTING STATUS PATIENT QL REPORTED: YES
GLUCOSE SERPL-MCNC: 107 MG/DL (ref 70–99)
HBA1C MFR BLD: 5.3 % (ref 0–5.6)
HCO3 SERPL-SCNC: 25 MMOL/L (ref 22–29)
HDLC SERPL-MCNC: 47 MG/DL
LDLC SERPL CALC-MCNC: 114 MG/DL
NONHDLC SERPL-MCNC: 141 MG/DL
POTASSIUM SERPL-SCNC: 4.2 MMOL/L (ref 3.4–5.3)
PSA SERPL DL<=0.01 NG/ML-MCNC: 5.97 NG/ML (ref 0–3.5)
SODIUM SERPL-SCNC: 138 MMOL/L (ref 135–145)
TRIGL SERPL-MCNC: 133 MG/DL

## 2024-10-10 PROCEDURE — 80048 BASIC METABOLIC PNL TOTAL CA: CPT | Performed by: PHYSICIAN ASSISTANT

## 2024-10-10 PROCEDURE — 80061 LIPID PANEL: CPT | Performed by: PHYSICIAN ASSISTANT

## 2024-10-10 PROCEDURE — 83036 HEMOGLOBIN GLYCOSYLATED A1C: CPT | Performed by: PHYSICIAN ASSISTANT

## 2024-10-10 PROCEDURE — 84153 ASSAY OF PSA TOTAL: CPT | Performed by: PHYSICIAN ASSISTANT

## 2024-10-10 PROCEDURE — 90471 IMMUNIZATION ADMIN: CPT | Performed by: PHYSICIAN ASSISTANT

## 2024-10-10 PROCEDURE — 99396 PREV VISIT EST AGE 40-64: CPT | Mod: 25 | Performed by: PHYSICIAN ASSISTANT

## 2024-10-10 PROCEDURE — 90746 HEPB VACCINE 3 DOSE ADULT IM: CPT | Performed by: PHYSICIAN ASSISTANT

## 2024-10-10 PROCEDURE — 99213 OFFICE O/P EST LOW 20 MIN: CPT | Mod: 25 | Performed by: PHYSICIAN ASSISTANT

## 2024-10-10 PROCEDURE — 36415 COLL VENOUS BLD VENIPUNCTURE: CPT | Performed by: PHYSICIAN ASSISTANT

## 2024-10-10 RX ORDER — PREDNISONE 20 MG/1
40 TABLET ORAL DAILY
Qty: 10 TABLET | Refills: 0 | Status: SHIPPED | OUTPATIENT
Start: 2024-10-10

## 2024-10-10 ASSESSMENT — ANXIETY QUESTIONNAIRES
1. FEELING NERVOUS, ANXIOUS, OR ON EDGE: NOT AT ALL
4. TROUBLE RELAXING: NOT AT ALL
GAD7 TOTAL SCORE: 1
6. BECOMING EASILY ANNOYED OR IRRITABLE: NOT AT ALL
7. FEELING AFRAID AS IF SOMETHING AWFUL MIGHT HAPPEN: NOT AT ALL
2. NOT BEING ABLE TO STOP OR CONTROL WORRYING: NOT AT ALL
IF YOU CHECKED OFF ANY PROBLEMS ON THIS QUESTIONNAIRE, HOW DIFFICULT HAVE THESE PROBLEMS MADE IT FOR YOU TO DO YOUR WORK, TAKE CARE OF THINGS AT HOME, OR GET ALONG WITH OTHER PEOPLE: NOT DIFFICULT AT ALL
GAD7 TOTAL SCORE: 1
GAD7 TOTAL SCORE: 1
7. FEELING AFRAID AS IF SOMETHING AWFUL MIGHT HAPPEN: NOT AT ALL
8. IF YOU CHECKED OFF ANY PROBLEMS, HOW DIFFICULT HAVE THESE MADE IT FOR YOU TO DO YOUR WORK, TAKE CARE OF THINGS AT HOME, OR GET ALONG WITH OTHER PEOPLE?: NOT DIFFICULT AT ALL
5. BEING SO RESTLESS THAT IT IS HARD TO SIT STILL: SEVERAL DAYS
3. WORRYING TOO MUCH ABOUT DIFFERENT THINGS: NOT AT ALL

## 2024-10-10 ASSESSMENT — PAIN SCALES - GENERAL: PAINLEVEL: MILD PAIN (2)

## 2024-10-10 NOTE — PROGRESS NOTES
"hipPreventive Care Visit  [facility] AMY Escobedo PA-C, Family Medicine  Oct 10, 2024      Assessment & Plan     Routine general medical examination at a health care facility  Reviewed personal and family history. Reviewed age appropriate screenings. Recommended any needed vaccinations.  - HEPATITIS B, ADULT 20+ (ENGERIX-B/RECOMBIVAX HB)  - Lipid panel reflex to direct LDL Fasting; Future    Pre-diabetes  Updating today; prior to this leg symptoms worsening he was using the gym regularly so hopefully this is stable.   - Basic metabolic panel  (Ca, Cl, CO2, Creat, Gluc, K, Na, BUN); Future  - Hemoglobin A1c; Future    Radicular syndrome of left leg  Worsening despite physical therapy; he can try below but should also keep appt with specialty  - predniSONE (DELTASONE) 20 MG tablet; Take 2 tablets (40 mg) by mouth daily.    Elevated prostate specific antigen (PSA)  Following with Dr. Krish vaughn to update PSA today. He has stopped flomax as he did not see any benefit    CARDIOVASCULAR SCREENING; LDL GOAL LESS THAN 160  updating          BMI  Estimated body mass index is 27.78 kg/m  as calculated from the following:    Height as of this encounter: 1.715 m (5' 7.5\").    Weight as of this encounter: 81.6 kg (180 lb).       Counseling  Appropriate preventive services were addressed with this patient via screening, questionnaire, or discussion as appropriate for fall prevention, nutrition, physical activity, Tobacco-use cessation, social engagement, weight loss and cognition.  Checklist reviewing preventive services available has been given to the patient.  Reviewed patient's diet, addressing concerns and/or questions.         Ashvin Raza is a 59 year old, presenting for the following:  Physical and Hip Pain        10/10/2024     8:34 AM   Additional Questions   Roomed by neli HENDERSON  Left hip pain, is in PT.   He has been going to [employer] quite regularly and knee pain he's " had prior has improved  Over the past month the left hip has gotten bad  Physical therapy has not been overly helpful; in fact possibly getting worse with physical therapy   Can radiate down the leg on occasion, laterally  Laying on that side will not change anything  Would like a steroid to calm pain down.        10/8/2024   General Health   How would you rate your overall physical health? Good   Feel stress (tense, anxious, or unable to sleep) Not at all            10/8/2024   Nutrition   Three or more servings of calcium each day? (!) I DON'T KNOW   Diet: Regular (no restrictions)   How many servings of fruit and vegetables per day? (!) 0-1   How many sweetened beverages each day? 0-1            10/8/2024   Exercise   Days per week of moderate/strenous exercise 4 days   Average minutes spent exercising at this level 30 min            10/8/2024   Social Factors   Frequency of gathering with friends or relatives Twice a week   Worry food won't last until get money to buy more No   Food not last or not have enough money for food? No   Do you have housing? (Housing is defined as stable permanent housing and does not include staying ouside in a car, in a tent, in an abandoned building, in an overnight shelter, or couch-surfing.) Yes   Are you worried about losing your housing? No   Lack of transportation? No   Unable to get utilities (heat,electricity)? No            10/8/2024   Fall Risk   Fallen 2 or more times in the past year? No   Trouble with walking or balance? No             10/8/2024   Dental   Dentist two times every year? Yes            10/8/2024   TB Screening   Were you born outside of the [place]? No          Today's PHQ-9 Score:       10/10/2024     8:32 AM   PHQ-9 SCORE   PHQ-9 Total Score MyChart 2 (Minimal depression)   PHQ-9 Total Score 2         10/8/2024   Substance Use   Alcohol more than 3/day or more than 7/wk No   Do you use any other substances recreationally? No        Social History     Tobacco  "Use    Smoking status: Never     Passive exposure: Never    Smokeless tobacco: Never   Vaping Use    Vaping status: Never Used   Substance Use Topics    Alcohol use: Yes     Alcohol/week: 1.0 standard drink of alcohol     Comment: Minimal, social    Drug use: No           10/8/2024   STI Screening   New sexual partner(s) since last STI/HIV test? No      Last PSA:   PSA   Date Value Ref Range Status   09/13/2017 3.18 0 - 4 ug/L Final     Comment:     Assay Method:  Chemiluminescence using Siemens Vista analyzer     Prostate Specific Antigen Screen   Date Value Ref Range Status   12/20/2021 4.96 (H) 0.00 - 4.00 ug/L Final     PSA Tumor Marker   Date Value Ref Range Status   11/21/2023 5.33 (H) 0.00 - 3.50 ng/mL Final     ASCVD Risk   The 10-year ASCVD risk score (Irma SCHULTE, et al., 2019) is: 8.6%    Values used to calculate the score:      Age: 59 years      Sex: Male      Is Non- : No      Diabetic: No      Tobacco smoker: No      Systolic Blood Pressure: 134 mmHg      Is BP treated: No      HDL Cholesterol: 40 mg/dL      Total Cholesterol: 168 mg/dL           Reviewed and updated as needed this visit by Provider                    Lab work is in process  Labs reviewed in EPIC      Review of Systems  Constitutional, HEENT, cardiovascular, pulmonary, gi and gu systems are negative, except as otherwise noted.     Objective    Exam  /78   Pulse 79   Temp 97.8  F (36.6  C)   Resp 16   Ht 1.715 m (5' 7.5\")   Wt 81.6 kg (180 lb)   SpO2 99%   BMI 27.78 kg/m     Estimated body mass index is 27.78 kg/m  as calculated from the following:    Height as of this encounter: 1.715 m (5' 7.5\").    Weight as of this encounter: 81.6 kg (180 lb).    Physical Exam  GENERAL: alert and no distress  EYES: Eyes grossly normal to inspection, PERRL and conjunctivae and sclerae normal  HENT: ear canals and TM's normal, nose and mouth without ulcers or lesions  NECK: no adenopathy, no asymmetry, " masses, or scars  RESP: lungs clear to auscultation - no rales, rhonchi or wheezes  CV: regular rate and rhythm, normal S1 S2, no S3 or S4, no murmur, click or rub, no peripheral edema  ABDOMEN: soft, nontender, no hepatosplenomegaly, no masses and bowel sounds normal  MS: there is no ttp over the left trochanter;   BACK: +left SLR; extension improves, flexion worsens radicular pain  PSYCH: mentation appears normal, affect normal/bright        Signed Electronically by: Nicolas Escobedo PA-C

## 2024-10-10 NOTE — PATIENT INSTRUCTIONS
Patient Education   Preventive Care Advice   This is general advice given by our system to help you stay healthy. However, your care team may have specific advice just for you. Please talk to your care team about your preventive care needs.  Nutrition  Eat 5 or more servings of fruits and vegetables each day.  Try wheat bread, brown rice and whole grain pasta (instead of white bread, rice, and pasta).  Get enough calcium and vitamin D. Check the label on foods and aim for 100% of the RDA (recommended daily allowance).  Lifestyle  Exercise at least 150 minutes each week  (30 minutes a day, 5 days a week).  Do muscle strengthening activities 2 days a week. These help control your weight and prevent disease.  No smoking.  Wear sunscreen to prevent skin cancer.  Have a dental exam and cleaning every 6 months.  Yearly exams  See your health care team every year to talk about:  Any changes in your health.  Any medicines your care team has prescribed.  Preventive care, family planning, and ways to prevent chronic diseases.  Shots (vaccines)   HPV shots (up to age 26), if you've never had them before.  Hepatitis B shots (up to age 59), if you've never had them before.  COVID-19 shot: Get this shot when it's due.  Flu shot: Get a flu shot every year.  Tetanus shot: Get a tetanus shot every 10 years.  Pneumococcal, hepatitis A, and RSV shots: Ask your care team if you need these based on your risk.  Shingles shot (for age 50 and up)  General health tests  Diabetes screening:  Starting at age 35, Get screened for diabetes at least every 3 years.  If you are younger than age 35, ask your care team if you should be screened for diabetes.  Cholesterol test: At age 39, start having a cholesterol test every 5 years, or more often if advised.  Bone density scan (DEXA): At age 50, ask your care team if you should have this scan for osteoporosis (brittle bones).  Hepatitis C: Get tested at least once in your life.  STIs (sexually  transmitted infections)  Before age 24: Ask your care team if you should be screened for STIs.  After age 24: Get screened for STIs if you're at risk. You are at risk for STIs (including HIV) if:  You are sexually active with more than one person.  You don't use condoms every time.  You or a partner was diagnosed with a sexually transmitted infection.  If you are at risk for HIV, ask about PrEP medicine to prevent HIV.  Get tested for HIV at least once in your life, whether you are at risk for HIV or not.  Cancer screening tests  Cervical cancer screening: If you have a cervix, begin getting regular cervical cancer screening tests starting at age 21.  Breast cancer scan (mammogram): If you've ever had breasts, begin having regular mammograms starting at age 40. This is a scan to check for breast cancer.  Colon cancer screening: It is important to start screening for colon cancer at age 45.  Have a colonoscopy test every 10 years (or more often if you're at risk) Or, ask your provider about stool tests like a FIT test every year or Cologuard test every 3 years.  To learn more about your testing options, visit:   .  For help making a decision, visit:   https://bit.ly/es43425.  Prostate cancer screening test: If you have a prostate, ask your care team if a prostate cancer screening test (PSA) at age 55 is right for you.  Lung cancer screening: If you are a current or former smoker ages 50 to 80, ask your care team if ongoing lung cancer screenings are right for you.  For informational purposes only. Not to replace the advice of your health care provider. Copyright   2023 [employer]. All rights reserved. Clinically reviewed by the [facility] Transitions Program. My[employer] [id number] - REV 01/24.

## 2024-10-15 ENCOUNTER — THERAPY VISIT (OUTPATIENT)
Dept: PHYSICAL THERAPY | Facility: CLINIC | Age: 59
End: 2024-10-15
Payer: COMMERCIAL

## 2024-10-15 DIAGNOSIS — M54.16 LEFT LUMBAR RADICULOPATHY: Primary | ICD-10-CM

## 2024-10-15 PROCEDURE — 97110 THERAPEUTIC EXERCISES: CPT | Mod: GP | Performed by: PHYSICAL THERAPIST

## 2024-10-15 NOTE — PROGRESS NOTES
Overall Ry has had no change in his symptoms in the first 2 visits of PT. I screened his hip and SI joint today and ruled both out as pain generators. I am confident his left leg pain is coming from his low back. Seeing Dr. Yeo tomorrow for further evaluation. Switched up his PT home program today, in hopes to relieve his leg pain.    PLAN  Continue therapy per current plan of care.   10/15/24 0500   Appointment Info   Signing clinician's name / credentials Nery Fox, PT, SCS   Total/Authorized Visits E&T   Visits Used 3   Medical Diagnosis L lumbar radiculopathy   PT Tx Diagnosis L lumbar radiculopathy   Progress Note/Certification   Onset of illness/injury or Date of Surgery 07/24/24   Therapy Frequency 1X/week   Predicted Duration 8 weeks   Progress Note Due Date 11/19/24   GOALS   PT Goals 2   PT Goal 1   Goal Identifier Goal 1   Goal Description Pt will be able to sit for 30 min, painfree   Rationale to maximize safety and independence with performance of ADLs and functional tasks   Target Date 11/19/24   PT Goal 2   Goal Identifier Goal 2   Goal Description Pt will be able to sleep throughout the night without waking due to pain   Rationale to maximize safety and independence with performance of ADLs and functional tasks   Target Date 11/19/24   Subjective Report   Subjective Report Pt notes back pain was better for one day, then got worse the next few days, so stopped flexion-rotation stretch. Saw primary care, and given prednisone pack, which did not help with pain at all. Went to gym and did elliptical which felt good.   Objective Measures   Objective Measures Objective Measure 1;Objective Measure 2;Objective Measure 3;Objective Measure 4;Objective Measure 5   Objective Measure 1   Objective Measure AROM Lumbar   Details flexion= min loss (creates L leg pain); Extension=min loss (better end range, pain procoked coming back to neutral); L SG= min (feels good);  R SG=min (no change)   Objective Measure 2    Objective Measure posture correction with lumbar roll   Details slightly worse   Objective Measure 3   Objective Measure SIJ tests   Details - compression/distraction; - thigh thrust; - ASLR   Objective Measure 4   Objective Measure Hip PROM   Details full bilaterally (no pain)   Objective Measure 5   Objective Measure Slump   Details ++ on left   Treatment Interventions (PT)   Interventions Therapeutic Procedure/Exercise   Therapeutic Procedure/Exercise   Therapeutic Procedures: strength, endurance, ROM, flexibility minutes (48264) 40   Therapeutic Procedures Ther Proc 2;Ther Proc 3;Ther Proc 4   Ther Proc 1 Pt education   Ther Proc 1 - Details okay to go to gym - elliptical and light machines   Ther Proc 2 SG at wall (R shoulder on wall)   Ther Proc 2 - Details X10 w/standing extension (no change); X10 with press ups hips shifted (improved slump)   Therapeutic Activity   PTRx Ther Act 1 Icing   PTRx Ther Act 1 - Details No Notes   Education   Learner/Method Patient;Pictures/Video   Plan   Home program See PTRx   Plan for next session re assess and progress as tolerated   Total Session Time   Timed Code Treatment Minutes 40   Total Treatment Time (sum of timed and untimed services) 40         Beginning/End Dates of Progress Note Reporting Period:    to 10/15/2024    Referring Provider:  Referred Self

## 2024-10-16 ENCOUNTER — OFFICE VISIT (OUTPATIENT)
Dept: ORTHOPEDICS | Facility: CLINIC | Age: 59
End: 2024-10-16
Payer: COMMERCIAL

## 2024-10-16 VITALS
DIASTOLIC BLOOD PRESSURE: 80 MMHG | SYSTOLIC BLOOD PRESSURE: 130 MMHG | BODY MASS INDEX: 27.92 KG/M2 | HEIGHT: 68 IN | HEART RATE: 85 BPM | WEIGHT: 184.2 LBS

## 2024-10-16 DIAGNOSIS — M51.361 DEGENERATION OF INTERVERTEBRAL DISC OF LUMBAR REGION WITH LOWER EXTREMITY PAIN: ICD-10-CM

## 2024-10-16 DIAGNOSIS — M25.551 CHRONIC PAIN OF BOTH HIPS: Primary | ICD-10-CM

## 2024-10-16 DIAGNOSIS — M54.32 SCIATICA OF LEFT SIDE: ICD-10-CM

## 2024-10-16 DIAGNOSIS — M25.552 CHRONIC PAIN OF BOTH HIPS: Primary | ICD-10-CM

## 2024-10-16 DIAGNOSIS — G89.29 CHRONIC PAIN OF BOTH HIPS: Primary | ICD-10-CM

## 2024-10-16 PROCEDURE — 99204 OFFICE O/P NEW MOD 45 MIN: CPT | Performed by: FAMILY MEDICINE

## 2024-10-16 NOTE — LETTER
10/16/2024      Castillo Allen  [address]  [place] [address]      Dear Colleague,    Thank you for referring your patient, Castillo Allen, to the [facility] [place]. Please see a copy of my visit note below.    ASSESSMENT & PLAN  Patient Instructions     1. Chronic pain of both hips    2. Sciatica of left side    3. Degeneration of intervertebral disc of lumbar region with lower extremity pain      -Patient has chronic bilateral hip both left-sided sciatica symptoms likely due to lumbar degenerative disc disease causing nerve root impingement  -Physical examination of bilateral hips today does not reproduce any symptoms.  -Patient has positive straight leg and femoral stretch test on exam today.  -Patient will get an MRI of the lumbar spine for further evaluation  - Your MRI has been ordered. You may call [phone] to schedule over the phone.  Please call my office schedule follow-up video visit 2 days after your MRI is complete to discuss results and next of treatment options.  -Continue with home exercises  -Call direct clinic number [[phone]] at any time with questions or concerns.    Albert Yeo MD [facility]  [facility]        -----    SUBJECTIVE  Castillo Allen is a/an 59 year old male who is seen as a self referral for evaluation of leg and back pain. The patient is seen by themselves.    Onset: 3 years(s) ago. Reports insidious onset without acute precipitating event.  Location of Pain: left hip and lateral lower leg  Rating of Pain at worst: 8/10  Rating of Pain Currently: 3/10  Worsened by: morning time is bad when weight berring   Better with: less more active  Treatments tried: rest/activity avoidance, Tylenol, Aleve, other medications:   Prednisone (Medrol), and physical therapy (3 visits)  Quality: aching, sharp, stabbing, cramping, shooting pricking, radiating  Red flags: Weakness: No,  bowel/bladder loss: No, foot drop: No  Associated symptoms: tingling  Orthopedic history: NO  Relevant surgical history: NO  Social history: social history: works at physical therapist    Past Medical History:   Diagnosis Date     Allergic rhinitis, cause unspecified      Depressive disorder     Situational at times     Hypertension 11/01/2020     Osteoarthritis of right knee, unspecified osteoarthritis type 11/9/2023     Social History     Socioeconomic History     Marital status:    Tobacco Use     Smoking status: Never     Passive exposure: Never     Smokeless tobacco: Never   Vaping Use     Vaping status: Never Used   Substance and Sexual Activity     Alcohol use: Yes     Alcohol/week: 1.0 standard drink of alcohol     Comment: Minimal, social     Drug use: No     Sexual activity: Yes     Partners: Female     Birth control/protection: Male Surgical     Comment: Vasectomy   Other Topics Concern     Parent/sibling w/ CABG, MI or angioplasty before 65F 55M? No     Social Determinants of Health     Financial Resource Strain: Low Risk  (10/8/2024)    Financial Resource Strain      Within the past 12 months, have you or your family members you live with been unable to get utilities (heat, electricity) when it was really needed?: No   Food Insecurity: Low Risk  (10/8/2024)    Food Insecurity      Within the past 12 months, did you worry that your food would run out before you got money to buy more?: No      Within the past 12 months, did the food you bought just not last and you didn t have money to get more?: No   Transportation Needs: Low Risk  (10/8/2024)    Transportation Needs      Within the past 12 months, has lack of transportation kept you from medical appointments, getting your medicines, non-medical meetings or appointments, work, or from getting things that you need?: No   Physical Activity: Insufficiently Active (10/8/2024)    Exercise Vital Sign      Days of Exercise per Week: 4 days      Minutes  "of Exercise per Session: 30 min   Stress: No Stress Concern Present (10/8/2024)    [employer] [place] [employer] - Occupational Stress Questionnaire      Feeling of Stress : Not at all   Social Connections: Unknown (10/8/2024)    Social Connection and Isolation Panel [NHANES]      Frequency of Social Gatherings with Friends and Family: Twice a week   Interpersonal Safety: Low Risk  (10/10/2024)    Interpersonal Safety      Do you feel physically and emotionally safe where you currently live?: Yes      Within the past 12 months, have you been hit, slapped, kicked or otherwise physically hurt by someone?: No      Within the past 12 months, have you been humiliated or emotionally abused in other ways by your partner or ex-partner?: No   Housing Stability: Low Risk  (10/8/2024)    Housing Stability      Do you have housing? : Yes      Are you worried about losing your housing?: No         Patient's past medical, surgical, social, and family histories were reviewed today and no changes are noted.    REVIEW OF SYSTEMS:  10 point ROS is negative other than symptoms noted above in HPI, Past Medical History or as stated below  Constitutional: NEGATIVE for fever, chills, change in weight  Skin: NEGATIVE for worrisome rashes, moles or lesions  GI/: NEGATIVE for bowel or bladder changes  Neuro: NEGATIVE for weakness, dizziness or paresthesias    OBJECTIVE:  /80   Pulse 85   Ht 1.727 m (5' 8\")   Wt 83.6 kg (184 lb 3.2 oz)   BMI 28.01 kg/m     General: healthy, alert and in no distress  HEENT: no scleral icterus or conjunctival erythema  Skin: no suspicious lesions or rash. No jaundice.  CV: no pedal edema  Resp: normal respiratory effort without conversational dyspnea   Psych: normal mood and affect  Gait: normal steady gait with appropriate coordination and balance  Neuro: normal light touch sensory exam of the bilateral lower extremities.  DTR's 2+ patella and achilles bilaterally.  MSK:  THORACIC/LUMBAR " SPINE  Inspection:    No gross deformity/asymmetry  Palpation:  landmarks are nontender.  Range of Motion:     Lumbar flexion within normal limits    Lumbar extension within normal limits  Strength:    Full strength throughout hips/quads/hamstrings  Special Tests:    Positive: straight leg raise (left), femoral stretch test (bilateral)    Negative: straight leg raise (right)    LEFT HIP  Inspection:    No obvious deformity or asymmetry, pelvis level  Palpation:  landmarks are nontender.  Active Range of Motion:     Flexion within normal limits, IR within normal limits, ER  within normal limits  Strength:    Flexion grossly intact, adduction grossly intact, abduction grossly intact  Special Tests:    Positive: none    Negative: Logroll, resisted gluteus medius provocation, REGINA, anterior impingement (FADIR), posterior impingement (EX/AB/ER)    Independent visualization of the below image:  No results found for this or any previous visit (from the past 24 hour(s)).    Narrative & Impression   XR LUMBAR SPINE TWO-THREE VIEWS  10/2/2023 9:17 AM      HISTORY: Radicular pattern symptoms, left leg possibly L5?; Radicular  syndrome of left leg     COMPARISON: None.                                                                       IMPRESSION: Five lumbar-type vertebral body numbering convention. Mild  loss of intervertebral disc height at L5-S1. Otherwise preserved  intervertebral disc spaces. Preserved vertebral body alignment and  vertebral body heights given patient age. Otherwise no substantial  degenerative change.     BENJAMIN LEVY, DO Albert Yeo MD [facility]      Again, thank you for allowing me to participate in the care of your patient.        Sincerely,        Albert Yeo, MD

## 2024-10-16 NOTE — PATIENT INSTRUCTIONS
1. Chronic pain of both hips    2. Sciatica of left side    3. Degeneration of intervertebral disc of lumbar region with lower extremity pain      -Patient has chronic bilateral hip both left-sided sciatica symptoms likely due to lumbar degenerative disc disease causing nerve root impingement  -Physical examination of bilateral hips today does not reproduce any symptoms.  -Patient has positive straight leg and femoral stretch test on exam today.  -Patient will get an MRI of the lumbar spine for further evaluation  - Your MRI has been ordered. You may call [phone] to schedule over the phone.  Please call my office schedule follow-up video visit 2 days after your MRI is complete to discuss results and next of treatment options.  -Continue with home exercises  -Call direct clinic number [[phone]] at any time with questions or concerns.    Albert Yeo MD CAM  [facility]  [facility] [place]

## 2024-10-22 ENCOUNTER — THERAPY VISIT (OUTPATIENT)
Dept: PHYSICAL THERAPY | Facility: CLINIC | Age: 59
End: 2024-10-22
Payer: COMMERCIAL

## 2024-10-22 DIAGNOSIS — M54.16 LEFT LUMBAR RADICULOPATHY: Primary | ICD-10-CM

## 2024-10-22 PROCEDURE — 97140 MANUAL THERAPY 1/> REGIONS: CPT | Mod: GP | Performed by: PHYSICAL THERAPIST

## 2024-10-22 PROCEDURE — 97110 THERAPEUTIC EXERCISES: CPT | Mod: GP | Performed by: PHYSICAL THERAPIST

## 2024-10-29 ENCOUNTER — HOSPITAL ENCOUNTER (OUTPATIENT)
Dept: MRI IMAGING | Facility: CLINIC | Age: 59
Discharge: HOME | End: 2024-10-29
Attending: FAMILY MEDICINE | Admitting: FAMILY MEDICINE
Payer: COMMERCIAL

## 2024-10-29 DIAGNOSIS — M51.361 DEGENERATION OF INTERVERTEBRAL DISC OF LUMBAR REGION WITH LOWER EXTREMITY PAIN: ICD-10-CM

## 2024-10-29 DIAGNOSIS — M54.32 SCIATICA OF LEFT SIDE: ICD-10-CM

## 2024-10-29 PROCEDURE — 72148 MRI LUMBAR SPINE W/O DYE: CPT

## 2024-11-04 PROBLEM — M54.16 LEFT LUMBAR RADICULOPATHY: Status: RESOLVED | Noted: 2024-09-24 | Resolved: 2024-11-04

## 2024-11-04 NOTE — PROGRESS NOTES
Patient did not return for further treatment and no additional progress was noted.  Please refer to the progress/soap note and goal flowsheet completed for discharge information.

## 2024-11-05 ENCOUNTER — OFFICE VISIT (OUTPATIENT)
Dept: ORTHOPEDICS | Facility: CLINIC | Age: 59
End: 2024-11-05
Payer: COMMERCIAL

## 2024-11-05 ENCOUNTER — THERAPY VISIT (OUTPATIENT)
Dept: PHYSICAL THERAPY | Facility: CLINIC | Age: 59
End: 2024-11-05
Payer: COMMERCIAL

## 2024-11-05 VITALS
BODY MASS INDEX: 27.89 KG/M2 | WEIGHT: 184 LBS | DIASTOLIC BLOOD PRESSURE: 91 MMHG | HEIGHT: 68 IN | SYSTOLIC BLOOD PRESSURE: 161 MMHG

## 2024-11-05 DIAGNOSIS — M54.16 LEFT LUMBAR RADICULOPATHY: Primary | ICD-10-CM

## 2024-11-05 DIAGNOSIS — M48.061 SPINAL STENOSIS OF LUMBAR REGION, UNSPECIFIED WHETHER NEUROGENIC CLAUDICATION PRESENT: ICD-10-CM

## 2024-11-05 DIAGNOSIS — M51.361 DEGENERATION OF INTERVERTEBRAL DISC OF LUMBAR REGION WITH LOWER EXTREMITY PAIN: ICD-10-CM

## 2024-11-05 DIAGNOSIS — M54.32 SCIATICA OF LEFT SIDE: Primary | ICD-10-CM

## 2024-11-05 PROCEDURE — 97110 THERAPEUTIC EXERCISES: CPT | Mod: GP | Performed by: PHYSICAL THERAPIST

## 2024-11-05 PROCEDURE — 99214 OFFICE O/P EST MOD 30 MIN: CPT | Performed by: FAMILY MEDICINE

## 2024-11-05 PROCEDURE — G2211 COMPLEX E/M VISIT ADD ON: HCPCS | Performed by: FAMILY MEDICINE

## 2024-11-05 RX ORDER — GABAPENTIN 300 MG/1
600 CAPSULE ORAL 2 TIMES DAILY
Qty: 60 CAPSULE | Refills: 0 | Status: SHIPPED | OUTPATIENT
Start: 2024-11-05

## 2024-11-05 NOTE — PATIENT INSTRUCTIONS
1. Sciatica of left side    2. Degeneration of intervertebral disc of lumbar region with lower extremity pain    3. Spinal stenosis of lumbar region, unspecified whether neurogenic claudication present      -Patient is following up for left-sided low back pain with sciatica symptoms due to multilevel bulging disks, arthritis causing lumbar stenosis.  -MRI results of the lumbar spine reviewed today which showed multilevel facet arthritis, bulging intervertebral discs causing L4 middle and central canal stenosis.  MRI findings were discussed with the patient and all questions were answered  -Patient referred to pain management for lumbar epidural steroid injections.  -Patient will also start gabapentin 300 mg initially at nighttime.  Patient may increase to 600 mg at nighttime if symptoms not improve and if patient is not experiencing side effects.  Patient may then add a 300 mg morning dose if daytime symptoms do not improve and eventually may increase to 600 mg at bedtime and 600 mg in the morning.  Patient will call us once he finds a stable dose that controls symptoms or if we need further titration  -Patient will continue to follow-up with me for further treatment recommendations  -Call direct clinic number [[phone]] at any time with questions or concerns.    Albert Yeo MD [facility]  [facility] [place]

## 2024-11-05 NOTE — PROGRESS NOTES
ASSESSMENT & PLAN  Patient Instructions     1. Sciatica of left side    2. Degeneration of intervertebral disc of lumbar region with lower extremity pain    3. Spinal stenosis of lumbar region, unspecified whether neurogenic claudication present      -Patient is following up for left-sided low back pain with sciatica symptoms due to multilevel bulging disks, arthritis causing lumbar stenosis.  -MRI results of the lumbar spine reviewed today which showed multilevel facet arthritis, bulging intervertebral discs causing L4 middle and central canal stenosis.  MRI findings were discussed with the patient and all questions were answered  -Patient referred to pain management for lumbar epidural steroid injections.  -Patient will also start gabapentin 300 mg initially at nighttime.  Patient may increase to 600 mg at nighttime if symptoms not improve and if patient is not experiencing side effects.  Patient may then add a 300 mg morning dose if daytime symptoms do not improve and eventually may increase to 600 mg at bedtime and 600 mg in the morning.  Patient will call us once he finds a stable dose that controls symptoms or if we need further titration  -Patient will continue to follow-up with me for further treatment recommendations  -Call direct clinic number [[phone]] at any time with questions or concerns.    Albert Yeo MD [facility]  [facility]        -----    SUBJECTIVE:  Castillo Allen is a 59 year old male who is seen in follow-up for low back pain.They were last seen 10/16/2024.     Since their last visit reports worsening pain. They indicate that their current pain level is 5/10. They have tried rest/activity avoidance, Tylenol, Aleve, other medications: Diclofenac and Prednisone (Medrol), physical therapy, MRI (10/29/24), and home exercises.      The patient is seen by themselves.    Patient's past medical, surgical, social, and family histories were  "reviewed today and no changes are noted.    REVIEW OF SYSTEMS:  Constitutional: NEGATIVE for fever, chills, change in weight  Skin: NEGATIVE for worrisome rashes, moles or lesions  GI/: NEGATIVE for bowel or bladder changes  Neuro: NEGATIVE for weakness, dizziness or paresthesias    OBJECTIVE:  BP (!) 161/91   Ht 1.727 m (5' 8\")   Wt 83.5 kg (184 lb)   BMI 27.98 kg/m     General: healthy, alert and in no distress  HEENT: no scleral icterus or conjunctival erythema  Skin: no suspicious lesions or rash. No jaundice.  CV: regular rhythm by palpation, no pedal edema  Resp: normal respiratory effort without conversational dyspnea   Psych: normal mood and affect  Gait: normal steady gait with appropriate coordination and balance  Neuro: normal light touch sensory exam of the extremities.    MSK:  THORACIC/LUMBAR SPINE  Inspection:    No gross deformity/asymmetry  Palpation:  landmarks are nontender.  Range of Motion:     Lumbar flexion within normal limits    Lumbar extension within normal limits  Strength:    Full strength throughout hips/quads/hamstrings  Special Tests:    Positive: straight leg raise (left), femoral stretch test (bilateral)    Negative: straight leg raise (right)    Independent visualization of the below image:  Results for orders placed or performed during the hospital encounter of 10/29/24   MR Lumbar Spine w/o Contrast    Narrative    MRI LUMBAR SPINE WITHOUT CONTRAST   10/29/2024 12:23 PM     HISTORY: Chronic worsening pain of bilateral hips and left lower  extremity.  Eval for lumbar stenosis and nerve root impingement;  Sciatica of left side; Degeneration of intervertebral disc of lumbar  region with lower extremity pain     TECHNIQUE: Multiplanar multisequence MRI of the lumbar spine without  contrast.     COMPARISON: 10-2-23.     FINDINGS:    Vertebral body heights are maintained. Marrow signal unremarkable.  Degenerative endplate changes at L5-S1. Conus terminates normally. " No  anterolisthesis or retrolisthesis. Loss of disc height findings most  notably at L5-S1.    L1-2: No canal stenosis. Mild facet disease. No significant foraminal  narrowing.    L2-3: Broad-based disc bulge without canal stenosis. Facet disease. No  significant foraminal narrowing.    L3-4: Broad-based disc bulge with congenitally short pedicles  contributing to mild canal stenosis. Mild left foraminal narrowing.    Facet disease.    L4-5: Broad-based disc bulge with ligamentum flavum hypertrophy and  facet arthrosis with severe canal stenosis. Congenitally short  pedicles. Moderate right and mild left foraminal narrowing.    L5-S1: Broad-based disc bulge eccentric to the left. No canal  stenosis. Facet disease. Moderate left and mild right foraminal  narrowing.    Limited assessment of the intra-abdominal structures are unremarkable.      Impression    IMPRESSION:  Multilevel degenerative changes which are exacerbated in  the inferior lumbar spine as a result of congenitally short pedicles.  Please see above discussion for level specific findings. Severe canal  stenotic findings noted at L4-5.     ADELSO VEE MD         SYSTEM ID:  [id number]         Albert Yeo MD, [facility]

## 2024-11-05 NOTE — LETTER
11/5/2024      Castillo Allen  [address]  [facility] [address]      Dear Colleague,    Thank you for referring your patient, Castillo Allen, to the [facility] [place]. Please see a copy of my visit note below.    ASSESSMENT & PLAN  Patient Instructions     1. Sciatica of left side    2. Degeneration of intervertebral disc of lumbar region with lower extremity pain    3. Spinal stenosis of lumbar region, unspecified whether neurogenic claudication present      -Patient is following up for left-sided low back pain with sciatica symptoms due to multilevel bulging disks, arthritis causing lumbar stenosis.  -MRI results of the lumbar spine reviewed today which showed multilevel facet arthritis, bulging intervertebral discs causing L4 middle and central canal stenosis.  MRI findings were discussed with the patient and all questions were answered  -Patient referred to pain management for lumbar epidural steroid injections.  -Patient will also start gabapentin 300 mg initially at nighttime.  Patient may increase to 600 mg at nighttime if symptoms not improve and if patient is not experiencing side effects.  Patient may then add a 300 mg morning dose if daytime symptoms do not improve and eventually may increase to 600 mg at bedtime and 600 mg in the morning.  Patient will call us once he finds a stable dose that controls symptoms or if we need further titration  -Patient will continue to follow-up with me for further treatment recommendations  -Call direct clinic number [[phone]] at any time with questions or concerns.    Albert Yeo MD [facility]  [facility]        -----    SUBJECTIVE:  Castillo Allen is a 59 year old male who is seen in follow-up for low back pain.They were last seen 10/16/2024.     Since their last visit reports worsening pain. They indicate that their current pain level is 5/10. They have tried  "rest/activity avoidance, Tylenol, Aleve, other medications: Diclofenac and Prednisone (Medrol), physical therapy, MRI (10/29/24), and home exercises.      The patient is seen by themselves.    Patient's past medical, surgical, social, and family histories were reviewed today and no changes are noted.    REVIEW OF SYSTEMS:  Constitutional: NEGATIVE for fever, chills, change in weight  Skin: NEGATIVE for worrisome rashes, moles or lesions  GI/: NEGATIVE for bowel or bladder changes  Neuro: NEGATIVE for weakness, dizziness or paresthesias    OBJECTIVE:  BP (!) 161/91   Ht 1.727 m (5' 8\")   Wt 83.5 kg (184 lb)   BMI 27.98 kg/m     General: healthy, alert and in no distress  HEENT: no scleral icterus or conjunctival erythema  Skin: no suspicious lesions or rash. No jaundice.  CV: regular rhythm by palpation, no pedal edema  Resp: normal respiratory effort without conversational dyspnea   Psych: normal mood and affect  Gait: normal steady gait with appropriate coordination and balance  Neuro: normal light touch sensory exam of the extremities.    MSK:  THORACIC/LUMBAR SPINE  Inspection:    No gross deformity/asymmetry  Palpation:  landmarks are nontender.  Range of Motion:     Lumbar flexion within normal limits    Lumbar extension within normal limits  Strength:    Full strength throughout hips/quads/hamstrings  Special Tests:    Positive: straight leg raise (left), femoral stretch test (bilateral)    Negative: straight leg raise (right)    Independent visualization of the below image:  Results for orders placed or performed during the hospital encounter of 10/29/24   MR Lumbar Spine w/o Contrast    Narrative    MRI LUMBAR SPINE WITHOUT CONTRAST   10/29/2024 12:23 PM     HISTORY: Chronic worsening pain of bilateral hips and left lower  extremity.  Eval for lumbar stenosis and nerve root impingement;  Sciatica of left side; Degeneration of intervertebral disc of lumbar  region with lower extremity pain     TECHNIQUE: " Multiplanar multisequence MRI of the lumbar spine without  contrast.     COMPARISON: 10-2-23.     FINDINGS:    Vertebral body heights are maintained. Marrow signal unremarkable.  Degenerative endplate changes at L5-S1. Conus terminates normally. No  anterolisthesis or retrolisthesis. Loss of disc height findings most  notably at L5-S1.    L1-2: No canal stenosis. Mild facet disease. No significant foraminal  narrowing.    L2-3: Broad-based disc bulge without canal stenosis. Facet disease. No  significant foraminal narrowing.    L3-4: Broad-based disc bulge with congenitally short pedicles  contributing to mild canal stenosis. Mild left foraminal narrowing.    Facet disease.    L4-5: Broad-based disc bulge with ligamentum flavum hypertrophy and  facet arthrosis with severe canal stenosis. Congenitally short  pedicles. Moderate right and mild left foraminal narrowing.    L5-S1: Broad-based disc bulge eccentric to the left. No canal  stenosis. Facet disease. Moderate left and mild right foraminal  narrowing.    Limited assessment of the intra-abdominal structures are unremarkable.      Impression    IMPRESSION:  Multilevel degenerative changes which are exacerbated in  the inferior lumbar spine as a result of congenitally short pedicles.  Please see above discussion for level specific findings. Severe canal  stenotic findings noted at L4-5.     ADELSO VEE MD         SYSTEM ID:  [id number]         Albert Yeo MD, [facility]      Again, thank you for allowing me to participate in the care of your patient.        Sincerely,        Albert Yeo, MD

## 2024-11-06 ENCOUNTER — TELEPHONE (OUTPATIENT)
Dept: PALLIATIVE MEDICINE | Facility: CLINIC | Age: 59
End: 2024-11-06

## 2024-11-06 ENCOUNTER — OFFICE VISIT (OUTPATIENT)
Dept: UROLOGY | Facility: CLINIC | Age: 59
End: 2024-11-06
Payer: COMMERCIAL

## 2024-11-06 VITALS
HEIGHT: 68 IN | WEIGHT: 184 LBS | DIASTOLIC BLOOD PRESSURE: 76 MMHG | BODY MASS INDEX: 27.89 KG/M2 | SYSTOLIC BLOOD PRESSURE: 140 MMHG

## 2024-11-06 DIAGNOSIS — R97.20 ELEVATED PROSTATE SPECIFIC ANTIGEN (PSA): ICD-10-CM

## 2024-11-06 DIAGNOSIS — N40.0 ENLARGED PROSTATE: Primary | ICD-10-CM

## 2024-11-06 DIAGNOSIS — M54.31 BILATERAL SCIATICA: Primary | ICD-10-CM

## 2024-11-06 DIAGNOSIS — M54.32 BILATERAL SCIATICA: Primary | ICD-10-CM

## 2024-11-06 LAB
ALBUMIN UR-MCNC: NEGATIVE MG/DL
APPEARANCE UR: CLEAR
BILIRUB UR QL STRIP: NEGATIVE
COLOR UR AUTO: YELLOW
GLUCOSE UR STRIP-MCNC: NEGATIVE MG/DL
HGB UR QL STRIP: NEGATIVE
KETONES UR STRIP-MCNC: NEGATIVE MG/DL
LEUKOCYTE ESTERASE UR QL STRIP: NEGATIVE
NITRATE UR QL: NEGATIVE
PH UR STRIP: 6 [PH] (ref 5–7)
RESIDUAL VOLUME (RV) (EXTERNAL): 40
SP GR UR STRIP: 1.01 (ref 1–1.03)
UROBILINOGEN UR STRIP-ACNC: 0.2 E.U./DL

## 2024-11-06 PROCEDURE — 99213 OFFICE O/P EST LOW 20 MIN: CPT | Mod: 25 | Performed by: UROLOGY

## 2024-11-06 PROCEDURE — 81003 URINALYSIS AUTO W/O SCOPE: CPT | Mod: QW | Performed by: UROLOGY

## 2024-11-06 PROCEDURE — 51798 US URINE CAPACITY MEASURE: CPT | Performed by: UROLOGY

## 2024-11-06 ASSESSMENT — PAIN SCALES - GENERAL: PAINLEVEL_OUTOF10: MODERATE PAIN (5)

## 2024-11-06 NOTE — PROGRESS NOTES
Office Visit Note  [facility]  [phone]    UROLOGIC DIAGNOSES:   Elevated PSA    CURRENT INTERVENTIONS:   MRI prostate   Previous trial of Flomax    HISTORY:   Ry returns for urologic follow-up.  His PSA this year was 5.97.  He has no urinary symptoms or complaints at this time.      PAST MEDICAL HISTORY:   Past Medical History:   Diagnosis Date    Allergic rhinitis, cause unspecified     Depressive disorder     Situational at times    Hypertension 2020    Osteoarthritis of right knee, unspecified osteoarthritis type 2023       PAST SURGICAL HISTORY:   Past Surgical History:   Procedure Laterality Date    VASECTOMY      VASECTOMY         FAMILY HISTORY:   Family History   Problem Relation Age of Onset    Diabetes Mother     Thyroid Disease Mother     Hypertension Mother     Depression Mother     Cancer - colorectal Father          at about age 74--dx at around age 50    Hypertension Father     Colon Cancer Father     Diabetes Maternal Grandmother     Cerebrovascular Disease Maternal Grandfather     Diabetes Brother         44    Thyroid Disease Brother     Anxiety Disorder Brother     Obesity Brother     Thyroid Disease Brother     Anxiety Disorder Brother     Thyroid Disease Sister     Depression Sister     Bipolar Disorder Sister     Depression Sister     Anxiety Disorder Son     Depression Son     Autism Spectrum Disorder Son     Attention Deficit Disorder Son     Anxiety Disorder Son     Depression Son     Autism Spectrum Disorder Son     Attention Deficit Disorder Son        SOCIAL HISTORY:   Social History     Socioeconomic History    Marital status:      Spouse name: None    Number of children: None    Years of education: None    Highest education level: None   Tobacco Use    Smoking status: Never     Passive exposure: Never    Smokeless tobacco: Never   Vaping Use    Vaping status: Never Used   Substance and Sexual Activity    Alcohol use: Yes      Alcohol/week: 1.0 standard drink of alcohol     Comment: Minimal, social    Drug use: No    Sexual activity: Yes     Partners: Female     Birth control/protection: Male Surgical     Comment: Vasectomy   Other Topics Concern    Parent/sibling w/ CABG, MI or angioplasty before 65F 55M? No     Social Drivers of Health     Financial Resource Strain: Low Risk  (10/8/2024)    Financial Resource Strain     Within the past 12 months, have you or your family members you live with been unable to get utilities (heat, electricity) when it was really needed?: No   Food Insecurity: Low Risk  (10/8/2024)    Food Insecurity     Within the past 12 months, did you worry that your food would run out before you got money to buy more?: No     Within the past 12 months, did the food you bought just not last and you didn t have money to get more?: No   Transportation Needs: Low Risk  (10/8/2024)    Transportation Needs     Within the past 12 months, has lack of transportation kept you from medical appointments, getting your medicines, non-medical meetings or appointments, work, or from getting things that you need?: No   Physical Activity: Insufficiently Active (10/8/2024)    Exercise Vital Sign     Days of Exercise per Week: 4 days     Minutes of Exercise per Session: 30 min   Stress: No Stress Concern Present (10/8/2024)    [employer] [place] [employer] - Occupational Stress Questionnaire     Feeling of Stress : Not at all   Social Connections: Unknown (10/8/2024)    Social Connection and Isolation Panel [NHANES]     Frequency of Social Gatherings with Friends and Family: Twice a week   Interpersonal Safety: Low Risk  (10/10/2024)    Interpersonal Safety     Do you feel physically and emotionally safe where you currently live?: Yes     Within the past 12 months, have you been hit, slapped, kicked or otherwise physically hurt by someone?: No     Within the past 12 months, have you been humiliated or emotionally abused in other  "ways by your partner or ex-partner?: No   Housing Stability: Low Risk  (10/8/2024)    Housing Stability     Do you have housing? : Yes     Are you worried about losing your housing?: No       Review Of Systems:  Skin: No rash, pruritis, or skin pigmentation  Eyes: No changes in vision  Ears/Nose/Throat: No changes in hearing, no nosebleeds  Respiratory: No shortness of breath, dyspnea on exertion, cough, or hemoptysis  Cardiovascular: No chest pain or palpitations  Gastrointestinal: No diarrhea or constipation. No abdominal pain. No hematochezia  Genitourinary: see HPI  Musculoskeletal: No pain or swelling of joints, normal range of motion  Neurologic: No weakness or tremors  Psychiatric: No recent changes in memory or mood  Hematologic/Lymphatic/Immunologic: No easy bruising or enlarged lymph nodes  Endocrine: No weight gain or loss      PHYSICAL EXAM:    BP (!) 140/76   Ht 1.727 m (5' 8\")   Wt 83.5 kg (184 lb)   BMI 27.98 kg/m      Constitutional: Well developed. Conversant and in no acute distress  Eyes: Anicteric sclera, conjunctiva clear, normal extraocular movements  ENT: Normocephalic and atraumatic,   Skin: Warm and dry. No rashes or lesions  Cardiac: No peripheral edema  Back/Flank: Not done  CNS/PNS: Normal musculature and movements, moves all extremities normally  Respiratory: Normal non-labored breathing  Abdomen: Soft nontender and nondistended  Peripheral Vascular: No peripheral edema  Mental Status/Psych: Alert and Oriented x 3. Normal mood and affect    Penis: Not done  Scrotal Skin: Not done  Testicles: Not done  Epididymis: Not done  Digital Rectal Exam: The prostate is slightly enlarged, benign and symmetric to palpation    Cystoscopy: Not done    Imaging: None    Urinalysis: UA RESULTS:  Recent Labs   Lab Test 11/06/24  1008   COLOR Yellow   APPEARANCE Clear   URINEGLC Negative   URINEBILI Negative   URINEKETONE Negative   SG 1.015   UBLD Negative   URINEPH 6.0   PROTEIN Negative "   UROBILINOGEN 0.2   NITRITE Negative   LEUKEST Negative       PSA: 5.97    Post Void Residual: 40mL    Other labs: None today      IMPRESSION:  Elevated PSA    PLAN:  His PSA is elevated but he has been higher than this in the past.  Exam is normal today.  I recommended continued observation.  I will see him back again in 1 year for his next PSA check.        Jere Rutherford M.D.

## 2024-11-06 NOTE — NURSING NOTE
Chief Complaint   Patient presents with    Enlarged prostate      Pt has no urinary concerns    PVR: 40 mL    Christelle Garcia, CMA

## 2024-11-06 NOTE — LETTER
2024       RE: Castillo Allen  [address]  [facility] [address]     Dear Colleague,    Thank you for referring your patient, Castillo Allen, to the [facility] [place] [facility]. Please see a copy of my visit note below.    Office Visit Note  [facility]  [phone]    UROLOGIC DIAGNOSES:   Elevated PSA    CURRENT INTERVENTIONS:   MRI prostate   Previous trial of Flomax    HISTORY:   Ry returns for urologic follow-up.  His PSA this year was 5.97.  He has no urinary symptoms or complaints at this time.      PAST MEDICAL HISTORY:   Past Medical History:   Diagnosis Date     Allergic rhinitis, cause unspecified      Depressive disorder     Situational at times     Hypertension 2020     Osteoarthritis of right knee, unspecified osteoarthritis type 2023       PAST SURGICAL HISTORY:   Past Surgical History:   Procedure Laterality Date     VASECTOMY       VASECTOMY         FAMILY HISTORY:   Family History   Problem Relation Age of Onset     Diabetes Mother      Thyroid Disease Mother      Hypertension Mother      Depression Mother      Cancer - colorectal Father          at about age 74--dx at around age 50     Hypertension Father      Colon Cancer Father      Diabetes Maternal Grandmother      Cerebrovascular Disease Maternal Grandfather      Diabetes Brother         44     Thyroid Disease Brother      Anxiety Disorder Brother      Obesity Brother      Thyroid Disease Brother      Anxiety Disorder Brother      Thyroid Disease Sister      Depression Sister      Bipolar Disorder Sister      Depression Sister      Anxiety Disorder Son      Depression Son      Autism Spectrum Disorder Son      Attention Deficit Disorder Son      Anxiety Disorder Son      Depression Son      Autism Spectrum Disorder Son      Attention Deficit Disorder Son        SOCIAL HISTORY:   Social History     Socioeconomic  History     Marital status:      Spouse name: None     Number of children: None     Years of education: None     Highest education level: None   Tobacco Use     Smoking status: Never     Passive exposure: Never     Smokeless tobacco: Never   Vaping Use     Vaping status: Never Used   Substance and Sexual Activity     Alcohol use: Yes     Alcohol/week: 1.0 standard drink of alcohol     Comment: Minimal, social     Drug use: No     Sexual activity: Yes     Partners: Female     Birth control/protection: Male Surgical     Comment: Vasectomy   Other Topics Concern     Parent/sibling w/ CABG, MI or angioplasty before 65F 55M? No     Social Drivers of Health     Financial Resource Strain: Low Risk  (10/8/2024)    Financial Resource Strain      Within the past 12 months, have you or your family members you live with been unable to get utilities (heat, electricity) when it was really needed?: No   Food Insecurity: Low Risk  (10/8/2024)    Food Insecurity      Within the past 12 months, did you worry that your food would run out before you got money to buy more?: No      Within the past 12 months, did the food you bought just not last and you didn t have money to get more?: No   Transportation Needs: Low Risk  (10/8/2024)    Transportation Needs      Within the past 12 months, has lack of transportation kept you from medical appointments, getting your medicines, non-medical meetings or appointments, work, or from getting things that you need?: No   Physical Activity: Insufficiently Active (10/8/2024)    Exercise Vital Sign      Days of Exercise per Week: 4 days      Minutes of Exercise per Session: 30 min   Stress: No Stress Concern Present (10/8/2024)    [employer] [place] of Occupational [employer] - Occupational Stress Questionnaire      Feeling of Stress : Not at all   Social Connections: Unknown (10/8/2024)    Social Connection and Isolation Panel [NHANES]      Frequency of Social Gatherings with Friends and Family:  "Twice a week   Interpersonal Safety: Low Risk  (10/10/2024)    Interpersonal Safety      Do you feel physically and emotionally safe where you currently live?: Yes      Within the past 12 months, have you been hit, slapped, kicked or otherwise physically hurt by someone?: No      Within the past 12 months, have you been humiliated or emotionally abused in other ways by your partner or ex-partner?: No   Housing Stability: Low Risk  (10/8/2024)    Housing Stability      Do you have housing? : Yes      Are you worried about losing your housing?: No       Review Of Systems:  Skin: No rash, pruritis, or skin pigmentation  Eyes: No changes in vision  Ears/Nose/Throat: No changes in hearing, no nosebleeds  Respiratory: No shortness of breath, dyspnea on exertion, cough, or hemoptysis  Cardiovascular: No chest pain or palpitations  Gastrointestinal: No diarrhea or constipation. No abdominal pain. No hematochezia  Genitourinary: see HPI  Musculoskeletal: No pain or swelling of joints, normal range of motion  Neurologic: No weakness or tremors  Psychiatric: No recent changes in memory or mood  Hematologic/Lymphatic/Immunologic: No easy bruising or enlarged lymph nodes  Endocrine: No weight gain or loss      PHYSICAL EXAM:    BP (!) 140/76   Ht 1.727 m (5' 8\")   Wt 83.5 kg (184 lb)   BMI 27.98 kg/m      Constitutional: Well developed. Conversant and in no acute distress  Eyes: Anicteric sclera, conjunctiva clear, normal extraocular movements  ENT: Normocephalic and atraumatic,   Skin: Warm and dry. No rashes or lesions  Cardiac: No peripheral edema  Back/Flank: Not done  CNS/PNS: Normal musculature and movements, moves all extremities normally  Respiratory: Normal non-labored breathing  Abdomen: Soft nontender and nondistended  Peripheral Vascular: No peripheral edema  Mental Status/Psych: Alert and Oriented x 3. Normal mood and affect    Penis: Not done  Scrotal Skin: Not done  Testicles: Not done  Epididymis: Not " done  Digital Rectal Exam: The prostate is slightly enlarged, benign and symmetric to palpation    Cystoscopy: Not done    Imaging: None    Urinalysis: UA RESULTS:  Recent Labs   Lab Test 11/06/24  1008   COLOR Yellow   APPEARANCE Clear   URINEGLC Negative   URINEBILI Negative   URINEKETONE Negative   SG 1.015   UBLD Negative   URINEPH 6.0   PROTEIN Negative   UROBILINOGEN 0.2   NITRITE Negative   LEUKEST Negative       PSA: 5.97    Post Void Residual: 40mL    Other labs: None today      IMPRESSION:  Elevated PSA    PLAN:  His PSA is elevated but he has been higher than this in the past.  Exam is normal today.  I recommended continued observation.  I will see him back again in 1 year for his next PSA check.        Jere Rutherford M.D.              Again, thank you for allowing me to participate in the care of your patient.      Sincerely,    Jere Rutherford MD

## 2024-11-06 NOTE — TELEPHONE ENCOUNTER
"Screening Questions for Radiology Injections:    Injection to be done at which interventional clinic site? [facility]    If choosing [facility] for location, please inform patient:  \"[facility] is a Hospital based clinic. Before your visit, you should check with your insurance about how it covers the charges for facility services in a hospital-based clinic.     Procedure ordered by Yeo, Albert, MD in [facility]     Procedure ordered? LESI  Transforaminal Cervical ELIDIA - Send to [facility] - No [facility] providers perform this procedure    What insurance would patient like us to bill for this procedure? UMR  IF SCHEDULING IN [place] PAIN OR SPINE PLEASE SCHEDULE AT LEAST 7-10 BUSINESS DAYS OUT SO A PA CAN BE OBTAINED  Worker's comp or MVA (motor vehicle accident) -Any injection DO NOT SCHEDULE and route to Anil Richards.    [employer] insurance - For ALL INJECTIONS DO NOT SCHEDULE and route to Olinda Freire.     ALL BCBS, [employer] and [employer] CIGNA - DO NOT SCHEDULE and route to Olinda Freire  MEDICA- ALL INJECTIONS- route to Olinda Freire    Is patient scheduled at [facility]? NO   If YES, route every encounter to [facility] SPINE CENTER CARE NAVIGATION POOL [[phone]]    Is an  needed? No     Patient has a  home? (Review Grid) YES: Informed     Any chance of pregnancy? Not Applicable   If YES, do NOT schedule and route to RN pool  - Dr. Jj route to PM&R Nurse  [[id number]]      Is patient actively being treated for cancer or immunocompromised? No  If YES, do NOT schedule and route to RN pool/ Dr. Jj's Team    Does the patient have a bleeding or clotting disorder? No   If YES, okay to schedule AND route to RN nurse / Dr. Jj's Team   (For any patients with platelet count <100, RN must forward to provider)    Is patient taking any Blood Thinners OR Antiplatelet medication?  No   If hold needed, do NOT schedule, route to RN maria luz/ Dr. Jj's " Team  Examples:   Blood Thinners: (Coumadin, Warfarin, Jantoven, Pradaxa, Xarelto, Eliquis, Edoxaban, Enoxaparin, Lovenox, Heparin, Arixtra, Fondaparinux or Fragmin)  Antiplatelet Medications: (Plavix, Brilinta or Effient)     Is patient taking any aspirin products (includes Excedrin and Fiorinal)? No   If yes route to RN pool/ Dr. Jj's Team - Do not schedule    Is patient taking any GLP-1 Antagonist (hold needed for sedation patients only) No   (semaglutide (Ozempic, Wegovy), dulaglutide (Trulicity), exenatide ER (Bydureon), tirzepatide (Mounjaro), Liraglutide (Saxenda, Victoza), semaglutide (Rybelsus), Terzepatide (Zepbound)  If YES, okay to schedule AND route to RN  / Dr. Jj's Team      Any allergies to contrast dye, iodine, shellfish, or numbing and steroid medications? No  If YES, schedule and add allergy information to appointment notes AND route to the RN pool/ Dr. Jj's Team  If ELIDIA and Contrast Dye / Iodine Allergy? DO NOT SCHEDULE, route to RN pool/ Dr. Jj's Team  Allergies: Seasonal allergies     Does patient have an active infection or treated for one within the past week? No  Is patient currently taking any antibiotics or steroid medications?  No   For patients on chronic, preventative, or prophylactic antibiotics, procedures may be scheduled.   For patients on antibiotics for active or recent infection, schedule 4 days after completed.  For patients on steroid medications, schedule 4 days after completed.     Has the patient had a flu shot or any other vaccinations within the past 7 days? No  If yes, explain that for the vaccine to work best they need to:     wait 1 week before and 1 week after getting any Vaccine  wait 1 week before and 2 weeks after getting any Covid Vaccine   If patient has concerns about the timing, send to RN pool/ Dr. jJ's Team    Does patient have an MRI/CT?  YES: 10/29/24 Include Date and Check Procedure Scheduling Grid to see if required.  Was the  MRI/CT done within the last 3 years?  Yes   If no route to RN Pool/ Dr. Jj's Team  If yes, where was the MRI/CT done? [facility]    Refer to PACS Transmissions list for approved external locations and route to RN Pool High Priority/ Dr. Coxs Team  If MRI was not done at approved external location do NOT schedule and route to RN pool/ Dr. Jj's Team    If patient has an imaging disc, the injection MAY be scheduled but patient must bring disc to appt or appt will be cancelled.    Is patient able to transfer to a procedure table with minimal or no assistance? Yes   If no, do NOT schedule and route to RN Pool/ Dr. Jj's Team    Procedure Specific Instructions:  If celiac plexus block, informed patient NPO for 6 hours and that it is okay to take medications with sips of water, especially blood pressure medications Not Applicable       If this is for a cervical procedure, informed patient that aspirin needs to be held for 6 days.   Not Applicable    Sedation, If Sedation is ordered for any procedure, patient must be NPO for 6 hours prior to procedure Not Applicable    If IV needed:  Do not schedule procedures requiring IV placement in the first appointment of the day or first appointment after lunch. Do NOT schedule at 0745, 0815 or 1245.     Instructed patient to arrive 30 minutes early for IV start if required. (Check Procedure Scheduling Grid)  Not Applicable    Reminders:  If you are started on any steroids or antibiotics between now and your appointment, you must contact us because the procedure may need to be cancelled.  Yes    As a reminder, receiving steroids can decrease your body's ability to fight infection.   Would you still like to move forward with scheduling the injection?  Yes    IV Sedation is not provided for procedures. If oral anti-anxiety medication is needed, the patient should request this from their referring provider.    Instruct patient to arrive as directed prior to the scheduled  appointment time:  If IV needed 30 minutes before appointment time     For patients 85 or older we recommend having an adult stay w/ them for the remainder of the day.     If the patient is Diabetic, remind them to bring their glucometer.      Does the patient have any questions?  NO  Katharine Smith  [facility]

## 2024-11-14 NOTE — PROGRESS NOTES
[facility] - Procedure Note    Date of Visit: 11/15/2024    Procedure performed: Lumbar L3-4 interlaminar epidural steroid injection (can not do injection at the L4-5 level secondary to severe central stenosis)   Diagnosis: Lumbar spondylosis; Lumbar radiculitis/radiculopathy  : Angy Clayton MD & Marcellus Shen MD (pain fellow)   Anesthesia: none    Indications: Castillo Allen is a 59 year old male who is seen at the request of Dr. Yeo for a lumbar epidural steroid injection. The patient describes left sided hip and leg pain. The patient has been exhibiting symptoms consistent with lumbar intraspinal inflammation and radiculopathy. Symptoms have been persistent, disabling, and intermittently severe. The patient reports minimal improvement with conservative treatment, including PT and medications.      MRI LUMBAR SPINE was done on 10/29/2024 which showed:   FINDINGS:     Vertebral body heights are maintained. Marrow signal unremarkable.  Degenerative endplate changes at L5-S1. Conus terminates normally. No  anterolisthesis or retrolisthesis. Loss of disc height findings most  notably at L5-S1.     L1-2: No canal stenosis. Mild facet disease. No significant foraminal  narrowing.     L2-3: Broad-based disc bulge without canal stenosis. Facet disease. No  significant foraminal narrowing.     L3-4: Broad-based disc bulge with congenitally short pedicles  contributing to mild canal stenosis. Mild left foraminal narrowing.     Facet disease.     L4-5: Broad-based disc bulge with ligamentum flavum hypertrophy and  facet arthrosis with severe canal stenosis. Congenitally short  pedicles. Moderate right and mild left foraminal narrowing.     L5-S1: Broad-based disc bulge eccentric to the left. No canal  stenosis. Facet disease. Moderate left and mild right foraminal  narrowing.     Limited assessment of the intra-abdominal structures are unremarkable.                                                                     IMPRESSION:  Multilevel degenerative changes which are exacerbated in  the inferior lumbar spine as a result of congenitally short pedicles.  Please see above discussion for level specific findings. Severe canal  stenotic findings noted at L4-5.     Allergies:      Allergies   Allergen Reactions    Seasonal Allergies         Vitals:  /79   Pulse 85   SpO2 98%     Review of Systems: The patient denies recent fever, chills, illness, use of antibiotics or anticoagulants. All other 10-point review of systems negative.     Procedure: The procedure and risks were explained, and informed written consent was obtained from the patient. Risks include but are not limited to: infection, bleeding, increased pain, and damage to soft tissue, nerve, muscle, and vasculature structures. After getting informed consent, patient was brought into the procedure suite and was placed in a prone position on the procedure table. A Pause for the Cause was performed. Patient was prepped and draped in sterile fashion.     The L3-4 interspace was identified with use of fluoroscopy in AP view. A 25-gauge, 1.5 inch needle was used to anesthetize the skin and subcutaneous tissue entry site with a total of 2 ml of 1% lidocaine. Under fluoroscopic visualization, a 20-gauge, 3.5 inch Tuohy epidural needle was slowly advanced towards the epidural space a few millimeters left of midline. The latter part of the needle advancement was guided with fluoroscopy in the lateral view. The epidural space was identified using loss of resistance technique. After negative aspiration for heme and cerebrospinal fluid, a total of 1 mL of non-ionic contrast was injected to confirm needle placement with 0 mL of contrast wasted. Epidurogram confirmed spread within the posterior epidural space. 2 ml of 40mg/ml of triamcinolone, 2 ml of 1% lidocaine, and 1 ml of preservative free saline was injected. The needle was removed.  Images were  saved to PACS.    The patient tolerated the procedure well, and there was no evidence of procedural complications. No new sensory or motor deficits were noted following the procedure. The patient was stable and able to ambulate on discharge home. Post-procedure instructions were provided.     Pre-procedure pain score: 3/10 in the back, 3/10 in the leg  Post-procedure pain score: 0/10 in the back, 0/10 in the leg    Assessment/Plan: Castillo Allen is a 59 year old male s/p lumbar interlaminar epidural steroid injection today for lumbar spondylosis and radiculitis/radiculopathy.     1. Following today's procedure, the patient was advised to contact the Pain Management Center for any of the following:   Fever, chills, or night sweats   New onset of pain, numbness, or weakness   Any questions/concerns regarding the procedure  If unable to contact the Pain Center, the patient was instructed to go to a local Emergency Room for any complications.   2. Follow-up with the referring provider in 2 weeks for post-procedure evaluation.    RANJAN DUENAS MD   Pain Management

## 2024-11-15 ENCOUNTER — RADIOLOGY INJECTION OFFICE VISIT (OUTPATIENT)
Dept: PALLIATIVE MEDICINE | Facility: CLINIC | Age: 59
End: 2024-11-15
Attending: FAMILY MEDICINE
Payer: COMMERCIAL

## 2024-11-15 VITALS — OXYGEN SATURATION: 96 % | HEART RATE: 74 BPM | DIASTOLIC BLOOD PRESSURE: 80 MMHG | SYSTOLIC BLOOD PRESSURE: 143 MMHG

## 2024-11-15 DIAGNOSIS — M54.32 BILATERAL SCIATICA: ICD-10-CM

## 2024-11-15 DIAGNOSIS — M48.061 SPINAL STENOSIS OF LUMBAR REGION, UNSPECIFIED WHETHER NEUROGENIC CLAUDICATION PRESENT: ICD-10-CM

## 2024-11-15 DIAGNOSIS — M51.361 DEGENERATION OF INTERVERTEBRAL DISC OF LUMBAR REGION WITH LOWER EXTREMITY PAIN: ICD-10-CM

## 2024-11-15 DIAGNOSIS — M54.31 BILATERAL SCIATICA: ICD-10-CM

## 2024-11-15 DIAGNOSIS — M54.16 LUMBAR RADICULOPATHY: Primary | ICD-10-CM

## 2024-11-15 DIAGNOSIS — M54.32 SCIATICA OF LEFT SIDE: ICD-10-CM

## 2024-11-15 RX ORDER — TRIAMCINOLONE ACETONIDE 40 MG/ML
40 INJECTION, SUSPENSION INTRA-ARTICULAR; INTRAMUSCULAR ONCE
Status: COMPLETED | OUTPATIENT
Start: 2024-11-15 | End: 2024-11-15

## 2024-11-15 RX ADMIN — TRIAMCINOLONE ACETONIDE 40 MG: 40 INJECTION, SUSPENSION INTRA-ARTICULAR; INTRAMUSCULAR at 08:22

## 2024-11-15 ASSESSMENT — PAIN SCALES - GENERAL: PAINLEVEL_OUTOF10: MILD PAIN (3)

## 2024-11-15 NOTE — NURSING NOTE
Pre-procedure Intake  If YES to any questions or NO to having a   Please complete laminated checklist and leave on the computer keyboard for Provider, verbally inform provider if able.    For SCS Trial, RFA's or any sedation procedure:  Have you been fasting? NA  If yes, for how long?     Are you taking any any blood thinners such as Coumadin, Warfarin, Jantoven, Pradaxa Xarelto, Eliquis, Edoxaban, Enoxaparin, Lovenox, Heparin, Arixtra, Fondaparinux, or Fragmin? OR Antiplatelet medication such as Plavix, Brilinta, or Effient?   No   If yes, when did you take your last dose?     Do you take aspirin?  No  If cervical procedure, have you held aspirin for 6 days?   NA    Is the Pt taking any GLP-1 Antagonist (hold needed for sedation patients only)  (semaglutide (Ozempic, Wegovy), dulaglutide (Trulicity), exenatide ER (Bydureon), tirzepatide (Mounjaro), Liraglutide (Saxenda, Victoza), semaglutide (Rybelsus)     NA  If yes, when did you take your last dose?     Do you have any allergies to contrast dye, iodine, steroid and/or numbing medications?  NO    Are you currently taking antibiotics or have an active infection?  NO    Have you had a fever/elevated temperature within the past week? NO    Are you currently taking oral steroids? NO    Do you have a ? Yes    Are you pregnant or breastfeeding?  Not Applicable    Have you received any vaccinations in the last week? NO    Notify provider and RNs if systolic BP >170, diastolic BP >100, P >100 or O2 sats < 90%      Sun Vaughn MA  [facility]

## 2024-11-15 NOTE — NURSING NOTE
Discharge Information    IV Discontiued Time:  NA    Amount of Fluid Infused:  NA    Discharge Criteria = When patient returns to baseline or as per MD order    Consciousness:  Pt is fully awake    Circulation:  BP +/- 20% of pre-procedure level    Respiration:  Patient is able to breathe deeply    O2 Sat:  Patient is able to maintain O2 Sat >92% on room air    Activity:  Moves 4 extremities on command    Ambulation:  Patient is able to stand and walk or stand and pivot into wheelchair    Dressing:  Clean/dry or No Dressing    Notes:   Discharge instructions and AVS given to patient    Patient meets criteria for discharge?  YES    Admitted to PCU?  No    Responsible adult present to accompany patient home?  Yes    Signature/Title:    Abi Marie RN  RN Care Coordinator  [facility] [place]

## 2024-11-15 NOTE — PATIENT INSTRUCTIONS
[facility] Procedure Discharge Instructions    Today you saw:   Dr. Angy Clayton     Your procedure:  Epidural steroid injection       Medications used:  Lidocaine (anesthetic)  Kenalog (steroid)  Omnipaque (contrast)            Be cautious when walking as numbness and/or weakness in the legs may occur up to 6-8 hours after the procedure due to effect of the local anesthetic  Do not drive for 6 hours. The effect of the local anesthetic could slow your reflexes.   Avoid strenuous activity for the first 24 hours. You may resume your regular activities after that.   You may shower, however avoid swimming, tub baths or hot tubs for 24 hours following your procedure  You may have a mild to moderate increase in pain for several days following the injection.    You may use ice packs for 10-15 minutes, 3 to 4 times a day at the injection site for comfort  Do not use heat to painful areas for 6 to 8 hours. This will give the local anesthetic time to wear off and prevent you from accidentally burning your skin.  Unless you have been directed to avoid the use of anti-inflammatory medications (NSAIDS-ibuprofen, Aleve, Motrin), you may use these medications or Tylenol for pain control if needed.   With diabetes, check your blood sugar more frequently than usual as your blood sugar may be higher than normal for 10-14 days following a steroid injection. Contact your doctor who manages your diabetes if your blood sugar is higher than usual  Possible side effects of steroids that you may experience include flushing, elevated blood pressure, increased appetite, mild headaches and restlessness.  All of these symptoms will get better with time.  It may take up to 14 days for the steroid medication to start working although you may feel the effect as early as a few days after the procedure.   Follow up with your referring provider in 2-3 weeks- Dr Yeo    If you experience any of the following, call the pain center  line during work hours at [phone] or on-call physician after hours at [phone]:  -Fever over 100 degree F  -Swelling, bleeding, redness, drainage, warmth at the injection site  -Progressive weakness or numbness in your legs   -Loss of bowel or bladder function  -Unusual headache that is not relieved by Tylenol or your regular headache medication  -Unusual new onset of pain that is not improving

## 2024-11-20 ENCOUNTER — THERAPY VISIT (OUTPATIENT)
Dept: PHYSICAL THERAPY | Facility: CLINIC | Age: 59
End: 2024-11-20
Payer: COMMERCIAL

## 2024-11-20 DIAGNOSIS — M54.16 LEFT LUMBAR RADICULOPATHY: Primary | ICD-10-CM

## 2024-11-26 ENCOUNTER — THERAPY VISIT (OUTPATIENT)
Dept: PHYSICAL THERAPY | Facility: CLINIC | Age: 59
End: 2024-11-26
Payer: COMMERCIAL

## 2024-11-26 DIAGNOSIS — M54.16 LEFT LUMBAR RADICULOPATHY: Primary | ICD-10-CM

## 2024-11-26 PROCEDURE — 97530 THERAPEUTIC ACTIVITIES: CPT | Mod: GP | Performed by: PHYSICAL THERAPIST

## 2024-11-26 PROCEDURE — 97110 THERAPEUTIC EXERCISES: CPT | Mod: GP | Performed by: PHYSICAL THERAPIST

## 2024-12-02 ENCOUNTER — OFFICE VISIT (OUTPATIENT)
Dept: DERMATOLOGY | Facility: CLINIC | Age: 59
End: 2024-12-02
Payer: COMMERCIAL

## 2024-12-02 DIAGNOSIS — L82.1 SEBORRHEIC KERATOSIS: Primary | ICD-10-CM

## 2024-12-02 DIAGNOSIS — L91.8 ACROCHORDON: ICD-10-CM

## 2024-12-02 PROCEDURE — 99203 OFFICE O/P NEW LOW 30 MIN: CPT | Performed by: STUDENT IN AN ORGANIZED HEALTH CARE EDUCATION/TRAINING PROGRAM

## 2024-12-02 NOTE — PROGRESS NOTES
[facility] Dermatology Note  Encounter Date: Dec 2, 2024  Office Visit     Reviewed patients past medical history and pertinent chart review prior to patients visit today.     Dermatology Problem List:    Personal Hx: Negative for personal history of skin cancer.   Family Hx: Negative for family history of skin cancer.    Social Hx: Physical therapist  ____________________________________________    Assessment & Plan:     # Seborrheic keratoses, back  - Benign, no further treatment needed. Continue with observation at this time. If lesions become bothersome, patient return to clinic for reevaluation.    # Left lower eyelid margin skin lesion, acrochordon vs verrucal keratosis vs seborrheic keratosis  - Benign, no further treatment needed. Continue with observation at this time. If lesions become bothersome, patient return to clinic for reevaluation.    Follow up: prn for new or changing lesions or new concerns    All risks, benefits and alternatives were discussed with patient.  Patient is in agreement and understands the assessment and plan.  All questions were answered.  Salma Moreno PA-C  [facility]  _______________________________________    CC: Derm Problem (Spot check- back/Skin tag on left lower lid)     HPI:  Mr. Castillo Allen is a(n) 59 year old male who presents today as a return patient for evaluation of a skin lesion involving the mid lower back and left lower eyelid margin.  The lesion on the mid lower back has been present for several years. It does not itch, bleed, or cause pain. It has not grown. He also has a growth on the left lower eyelid margin. This is not bothersome for him at this time.    Patient is otherwise feeling well, without additional skin concerns. He is pretty good with sun protection.     Physical Exam:  SKIN: Focused examination of left lower eyelid margin and back only was performed per patient request.  - throughout the back including  patient's lesion of concern on the mid lower back, waxy, stuck on tan/brown papules and plaques  - left lower eyelid margin, skin colored to tan brown, pedunculated waxy papule    - No other lesions of concern on areas examined.     Medications:  Current Outpatient Medications   Medication Sig Dispense Refill    diclofenac (VOLTAREN) 75 MG EC tablet Take 1 tablet (75 mg) by mouth 2 times daily as needed for moderate pain. 60 tablet 0    gabapentin (NEURONTIN) 300 MG capsule Take 2 capsules (600 mg) by mouth 2 times daily. (Patient not taking: Reported on 11/6/2024) 60 capsule 0    predniSONE (DELTASONE) 20 MG tablet Take 2 tablets (40 mg) by mouth daily. (Patient not taking: Reported on 10/16/2024) 10 tablet 0     No current facility-administered medications for this visit.      Past Medical History:   Patient Active Problem List   Diagnosis    Allergic rhinitis    Hypertrophy of breast    Lumbago    Nonallopathic lesion of sacral region    Nonallopathic lesion of lower extremities    CARDIOVASCULAR SCREENING; LDL GOAL LESS THAN 160    Brachial neuritis or radiculitis     Past Medical History:   Diagnosis Date    Allergic rhinitis, cause unspecified     Depressive disorder     Situational at times    Hypertension 11/01/2020    Osteoarthritis of right knee, unspecified osteoarthritis type 11/9/2023       CC Referred Self, MD  No address on file on close of this encounter.

## 2024-12-02 NOTE — PATIENT INSTRUCTIONS
Proper skin care from [facility]:    -Eliminate harsh soaps as they strip the natural oils from the skin, often resulting in dry itchy skin ( i.e. Dial, Zest, [place] Spring)  -Use mild soaps such as Cetaphil or Dove Sensitive Skin in the shower. You do not need to use soap on arms, legs, and trunk every time you shower unless visibly soiled.   -Avoid hot or cold showers.  -After showering, lightly dry off and apply moisturizing within 2-3 minutes. This will help trap moisture in the skin.   -Aggressive use of a moisturizer at least 1-2 times a day to the entire body (including -Vanicream, Cetaphil, Aquaphor or Cerave) and moisturize hands after every washing.  -We recommend using moisturizers that come in a tub that needs to be scooped out, not a pump. This has more of an oil base. It will hold moisture in your skin much better than a water base moisturizer. The above recommended are non-pore clogging.      Wear a sunscreen with at least SPF 30 on your face, ears, neck and V of the chest daily. Wear sunscreen on other areas of the body if those areas are exposed to the sun throughout the day. Sunscreens can contain physical and/or chemical blockers. Physical blockers are less likely to clog pores, these include zinc oxide and titanium dioxide. Reapply every two hour and after swimming.     Sunscreen examples: https://www.ewg.org/sunscreen/    UV radiation  UVA radiation remains constant throughout the day and throughout the year. It is a longer wavelength than UVB and therefore penetrates deeper into the skin leading to immediate and delayed tanning, photoaging, and skin cancer. 70-80% of UVA and UVB radiation occurs between the hours of 10am-2pm.  UVB radiation  UVB radiation causes the most harmful effects and is more significant during the summer months. However, snow and ice can reflect UVB radiation leading to skin damage during the winter months as well. UVB radiation is responsible for tanning,  burning, inflammation, delayed erythema (pinkness), pigmentation (brown spots), and skin cancer.     I recommend self monthly full body exams and yearly full body exams with a dermatology provider. If you develop a new or changing lesion please follow up for examination. Most skin cancers are pink and scaly or pink and pearly. However, we do see blue/brown/black skin cancers.  Consider the ABCDEs of melanoma when giving yourself your monthly full body exam ( don't forget the groin, buttocks, feet, toes, etc). A-asymmetry, B-borders, C-color, D-diameter, E-elevation or evolving. If you see any of these changes please follow up in clinic. If you cannot see your back I recommend purchasing a hand held mirror to use with a larger wall mirror.       Checking for Skin Cancer  You can find cancer early by checking your skin each month. There are 3 kinds of skin cancer. They are melanoma, basal cell carcinoma, and squamous cell carcinoma. Doing monthly skin checks is the best way to find new marks or skin changes. Follow the instructions below for checking your skin.   The ABCDEs of checking moles for melanoma   Check your moles or growths for signs of melanoma using ABCDE:   Asymmetry: the sides of the mole or growth don t match  Border: the edges are ragged, notched, or blurred  Color: the color within the mole or growth varies  Diameter: the mole or growth is larger than 6 mm (size of a pencil eraser)  Evolving: the size, shape, or color of the mole or growth is changing (evolving is not shown in the images below)    Checking for other types of skin cancer  Basal cell carcinoma or squamous cell carcinoma have symptoms such as:     A spot or mole that looks different from all other marks on your skin  Changes in how an area feels, such as itching, tenderness, or pain  Changes in the skin's surface, such as oozing, bleeding, or scaliness  A sore that does not heal  New swelling or redness beyond the border of a  mole    Who s at risk?  Anyone can get skin cancer. But you are at greater risk if you have:   Fair skin, light-colored hair, or light-colored eyes  Many moles or abnormal moles on your skin  A history of sunburns from sunlight or tanning beds  A family history of skin cancer  A history of exposure to radiation or chemicals  A weakened immune system  If you have had skin cancer in the past, you are at risk for recurring skin cancer.   How to check your skin  Do your monthly skin checkups in front of a full-length mirror. Check all parts of your body, including your:   Head (ears, face, neck, and scalp)  Torso (front, back, and sides)  Arms (tops, undersides, upper, and lower armpits)  Hands (palms, backs, and fingers, including under the nails)  Buttocks and genitals  Legs (front, back, and sides)  Feet (tops, soles, toes, including under the nails, and between toes)  If you have a lot of moles, take digital photos of them each month. Make sure to take photos both up close and from a distance. These can help you see if any moles change over time.   Most skin changes are not cancer. But if you see any changes in your skin, call your doctor right away. Only he or she can diagnose a problem. If you have skin cancer, seeing your doctor can be the first step toward getting the treatment that could save your life.   zerved last reviewed this educational content on 4/1/2019 2000-2020 [employer]. [address] [place] [address], [place] [address]. All rights reserved. This information is not intended as a substitute for professional medical care. Always follow your healthcare professional's instructions.       When should I call my doctor?  If you are worsening or not improving, please, contact us or seek urgent care as noted below.     Who should I call with questions (adults)?    [facility] [phone]  For urgent needs outside of business hours call the [facility] at  [phone] and ask for the dermatology resident on call to be paged  If this is a medical emergency and you are unable to reach an ER, Call 911      If you need a prescription refill, please contact your pharmacy. Refills are approved or denied by our Physicians during normal business hours, Monday through Fridays  Per office policy, refills will not be granted if you have not been seen within the past year (or sooner depending on your child's condition)

## 2024-12-02 NOTE — LETTER
12/2/2024      Castillo Allen  [address]  [place] [address]      Dear Colleague,    Thank you for referring your patient, Castillo Allen, to the [facility]. Please see a copy of my visit note below.    [facility] Note  Encounter Date: Dec 2, 2024  Office Visit     Reviewed patients past medical history and pertinent chart review prior to patients visit today.     Dermatology Problem List:    Personal Hx: Negative for personal history of skin cancer.   Family Hx: Negative for family history of skin cancer.    Social Hx: Physical therapist  ____________________________________________    Assessment & Plan:     # Seborrheic keratoses, back  - Benign, no further treatment needed. Continue with observation at this time. If lesions become bothersome, patient return to clinic for reevaluation.    # Left lower eyelid margin skin lesion, acrochordon vs verrucal keratosis vs seborrheic keratosis  - Benign, no further treatment needed. Continue with observation at this time. If lesions become bothersome, patient return to clinic for reevaluation.    Follow up: prn for new or changing lesions or new concerns    All risks, benefits and alternatives were discussed with patient.  Patient is in agreement and understands the assessment and plan.  All questions were answered.  Salma Moreno PA-C  [facility]  _______________________________________    CC: Derm Problem (Spot check- back/Skin tag on left lower lid)     HPI:  Mr. Castillo Allen is a(n) 59 year old male who presents today as a return patient for evaluation of a skin lesion involving the mid lower back and left lower eyelid margin.  The lesion on the mid lower back has been present for several years. It does not itch, bleed, or cause pain. It has not grown. He also has a growth on the left lower eyelid margin. This is not bothersome for him at this time.    Patient is otherwise  feeling well, without additional skin concerns. He is pretty good with sun protection.     Physical Exam:  SKIN: Focused examination of left lower eyelid margin and back only was performed per patient request.  - throughout the back including patient's lesion of concern on the mid lower back, waxy, stuck on tan/brown papules and plaques  - left lower eyelid margin, skin colored to tan brown, pedunculated waxy papule    - No other lesions of concern on areas examined.     Medications:  Current Outpatient Medications   Medication Sig Dispense Refill     diclofenac (VOLTAREN) 75 MG EC tablet Take 1 tablet (75 mg) by mouth 2 times daily as needed for moderate pain. 60 tablet 0     gabapentin (NEURONTIN) 300 MG capsule Take 2 capsules (600 mg) by mouth 2 times daily. (Patient not taking: Reported on 11/6/2024) 60 capsule 0     predniSONE (DELTASONE) 20 MG tablet Take 2 tablets (40 mg) by mouth daily. (Patient not taking: Reported on 10/16/2024) 10 tablet 0     No current facility-administered medications for this visit.      Past Medical History:   Patient Active Problem List   Diagnosis     Allergic rhinitis     Hypertrophy of breast     Lumbago     Nonallopathic lesion of sacral region     Nonallopathic lesion of lower extremities     CARDIOVASCULAR SCREENING; LDL GOAL LESS THAN 160     Brachial neuritis or radiculitis     Past Medical History:   Diagnosis Date     Allergic rhinitis, cause unspecified      Depressive disorder     Situational at times     Hypertension 11/01/2020     Osteoarthritis of right knee, unspecified osteoarthritis type 11/9/2023       CC Referred Self, MD  No address on file on close of this encounter.       Again, thank you for allowing me to participate in the care of your patient.        Sincerely,        Beverly Moreno PA-C

## 2024-12-05 ENCOUNTER — MYC MEDICAL ADVICE (OUTPATIENT)
Dept: FAMILY MEDICINE | Facility: CLINIC | Age: 59
End: 2024-12-05
Payer: COMMERCIAL

## 2024-12-05 DIAGNOSIS — M54.10 RADICULAR SYNDROME OF LEFT LEG: Primary | ICD-10-CM

## 2024-12-09 ENCOUNTER — PATIENT OUTREACH (OUTPATIENT)
Dept: CARE COORDINATION | Facility: CLINIC | Age: 59
End: 2024-12-09
Payer: COMMERCIAL

## 2024-12-10 ENCOUNTER — THERAPY VISIT (OUTPATIENT)
Dept: PHYSICAL THERAPY | Facility: CLINIC | Age: 59
End: 2024-12-10
Payer: COMMERCIAL

## 2024-12-10 DIAGNOSIS — M54.16 LEFT LUMBAR RADICULOPATHY: Primary | ICD-10-CM

## 2024-12-10 PROCEDURE — 97530 THERAPEUTIC ACTIVITIES: CPT | Mod: GP | Performed by: PHYSICAL THERAPIST

## 2024-12-10 NOTE — PROGRESS NOTES
DISCHARGE  Reason for Discharge: No further expectation of progress.    Equipment Issued:     Discharge Plan: Other services: patient is following up with Dr. Kurtz in neurosugery.    Referring Provider:  Referred Self       12/10/24 0500   Appointment Info   Signing clinician's name / credentials Zunilda Forbes PT Cert MDT   Total/Authorized Visits E&T   Visits Used 8   Medical Diagnosis L lumbar radiculopathy   PT Tx Diagnosis L lumbar radiculopathy   Other pertinent information self referred--needs order by 12/24/24   Progress Note/Certification   Onset of illness/injury or Date of Surgery 07/24/24   Therapy Frequency 1X/week   Predicted Duration 8 weeks   Progress Note Due Date 11/19/24   GOALS   PT Goals 2   PT Goal 1   Goal Identifier Goal 1   Goal Description Pt will be able to sit for 30 min, painfree   Rationale to maximize safety and independence with performance of ADLs and functional tasks   Goal Progress able to do at the end of the day   Target Date 12/24/24   PT Goal 2   Goal Identifier Goal 2   Goal Description Pt will be able to sleep throughout the night without waking due to pain   Rationale to maximize safety and independence with performance of ADLs and functional tasks   Goal Progress having less pain   Target Date 11/30/24   Subjective Report   Subjective Report overall feels unchanged--unloading his spine relieves his symptoms but any WB activity brings his symptoms back on; did make an appt. with neurosurgery for a consult.  Endorses some return of urinary frequency, which he had dealt with previously with imaging revealing an enlarged prostate but that had seemed to improved until the last two weeks in which the frequency and increased again; does endorse difficulty initiating urine stream.   Objective Measures   Objective Measures Objective Measure 1;Objective Measure 2;Objective Measure 3;Objective Measure 4;Objective Measure 5   Objective Measure 1   Objective Measure AROM Lumbar    Details Flexion=min to nil loss (equal tightness), Ext=min to nil loss, L SG=min loss pdm R SG=nil loss   Objective Measure 2   Objective Measure posture correction with lumbar roll   Objective Measure 3   Objective Measure SIJ tests   Details - compression/distraction; - thigh thrust; - ASLR   Objective Measure 4   Objective Measure single leg squat   Details incr loss of control on L, hip collapse, anterior knee excursion   Objective Measure 5   Objective Measure myotomes   Details L: quad=4+/5, ant tib=4/5 R: quad=5/5, ant tib=4+/5   Treatment Interventions (PT)   Interventions Therapeutic Procedure/Exercise;Manual Therapy;Therapeutic Activity   Therapeutic Procedure/Exercise   Therapeutic Procedures Ther Proc 2;Ther Proc 3;Ther Proc 4;Ther Proc 5;Ther Proc 6   Ther Proc 1 Pt education   Ther Proc 1 - Details trial of Slouch/Overcorrect; use in am; relevance of imaging and stenosis vs radiculopathy; traffic light for pain response   Ther Proc 2 rep ERIBERTO   Ther Proc 2 - Details x 10: NE/W P L post thigh/incr flex   Ther Proc 3 attempted single leg dead lift   Ther Proc 3 - Details P L LE pain; hold   PTRx Ther Proc 1 Supine Abdominal Exercise #6 (Dead Bug)   PTRx Ther Proc 1 - Details X10 each side   PTRx Ther Proc 2 Prone Plank   PTRx Ther Proc 2 - Details hold for now   PTRx Ther Proc 3 Prone On Elbows   PTRx Ther Proc 3 - Details rev for as needed   PTRx Ther Proc 4 Neutral Spine Slouch Overcorrect   PTRx Ther Proc 4 - Details dc due to incr pain   PTRx Ther Proc 5 Hip Hinge Bar Slide Down Thighs   PTRx Ther Proc 5 - Details improved form x 10   Skilled Intervention instructed on exercises and recommendations on plan of care   Therapeutic Activity   Therapeutic Activities: dynamic activities to improve functional performance minutes (20984) 20   Ther Act 1 education   Ther Act 1 - Details discussed red flags and indication for further assessment; discussed nature of possible cauda equina symptoms/central canal  stenosis and correlation to his current symptoms.   PTRx Ther Act 1 Icing   Education   Learner/Method Patient;Pictures/Video   Plan   Home program See PTRx   Plan for next session reassess tolerance to single leg strengthening

## 2024-12-11 ENCOUNTER — ALLIED HEALTH/NURSE VISIT (OUTPATIENT)
Dept: FAMILY MEDICINE | Facility: CLINIC | Age: 59
End: 2024-12-11
Payer: COMMERCIAL

## 2024-12-11 DIAGNOSIS — Z23 ENCOUNTER FOR IMMUNIZATION: Primary | ICD-10-CM

## 2024-12-11 PROCEDURE — 99207 PR NO CHARGE NURSE ONLY: CPT

## 2024-12-11 PROCEDURE — 90471 IMMUNIZATION ADMIN: CPT

## 2024-12-11 PROCEDURE — 90746 HEPB VACCINE 3 DOSE ADULT IM: CPT

## 2024-12-11 NOTE — PROGRESS NOTES
Prior to immunization administration, verified patients identity using patient s name and date of birth. Please see Immunization Activity for additional information.     Screening Questionnaire for Adult Immunization    Are you sick today?   No   Do you have allergies to medications, food, a vaccine component or latex?   No   Have you ever had a serious reaction after receiving a vaccination?   No   Do you have a long-term health problem with heart, lung, kidney, or metabolic disease (e.g., diabetes), asthma, a blood disorder, no spleen, complement component deficiency, a cochlear implant, or a spinal fluid leak?  Are you on long-term aspirin therapy?   No   Do you have cancer, leukemia, HIV/AIDS, or any other immune system problem?   No   Do you have a parent, brother, or sister with an immune system problem?   No   In the past 3 months, have you taken medications that affect  your immune system, such as prednisone, other steroids, or anticancer drugs; drugs for the treatment of rheumatoid arthritis, Crohn s disease, or psoriasis; or have you had radiation treatments?   No   Have you had a seizure, or a brain or other nervous system problem?   No   During the past year, have you received a transfusion of blood or blood    products, or been given immune (gamma) globulin or antiviral drug?   No   For women: Are you pregnant or is there a chance you could become       pregnant during the next month?   No   Have you received any vaccinations in the past 4 weeks?   No     Immunization questionnaire answers were all negative.    I have reviewed the following standing orders:   This patient is due and qualifies for the Hepatitis B vaccine.    Click here for Hepatitis B Standing Order    I have reviewed the vaccines inclusion and exclusion criteria; No concerns regarding eligibility.     Patient instructed to remain in clinic for 15 minutes afterwards, and to report any adverse reactions.     Screening performed by Mary SUTTON  Magill, CMA on 12/11/2024 at 8:47 AM.

## 2024-12-18 DIAGNOSIS — M48.061 SPINAL STENOSIS OF LUMBAR REGION, UNSPECIFIED WHETHER NEUROGENIC CLAUDICATION PRESENT: ICD-10-CM

## 2024-12-18 DIAGNOSIS — M54.32 SCIATICA OF LEFT SIDE: ICD-10-CM

## 2024-12-18 NOTE — TELEPHONE ENCOUNTER
Medication Request for: Diclofenac 75 mg EC tablets           Prescription last written on 10/23/24 by Dr. Yeo   Sig: take 1 tab twice daily prn   Last Fill Quantity: 60 with # refills: 0 (1 month supply)    Medication Request for: Gabapentin 300mg            Prescription last written on 11/5/24 by Dr. Yeo   Sig: take 2 capsules twice daily    Last Fill Quantity: 60 with # refills: 0 (15 day supply)     Last Office Visit by provider: 11/5/24    Patient's appointment with spine got moved to 1/7/25.     Please advise regarding refill.     ROCÍO Loco RN     .

## 2024-12-19 RX ORDER — GABAPENTIN 300 MG/1
600 CAPSULE ORAL 2 TIMES DAILY
Qty: 60 CAPSULE | Refills: 0 | Status: SHIPPED | OUTPATIENT
Start: 2024-12-19

## 2024-12-19 RX ORDER — DICLOFENAC SODIUM 75 MG/1
75 TABLET, DELAYED RELEASE ORAL 2 TIMES DAILY PRN
Qty: 60 TABLET | Refills: 0 | Status: SHIPPED | OUTPATIENT
Start: 2024-12-19

## 2025-01-07 ENCOUNTER — TELEPHONE (OUTPATIENT)
Dept: NEUROSURGERY | Facility: CLINIC | Age: 60
End: 2025-01-07
Payer: COMMERCIAL

## 2025-01-07 ENCOUNTER — OFFICE VISIT (OUTPATIENT)
Dept: NEUROSURGERY | Facility: CLINIC | Age: 60
End: 2025-01-07
Attending: PHYSICIAN ASSISTANT
Payer: COMMERCIAL

## 2025-01-07 VITALS — OXYGEN SATURATION: 98 % | SYSTOLIC BLOOD PRESSURE: 153 MMHG | DIASTOLIC BLOOD PRESSURE: 90 MMHG | HEART RATE: 94 BPM

## 2025-01-07 DIAGNOSIS — M48.061 SPINAL STENOSIS OF LUMBAR REGION WITH RADICULOPATHY: Primary | ICD-10-CM

## 2025-01-07 DIAGNOSIS — M54.16 SPINAL STENOSIS OF LUMBAR REGION WITH RADICULOPATHY: Primary | ICD-10-CM

## 2025-01-07 DIAGNOSIS — M54.10 RADICULAR SYNDROME OF LEFT LEG: ICD-10-CM

## 2025-01-07 DIAGNOSIS — M71.38 SYNOVIAL CYST OF LUMBAR SPINE: ICD-10-CM

## 2025-01-07 PROCEDURE — 99214 OFFICE O/P EST MOD 30 MIN: CPT | Performed by: NEUROLOGICAL SURGERY

## 2025-01-07 PROCEDURE — 99204 OFFICE O/P NEW MOD 45 MIN: CPT | Performed by: NEUROLOGICAL SURGERY

## 2025-01-07 ASSESSMENT — PAIN SCALES - GENERAL: PAINLEVEL_OUTOF10: MODERATE PAIN (5)

## 2025-01-07 NOTE — PROGRESS NOTES
It was a pleasure to see Castillo Allen today in Neurosurgery Clinic. He is a 59 year old male who is here for evaluation of his back and left lower extremity symptoms.  He states that he has had some symptoms on and off for years but these have been worse over the fall.    The pain starts in the bilateral gluteal region and radiates in an L5 distribution down the left lower extremity with numbness and tingling along the top of the foot and lateral calf and burning in the lateral thigh on the left.  At times his pain will get as high as an 8 out of 10.    He is done an epidural steroid injection with brief relief.  He has also done extensive physical therapy.  He takes medications including gabapentin and diclofenac which provide temporary relief.    Past Medical History:   Diagnosis Date    Allergic rhinitis, cause unspecified     Depressive disorder     Situational at times    Hypertension 11/01/2020    Osteoarthritis of right knee, unspecified osteoarthritis type 11/9/2023     Past Surgical History:   Procedure Laterality Date    VASECTOMY      VASECTOMY          Allergies   Allergen Reactions    Seasonal Allergies        Current Outpatient Medications:     diclofenac (VOLTAREN) 75 MG EC tablet, Take 1 tablet (75 mg) by mouth 2 times daily as needed for moderate pain., Disp: 60 tablet, Rfl: 0    diclofenac (VOLTAREN) 75 MG EC tablet, TAKE 1 TABLET (75 MG) BY MOUTH 2 TIMES DAILY AS NEEDED FOR MODERATE PAIN., Disp: 60 tablet, Rfl: 0    gabapentin (NEURONTIN) 300 MG capsule, Take 2 capsules (600 mg) by mouth 3 times daily as needed for neuropathic pain., Disp: 180 capsule, Rfl: 1    gabapentin (NEURONTIN) 300 MG capsule, TAKE 2 CAPSULES BY MOUTH 2 TIMES DAILY., Disp: 60 capsule, Rfl: 0  Social History     Socioeconomic History    Marital status:      Spouse name: None    Number of children: None    Years of education: None    Highest education level: None   Tobacco Use    Smoking status: Never     Passive  exposure: Never    Smokeless tobacco: Never   Vaping Use    Vaping status: Never Used   Substance and Sexual Activity    Alcohol use: Yes     Alcohol/week: 1.0 standard drink of alcohol     Comment: Minimal, social    Drug use: No    Sexual activity: Yes     Partners: Female     Birth control/protection: Male Surgical     Comment: Vasectomy   Other Topics Concern    Parent/sibling w/ CABG, MI or angioplasty before 65F 55M? No     Social Drivers of Health     Financial Resource Strain: Low Risk  (10/8/2024)    Financial Resource Strain     Within the past 12 months, have you or your family members you live with been unable to get utilities (heat, electricity) when it was really needed?: No   Food Insecurity: Low Risk  (10/8/2024)    Food Insecurity     Within the past 12 months, did you worry that your food would run out before you got money to buy more?: No     Within the past 12 months, did the food you bought just not last and you didn t have money to get more?: No   Transportation Needs: Low Risk  (10/8/2024)    Transportation Needs     Within the past 12 months, has lack of transportation kept you from medical appointments, getting your medicines, non-medical meetings or appointments, work, or from getting things that you need?: No   Physical Activity: Insufficiently Active (10/8/2024)    Exercise Vital Sign     Days of Exercise per Week: 4 days     Minutes of Exercise per Session: 30 min   Stress: No Stress Concern Present (10/8/2024)    [employer] [place] [employer] - Occupational Stress Questionnaire     Feeling of Stress : Not at all   Social Connections: Unknown (10/8/2024)    Social Connection and Isolation Panel [NHANES]     Frequency of Social Gatherings with Friends and Family: Twice a week   Interpersonal Safety: Low Risk  (10/10/2024)    Interpersonal Safety     Do you feel physically and emotionally safe where you currently live?: Yes     Within the past 12 months, have you been hit,  "slapped, kicked or otherwise physically hurt by someone?: No     Within the past 12 months, have you been humiliated or emotionally abused in other ways by your partner or ex-partner?: No   Housing Stability: Low Risk  (10/8/2024)    Housing Stability     Do you have housing? : Yes     Are you worried about losing your housing?: No      Problem (# of Occurrences) Relation (Name,Age of Onset)    Anxiety Disorder (4) Brother (Nile lAlen), Brother (Thomas Allen), Son (Joshua Allen), Son (Antolin Allen)    Bipolar Disorder (1) Sister (Liz Moritz)    Autism Spectrum Disorder (2) Son (Joshua Allen), Son (Antolin Allen)    Depression (5) Mother (Irina Aleln), Sister (Liz Moritz), Sister (Anais Allen), Son (Joshua Allen), Son (Antolin Allen)    Diabetes (3) Mother (Irina Allen), Maternal Grandmother (Bre Corrigan), Brother (Nile Allen): 44    Hypertension (2) Mother (Irina Allen), Father (Emmanuel Allen)    Cerebrovascular Disease (1) Maternal Grandfather (Kaiser Corrigan)    Thyroid Disease (4) Mother (Irina Allen), Brother (Nile Allen), Brother, Sister (Liz Moritz)    Obesity (1) Brother (Nile Allen)    Cancer - colorectal (1) Father (Emmanuel Allen):  at about age 74--dx at around age 50    Colon Cancer (1) Father (Emmanuel Allen)    Attention Deficit Disorder (2) Son (Joshua Allen), Son (Antolin Allen)             ROS: 10 point ROS neg other than the symptoms noted above in the HPI.    Vitals:    25 1000   BP: (!) 153/90   Pulse: 94   SpO2: 98%     Estimated body mass index is 27.98 kg/m  as calculated from the following:    Height as of 24: 1.727 m (5' 8\").    Weight as of 24: 83.5 kg (184 lb).  Moderate Pain (5)    Oswestry (SHAJI) Questionnaire        2025     8:14 AM   OSWESTRY DISABILITY INDEX   Count 9    Sum 12    Oswestry Score (%) 26.67 %        Patient-reported       Visual Analog Scale (VAS) Questionnaire        2025     8:12 AM   VISUAL ANALOG PAIN " SCALE   Back Pain Scale 0-10 2.5          Awake and alert  Bilateral lower extremity strength is 5 out of 5 in all muscle groups except for 4 out of 5 left ankle eversion  Reflexes symmetric and normal  Left L5 hyperesthesia      Imaging: Review of his lumbar MRI shows severe stenosis at L4-5 from a combination of facet hypertrophy and synovial cyst at this level.  This correlates with his symptomatology.  Imaging was reviewed with the patient shown to the patient clinic today.    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    Plan: I have recommended a left L4-5 minimally invasive bilateral hemilaminectomy and medial facetectomy. The risks benefits and alternatives to the procedure were discussed. Risks include, but are not limited to, bleeding, infection, neurologic injury such as nerve root injury, CSF leak, need for further surgery, failure for symptoms to improve. Questions were solicited and answered, and the patient wishes to proceed with surgery.

## 2025-01-07 NOTE — LETTER
1/7/2025      Castillo Allen  [address]  [facility] [address]      Dear Colleague,    Thank you for referring your patient, Castillo Allen, to the [facility] [place]. Please see a copy of my visit note below.    It was a pleasure to see Castillo Allen today in Neurosurgery Clinic. He is a 59 year old male who is here for evaluation of his back and left lower extremity symptoms.  He states that he has had some symptoms on and off for years but these have been worse over the fall.    The pain starts in the bilateral gluteal region and radiates in an L5 distribution down the left lower extremity with numbness and tingling along the top of the foot and lateral calf and burning in the lateral thigh on the left.  At times his pain will get as high as an 8 out of 10.    He is done an epidural steroid injection with brief relief.  He has also done extensive physical therapy.  He takes medications including gabapentin and diclofenac which provide temporary relief.    Past Medical History:   Diagnosis Date     Allergic rhinitis, cause unspecified      Depressive disorder     Situational at times     Hypertension 11/01/2020     Osteoarthritis of right knee, unspecified osteoarthritis type 11/9/2023     Past Surgical History:   Procedure Laterality Date     VASECTOMY       VASECTOMY          Allergies   Allergen Reactions     Seasonal Allergies        Current Outpatient Medications:      diclofenac (VOLTAREN) 75 MG EC tablet, Take 1 tablet (75 mg) by mouth 2 times daily as needed for moderate pain., Disp: 60 tablet, Rfl: 0     diclofenac (VOLTAREN) 75 MG EC tablet, TAKE 1 TABLET (75 MG) BY MOUTH 2 TIMES DAILY AS NEEDED FOR MODERATE PAIN., Disp: 60 tablet, Rfl: 0     gabapentin (NEURONTIN) 300 MG capsule, Take 2 capsules (600 mg) by mouth 3 times daily as needed for neuropathic pain., Disp: 180 capsule, Rfl: 1     gabapentin (NEURONTIN) 300 MG capsule, TAKE 2 CAPSULES BY MOUTH 2  TIMES DAILY., Disp: 60 capsule, Rfl: 0  Social History     Socioeconomic History     Marital status:      Spouse name: None     Number of children: None     Years of education: None     Highest education level: None   Tobacco Use     Smoking status: Never     Passive exposure: Never     Smokeless tobacco: Never   Vaping Use     Vaping status: Never Used   Substance and Sexual Activity     Alcohol use: Yes     Alcohol/week: 1.0 standard drink of alcohol     Comment: Minimal, social     Drug use: No     Sexual activity: Yes     Partners: Female     Birth control/protection: Male Surgical     Comment: Vasectomy   Other Topics Concern     Parent/sibling w/ CABG, MI or angioplasty before 65F 55M? No     Social Drivers of Health     Financial Resource Strain: Low Risk  (10/8/2024)    Financial Resource Strain      Within the past 12 months, have you or your family members you live with been unable to get utilities (heat, electricity) when it was really needed?: No   Food Insecurity: Low Risk  (10/8/2024)    Food Insecurity      Within the past 12 months, did you worry that your food would run out before you got money to buy more?: No      Within the past 12 months, did the food you bought just not last and you didn t have money to get more?: No   Transportation Needs: Low Risk  (10/8/2024)    Transportation Needs      Within the past 12 months, has lack of transportation kept you from medical appointments, getting your medicines, non-medical meetings or appointments, work, or from getting things that you need?: No   Physical Activity: Insufficiently Active (10/8/2024)    Exercise Vital Sign      Days of Exercise per Week: 4 days      Minutes of Exercise per Session: 30 min   Stress: No Stress Concern Present (10/8/2024)    [employer] [place] [employer] - Occupational Stress Questionnaire      Feeling of Stress : Not at all   Social Connections: Unknown (10/8/2024)    Social Connection and Isolation Panel  "[NHANES]      Frequency of Social Gatherings with Friends and Family: Twice a week   Interpersonal Safety: Low Risk  (10/10/2024)    Interpersonal Safety      Do you feel physically and emotionally safe where you currently live?: Yes      Within the past 12 months, have you been hit, slapped, kicked or otherwise physically hurt by someone?: No      Within the past 12 months, have you been humiliated or emotionally abused in other ways by your partner or ex-partner?: No   Housing Stability: Low Risk  (10/8/2024)    Housing Stability      Do you have housing? : Yes      Are you worried about losing your housing?: No      Problem (# of Occurrences) Relation (Name,Age of Onset)    Anxiety Disorder (4) Brother (Nile Allen), Brother (Thomas Allen), Son (Joshua Allen), Son (Antolin Allen)    Bipolar Disorder (1) Sister (Liz Moritz)    Autism Spectrum Disorder (2) Son (Joshua Allen), Son (Antolin Allen)    Depression (5) Mother (Irina Allen), Sister (Liz Moritz), Sister (Anais Allen), Son (Joshua Allen), Son (Antolin Allen)    Diabetes (3) Mother (Irina Allen), Maternal Grandmother (Bre Corrigan), Brother (Nile Allen): 44    Hypertension (2) Mother (Irina Allen), Father (Emmanuel Allen)    Cerebrovascular Disease (1) Maternal Grandfather (Kaiser Corrigan)    Thyroid Disease (4) Mother (Irina Allen), Brother (Nile Allen), Brother, Sister (Liz Moritz)    Obesity (1) Brother (Nile Allen)    Cancer - colorectal (1) Father (Emmanuel Allen):  at about age 74--dx at around age 50    Colon Cancer (1) Father (Emmanuel Allen)    Attention Deficit Disorder (2) Son (Joshua Allen), Son (Antolin Allen)             ROS: 10 point ROS neg other than the symptoms noted above in the HPI.    Vitals:    25 1000   BP: (!) 153/90   Pulse: 94   SpO2: 98%     Estimated body mass index is 27.98 kg/m  as calculated from the following:    Height as of 24: 1.727 m (5' 8\").    Weight as of 24: 83.5 kg " (184 lb).  Moderate Pain (5)    Oswestry (SHAJI) Questionnaire        1/2/2025     8:14 AM   OSWESTRY DISABILITY INDEX   Count 9    Sum 12    Oswestry Score (%) 26.67 %        Patient-reported       Visual Analog Scale (VAS) Questionnaire        1/2/2025     8:12 AM   VISUAL ANALOG PAIN SCALE   Back Pain Scale 0-10 2.5          Awake and alert  Bilateral lower extremity strength is 5 out of 5 in all muscle groups except for 4 out of 5 left ankle eversion  Reflexes symmetric and normal  Left L5 hyperesthesia      Imaging: Review of his lumbar MRI shows severe stenosis at L4-5 from a combination of facet hypertrophy and synovial cyst at this level.  This correlates with his symptomatology.  Imaging was reviewed with the patient shown to the patient clinic today.    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    Plan: I have recommended a left L4-5 minimally invasive bilateral hemilaminectomy and medial facetectomy. The risks benefits and alternatives to the procedure were discussed. Risks include, but are not limited to, bleeding, infection, neurologic injury such as nerve root injury, CSF leak, need for further surgery, failure for symptoms to improve. Questions were solicited and answered, and the patient wishes to proceed with surgery.           Again, thank you for allowing me to participate in the care of your patient.        Sincerely,        Antolin Kurtz MD    Electronically signed

## 2025-01-07 NOTE — PATIENT INSTRUCTIONS
Patient Instructions    Surgery scheduled at [facility] for Left Lumbar 4 to Lumbar 5 minimally invasive bilateral hemilaminectomy and medial facetectomy with resection of synovial cyst  with Dr. Kurtz     Pre-Operative  Surgical risks: blood clots in the leg or lung, problems urinating, nerve damage, drainage from the incision, infection,stiffness  Pre-operative physical with primary care physician within 30 days of surgical date.   Likely same day procedure with discharge home day of surgery, may stay for 23 hour observation hospitalization for monitoring.     Shower procedure  Please shower with antimicrobial soap the night before surgery and morning of surgery. Please refer to showering instruction sheet in folder.  Eating/Drinking  Stop all solid foods 8 hours before surgery.  Keep drinking clear liquids until 4 hours before surgery  Clear liquids include water, clear juice, black coffee, or clear tea without milk, Gatorade, clear soda.   Medications  Hold Aspirin, NSAIDs (Advil/Ibuprofen, Indocin, Naproxen,Nuprin,Relafen/Nabumetone, Diclofenac,Meloxicam, Aleve, Celebrex) x 7 days prior to surgical date  You can take Tylenol (Acetaminophen) for pain, 1000 mg  Do not exceed 3,000 mg per day   If you are on chronic pain medication (oxycodone, Percocet, hydrocodone, Vicodin, Norco, Dilaudid, morphine, MS Contin, naltrexone, Suboxone, etc) or have a pain contract we will reach out to your pain clinic to gather your most recent records and recommendations for pain management post-op.  Please ask your provider who manages your chronic pain if they require you to schedule an office visit prior to surgery. Continue obtaining your pain medications from your current provider until surgery. Our team will manage your acute post-op pain in the hospital and during the recovery period. Your pain team will continue to manage your chronic pain.   Any other medications prescribed, please discuss with your  primary care provider at your pre-operative physical   You may NOT receive the COVID-19 vaccine 72 hours before or after surgery.    Pain Management  You will have post-operative incisional pain which may require pain medications and muscle relaxants. You will receive medication upon discharge.  You may resume taking NSAIDs (ex. Ibuprofen, aleve, naproxen) immediately post-op   Do NOT drive while taking narcotic pain medication  Do NOT drink alcohol while using any pain medication  You can utilize ice as needed (no longer than 20 minutes at one time)  Avoid putting heat on the incision    Incision Care  No submerging incision in water such as pools, hot tubs, baths for at least 8 weeks or until incision is healed  It is okay to shower, just pat the incision dry   Remove dressing as instructed upon discharge  Watch for signs of infection  Redness, swelling, warmth, drainage, and fever of 101 degrees or higher  Notify clinic [phone].    Activity Restrictions  For the first 6-8 weeks, no lifting > 10 pounds, limited bending, twisting, or overhead reaching.  Take stairs in moderation   Ok to walk as tolerated, take short frequent walks. You may gradually increase the distance as tolerated.   Avoid bed rest and prolonged sitting for longer than 30 minutes (change positions frequently while awake)  No contact sports until after follow up visit  No high impact activities such as; running/jogging, snowmobile or 4 osborne riding or any other recreational vehicles  Please call the clinic if you develop any of the following symptoms:  Swelling and/or warmth in one or both legs  Pain or tenderness in your leg, ankle, foot, or arm   Red or discolored skin     Post-Op Follow Up Appointments  2 week incision check with RN  6 week post op follow up visit with Physician Assistant   3 months post op with Dr. Kurtz   Please call to schedule follow up appointment at [phone]    Resources  If you are currently employed, you will  need to be off work for 2-4 weeks for post op recovery and healing.  Please fax any FMLA/short term disability paperwork to [phone]  You may call our clinic when you'd like to return to work and we can provide a work letter  To allow staff adequate time to complete paperwork, please send as soon as possible       [facility]  Phone: [phone]  Fax: [phone]

## 2025-01-07 NOTE — NURSING NOTE
PRE-SURGERY EDUCATION  Reviewed pre- and post-operative instructions as outlined in the After Visit Summary/Patient Instructions with patient.   Surgery folder was given to patient    Patient Education Topic: Procedure with Dr. Kurtz   Learner(s): Patient and Significant other/Spouse   Knowledge Level: Basic  Readiness to Learn: Ready  Method:  Verbal explanation and Written material   Outcome: Able to verbalize instructions  Barriers to Learning: No barrier      STD/FMLA: No, works part time. No forms needed.   Job Description: Not applicable   Time Off: Not applicable      Patient had the opportunity for questions to be answered. Advised Patient and Significant other/Spouse  to call clinic with any questions/concerns. Gave patient antibacterial soap for pre-surgery skin preparation.     Non-fusion SPINE PRE-SURGICAL CHECKLIST   Intervention/Diagnostic Meets Criteria Details   NDI/SHAJI  Completed within the last 6 months Yes Confirmation of completion within last 6 months   Nicotine Status  Never   Currently using: Cigarettes and None Yes Non-fusion: no action needed     Physical Therapy   Completed within 6 months   Completed on the corresponding body part  Completed at least 4 weeks (unless provider documented that patient unable to tolerate PT) Yes  Records verified and located Internal       Injection  Completed within last 1 years  Completed on the corresponding body part   Yes  Records verified and located Internal       Imaging  MRI or CT completed within 6 months Yes Records verified and located Internal   Chronic Pain or Pain contract  No No N/A

## 2025-01-07 NOTE — NURSING NOTE
"Castillo Allen is a 59 year old male who presents for:  Chief Complaint   Patient presents with    Consult        Vitals:    Vitals:    01/07/25 1000   BP: (!) 153/90   Pulse: 94   SpO2: 98%       BMI:  Estimated body mass index is 27.98 kg/m  as calculated from the following:    Height as of 11/6/24: 5' 8\" (1.727 m).    Weight as of 11/6/24: 184 lb (83.5 kg).    Pain Score:  Moderate Pain (5)        Amendo Phorn      "

## 2025-01-09 RX ORDER — TAMSULOSIN HYDROCHLORIDE 0.4 MG/1
0.4 CAPSULE ORAL DAILY
COMMUNITY

## 2025-01-09 RX ORDER — ACETAMINOPHEN 500 MG
500-1000 TABLET ORAL EVERY 6 HOURS PRN
Status: ON HOLD | COMMUNITY
End: 2025-01-15

## 2025-01-15 ENCOUNTER — ANESTHESIA EVENT (OUTPATIENT)
Dept: SURGERY | Facility: CLINIC | Age: 60
End: 2025-01-15
Payer: COMMERCIAL

## 2025-01-15 ENCOUNTER — HOSPITAL ENCOUNTER (OUTPATIENT)
Facility: CLINIC | Age: 60
Discharge: HOME | End: 2025-01-15
Attending: NEUROLOGICAL SURGERY | Admitting: NEUROLOGICAL SURGERY
Payer: COMMERCIAL

## 2025-01-15 ENCOUNTER — ANESTHESIA (OUTPATIENT)
Dept: SURGERY | Facility: CLINIC | Age: 60
End: 2025-01-15
Payer: COMMERCIAL

## 2025-01-15 VITALS
HEART RATE: 96 BPM | BODY MASS INDEX: 29.38 KG/M2 | DIASTOLIC BLOOD PRESSURE: 97 MMHG | OXYGEN SATURATION: 95 % | SYSTOLIC BLOOD PRESSURE: 163 MMHG | WEIGHT: 187.2 LBS | RESPIRATION RATE: 16 BRPM | HEIGHT: 67 IN | TEMPERATURE: 97 F

## 2025-01-15 DIAGNOSIS — Z98.890 STATUS POST LUMBAR SURGERY: Primary | ICD-10-CM

## 2025-01-15 PROCEDURE — 250N000009 HC RX 250: Performed by: NURSE ANESTHETIST, CERTIFIED REGISTERED

## 2025-01-15 PROCEDURE — 250N000011 HC RX IP 250 OP 636: Performed by: NURSE ANESTHETIST, CERTIFIED REGISTERED

## 2025-01-15 PROCEDURE — 710N000012 HC RECOVERY PHASE 2, PER MINUTE: Performed by: NEUROLOGICAL SURGERY

## 2025-01-15 PROCEDURE — 360N000084 HC SURGERY LEVEL 4 W/ FLUORO, PER MIN: Performed by: NEUROLOGICAL SURGERY

## 2025-01-15 PROCEDURE — 258N000003 HC RX IP 258 OP 636: Performed by: NURSE ANESTHETIST, CERTIFIED REGISTERED

## 2025-01-15 PROCEDURE — 250N000011 HC RX IP 250 OP 636

## 2025-01-15 PROCEDURE — 63267 EXCISE INTRSPINL LESION LMBR: CPT | Mod: AS

## 2025-01-15 PROCEDURE — 63267 EXCISE INTRSPINL LESION LMBR: CPT | Performed by: NEUROLOGICAL SURGERY

## 2025-01-15 PROCEDURE — 250N000005 HC OR RX SURGIFLO HEMOSTATIC MATRIX 10ML 199102S OPNP: Performed by: NEUROLOGICAL SURGERY

## 2025-01-15 PROCEDURE — 250N000013 HC RX MED GY IP 250 OP 250 PS 637: Performed by: ANESTHESIOLOGY

## 2025-01-15 PROCEDURE — 250N000009 HC RX 250: Performed by: NEUROLOGICAL SURGERY

## 2025-01-15 PROCEDURE — 69990 MICROSURGERY ADD-ON: CPT | Performed by: NEUROLOGICAL SURGERY

## 2025-01-15 PROCEDURE — 999N000141 HC STATISTIC PRE-PROCEDURE NURSING ASSESSMENT: Performed by: NEUROLOGICAL SURGERY

## 2025-01-15 PROCEDURE — 272N000001 HC OR GENERAL SUPPLY STERILE: Performed by: NEUROLOGICAL SURGERY

## 2025-01-15 PROCEDURE — 370N000017 HC ANESTHESIA TECHNICAL FEE, PER MIN: Performed by: NEUROLOGICAL SURGERY

## 2025-01-15 PROCEDURE — 710N000009 HC RECOVERY PHASE 1, LEVEL 1, PER MIN: Performed by: NEUROLOGICAL SURGERY

## 2025-01-15 PROCEDURE — 250N000025 HC SEVOFLURANE, PER MIN: Performed by: NEUROLOGICAL SURGERY

## 2025-01-15 RX ORDER — HYDROMORPHONE HCL IN WATER/PF 6 MG/30 ML
0.4 PATIENT CONTROLLED ANALGESIA SYRINGE INTRAVENOUS
Status: DISCONTINUED | OUTPATIENT
Start: 2025-01-15 | End: 2025-01-15 | Stop reason: HOSPADM

## 2025-01-15 RX ORDER — IBUPROFEN 800 MG/1
800 TABLET, FILM COATED ORAL EVERY 8 HOURS PRN
Qty: 40 TABLET | Refills: 0 | Status: SHIPPED | OUTPATIENT
Start: 2025-01-15

## 2025-01-15 RX ORDER — KETOROLAC TROMETHAMINE 15 MG/ML
15 INJECTION, SOLUTION INTRAMUSCULAR; INTRAVENOUS
Status: DISCONTINUED | OUTPATIENT
Start: 2025-01-15 | End: 2025-01-15 | Stop reason: HOSPADM

## 2025-01-15 RX ORDER — FENTANYL CITRATE 50 UG/ML
50 INJECTION, SOLUTION INTRAMUSCULAR; INTRAVENOUS EVERY 5 MIN PRN
Status: DISCONTINUED | OUTPATIENT
Start: 2025-01-15 | End: 2025-01-15 | Stop reason: HOSPADM

## 2025-01-15 RX ORDER — PROPOFOL 10 MG/ML
INJECTION, EMULSION INTRAVENOUS PRN
Status: DISCONTINUED | OUTPATIENT
Start: 2025-01-15 | End: 2025-01-15

## 2025-01-15 RX ORDER — ONDANSETRON 2 MG/ML
INJECTION INTRAMUSCULAR; INTRAVENOUS PRN
Status: DISCONTINUED | OUTPATIENT
Start: 2025-01-15 | End: 2025-01-15

## 2025-01-15 RX ORDER — CEFAZOLIN SODIUM/WATER 2 G/20 ML
2 SYRINGE (ML) INTRAVENOUS
Status: COMPLETED | OUTPATIENT
Start: 2025-01-15 | End: 2025-01-15

## 2025-01-15 RX ORDER — HYDROMORPHONE HCL IN WATER/PF 6 MG/30 ML
0.2 PATIENT CONTROLLED ANALGESIA SYRINGE INTRAVENOUS EVERY 5 MIN PRN
Status: DISCONTINUED | OUTPATIENT
Start: 2025-01-15 | End: 2025-01-15 | Stop reason: HOSPADM

## 2025-01-15 RX ORDER — SODIUM CHLORIDE, SODIUM LACTATE, POTASSIUM CHLORIDE, CALCIUM CHLORIDE 600; 310; 30; 20 MG/100ML; MG/100ML; MG/100ML; MG/100ML
INJECTION, SOLUTION INTRAVENOUS CONTINUOUS PRN
Status: DISCONTINUED | OUTPATIENT
Start: 2025-01-15 | End: 2025-01-15

## 2025-01-15 RX ORDER — ONDANSETRON 2 MG/ML
4 INJECTION INTRAMUSCULAR; INTRAVENOUS EVERY 30 MIN PRN
Status: DISCONTINUED | OUTPATIENT
Start: 2025-01-15 | End: 2025-01-15 | Stop reason: HOSPADM

## 2025-01-15 RX ORDER — ONDANSETRON 4 MG/1
4 TABLET, ORALLY DISINTEGRATING ORAL EVERY 6 HOURS PRN
Status: DISCONTINUED | OUTPATIENT
Start: 2025-01-15 | End: 2025-01-15 | Stop reason: HOSPADM

## 2025-01-15 RX ORDER — LIDOCAINE 40 MG/G
CREAM TOPICAL
Status: DISCONTINUED | OUTPATIENT
Start: 2025-01-15 | End: 2025-01-15 | Stop reason: HOSPADM

## 2025-01-15 RX ORDER — NALOXONE HYDROCHLORIDE 0.4 MG/ML
0.1 INJECTION, SOLUTION INTRAMUSCULAR; INTRAVENOUS; SUBCUTANEOUS
Status: DISCONTINUED | OUTPATIENT
Start: 2025-01-15 | End: 2025-01-15 | Stop reason: HOSPADM

## 2025-01-15 RX ORDER — OXYCODONE HYDROCHLORIDE 5 MG/1
5 TABLET ORAL EVERY 4 HOURS PRN
Status: DISCONTINUED | OUTPATIENT
Start: 2025-01-15 | End: 2025-01-15 | Stop reason: HOSPADM

## 2025-01-15 RX ORDER — OXYCODONE HYDROCHLORIDE 5 MG/1
10 TABLET ORAL EVERY 4 HOURS PRN
Status: DISCONTINUED | OUTPATIENT
Start: 2025-01-15 | End: 2025-01-15 | Stop reason: HOSPADM

## 2025-01-15 RX ORDER — METHOCARBAMOL 750 MG/1
750 TABLET, FILM COATED ORAL EVERY 6 HOURS PRN
Qty: 60 TABLET | Refills: 0 | Status: SHIPPED | OUTPATIENT
Start: 2025-01-15

## 2025-01-15 RX ORDER — ONDANSETRON 2 MG/ML
4 INJECTION INTRAMUSCULAR; INTRAVENOUS EVERY 6 HOURS PRN
Status: DISCONTINUED | OUTPATIENT
Start: 2025-01-15 | End: 2025-01-15 | Stop reason: HOSPADM

## 2025-01-15 RX ORDER — METHOCARBAMOL 750 MG/1
750 TABLET, FILM COATED ORAL EVERY 6 HOURS PRN
Status: DISCONTINUED | OUTPATIENT
Start: 2025-01-15 | End: 2025-01-15 | Stop reason: HOSPADM

## 2025-01-15 RX ORDER — OXYCODONE HYDROCHLORIDE 5 MG/1
5 TABLET ORAL EVERY 6 HOURS PRN
Qty: 40 TABLET | Refills: 0 | Status: SHIPPED | OUTPATIENT
Start: 2025-01-15

## 2025-01-15 RX ORDER — SODIUM CHLORIDE, SODIUM LACTATE, POTASSIUM CHLORIDE, CALCIUM CHLORIDE 600; 310; 30; 20 MG/100ML; MG/100ML; MG/100ML; MG/100ML
INJECTION, SOLUTION INTRAVENOUS CONTINUOUS
Status: DISCONTINUED | OUTPATIENT
Start: 2025-01-15 | End: 2025-01-15 | Stop reason: HOSPADM

## 2025-01-15 RX ORDER — DEXAMETHASONE SODIUM PHOSPHATE 4 MG/ML
INJECTION, SOLUTION INTRA-ARTICULAR; INTRALESIONAL; INTRAMUSCULAR; INTRAVENOUS; SOFT TISSUE PRN
Status: DISCONTINUED | OUTPATIENT
Start: 2025-01-15 | End: 2025-01-15

## 2025-01-15 RX ORDER — ACETAMINOPHEN 325 MG/1
650 TABLET ORAL EVERY 4 HOURS PRN
COMMUNITY
Start: 2025-01-15

## 2025-01-15 RX ORDER — HYDROMORPHONE HCL IN WATER/PF 6 MG/30 ML
0.4 PATIENT CONTROLLED ANALGESIA SYRINGE INTRAVENOUS EVERY 5 MIN PRN
Status: DISCONTINUED | OUTPATIENT
Start: 2025-01-15 | End: 2025-01-15 | Stop reason: HOSPADM

## 2025-01-15 RX ORDER — LIDOCAINE HYDROCHLORIDE 20 MG/ML
INJECTION, SOLUTION INFILTRATION; PERINEURAL PRN
Status: DISCONTINUED | OUTPATIENT
Start: 2025-01-15 | End: 2025-01-15

## 2025-01-15 RX ORDER — EPHEDRINE SULFATE 50 MG/ML
INJECTION, SOLUTION INTRAMUSCULAR; INTRAVENOUS; SUBCUTANEOUS PRN
Status: DISCONTINUED | OUTPATIENT
Start: 2025-01-15 | End: 2025-01-15

## 2025-01-15 RX ORDER — FENTANYL CITRATE 50 UG/ML
25 INJECTION, SOLUTION INTRAMUSCULAR; INTRAVENOUS EVERY 5 MIN PRN
Status: DISCONTINUED | OUTPATIENT
Start: 2025-01-15 | End: 2025-01-15 | Stop reason: HOSPADM

## 2025-01-15 RX ORDER — HYDROMORPHONE HCL IN WATER/PF 6 MG/30 ML
0.2 PATIENT CONTROLLED ANALGESIA SYRINGE INTRAVENOUS
Status: DISCONTINUED | OUTPATIENT
Start: 2025-01-15 | End: 2025-01-15 | Stop reason: HOSPADM

## 2025-01-15 RX ORDER — AMOXICILLIN 250 MG
1 CAPSULE ORAL 2 TIMES DAILY PRN
Qty: 60 TABLET | Refills: 0 | Status: SHIPPED | OUTPATIENT
Start: 2025-01-15

## 2025-01-15 RX ORDER — ACETAMINOPHEN 325 MG/1
975 TABLET ORAL ONCE
Status: COMPLETED | OUTPATIENT
Start: 2025-01-15 | End: 2025-01-15

## 2025-01-15 RX ORDER — FENTANYL CITRATE 50 UG/ML
INJECTION, SOLUTION INTRAMUSCULAR; INTRAVENOUS PRN
Status: DISCONTINUED | OUTPATIENT
Start: 2025-01-15 | End: 2025-01-15

## 2025-01-15 RX ORDER — DEXAMETHASONE SODIUM PHOSPHATE 4 MG/ML
4 INJECTION, SOLUTION INTRA-ARTICULAR; INTRALESIONAL; INTRAMUSCULAR; INTRAVENOUS; SOFT TISSUE
Status: DISCONTINUED | OUTPATIENT
Start: 2025-01-15 | End: 2025-01-15 | Stop reason: HOSPADM

## 2025-01-15 RX ORDER — HYDRALAZINE HYDROCHLORIDE 20 MG/ML
2.5-5 INJECTION INTRAMUSCULAR; INTRAVENOUS EVERY 10 MIN PRN
Status: DISCONTINUED | OUTPATIENT
Start: 2025-01-15 | End: 2025-01-15 | Stop reason: HOSPADM

## 2025-01-15 RX ORDER — CEFAZOLIN SODIUM/WATER 2 G/20 ML
2 SYRINGE (ML) INTRAVENOUS SEE ADMIN INSTRUCTIONS
Status: DISCONTINUED | OUTPATIENT
Start: 2025-01-15 | End: 2025-01-15 | Stop reason: HOSPADM

## 2025-01-15 RX ORDER — BUPIVACAINE HYDROCHLORIDE AND EPINEPHRINE 2.5; 5 MG/ML; UG/ML
INJECTION, SOLUTION INFILTRATION; PERINEURAL PRN
Status: DISCONTINUED | OUTPATIENT
Start: 2025-01-15 | End: 2025-01-15 | Stop reason: HOSPADM

## 2025-01-15 RX ORDER — ACETAMINOPHEN 325 MG/1
975 TABLET ORAL EVERY 8 HOURS
Status: DISCONTINUED | OUTPATIENT
Start: 2025-01-15 | End: 2025-01-15 | Stop reason: HOSPADM

## 2025-01-15 RX ORDER — PROCHLORPERAZINE MALEATE 10 MG
10 TABLET ORAL EVERY 6 HOURS PRN
Status: DISCONTINUED | OUTPATIENT
Start: 2025-01-15 | End: 2025-01-15 | Stop reason: HOSPADM

## 2025-01-15 RX ORDER — ONDANSETRON 4 MG/1
4 TABLET, ORALLY DISINTEGRATING ORAL EVERY 30 MIN PRN
Status: DISCONTINUED | OUTPATIENT
Start: 2025-01-15 | End: 2025-01-15 | Stop reason: HOSPADM

## 2025-01-15 RX ORDER — METOPROLOL TARTRATE 1 MG/ML
1-2 INJECTION, SOLUTION INTRAVENOUS EVERY 5 MIN PRN
Status: DISCONTINUED | OUTPATIENT
Start: 2025-01-15 | End: 2025-01-15 | Stop reason: HOSPADM

## 2025-01-15 RX ADMIN — PHENYLEPHRINE HYDROCHLORIDE 100 MCG: 10 INJECTION INTRAVENOUS at 11:29

## 2025-01-15 RX ADMIN — EPHEDRINE SULFATE 5 MG: 5 INJECTION INTRAVENOUS at 12:28

## 2025-01-15 RX ADMIN — FENTANYL CITRATE 100 MCG: 50 INJECTION INTRAMUSCULAR; INTRAVENOUS at 11:01

## 2025-01-15 RX ADMIN — ROCURONIUM BROMIDE 50 MG: 50 INJECTION, SOLUTION INTRAVENOUS at 11:01

## 2025-01-15 RX ADMIN — HYDROMORPHONE HYDROCHLORIDE 0.5 MG: 1 INJECTION, SOLUTION INTRAMUSCULAR; INTRAVENOUS; SUBCUTANEOUS at 11:16

## 2025-01-15 RX ADMIN — EPHEDRINE SULFATE 5 MG: 5 INJECTION INTRAVENOUS at 11:48

## 2025-01-15 RX ADMIN — ONDANSETRON 4 MG: 2 INJECTION INTRAMUSCULAR; INTRAVENOUS at 12:23

## 2025-01-15 RX ADMIN — SODIUM CHLORIDE, POTASSIUM CHLORIDE, SODIUM LACTATE AND CALCIUM CHLORIDE: 600; 310; 30; 20 INJECTION, SOLUTION INTRAVENOUS at 10:34

## 2025-01-15 RX ADMIN — SODIUM CHLORIDE, POTASSIUM CHLORIDE, SODIUM LACTATE AND CALCIUM CHLORIDE: 600; 310; 30; 20 INJECTION, SOLUTION INTRAVENOUS at 11:56

## 2025-01-15 RX ADMIN — PHENYLEPHRINE HYDROCHLORIDE 0.3 MCG/KG/MIN: 10 INJECTION INTRAVENOUS at 11:33

## 2025-01-15 RX ADMIN — PHENYLEPHRINE HYDROCHLORIDE 200 MCG: 10 INJECTION INTRAVENOUS at 11:35

## 2025-01-15 RX ADMIN — ROCURONIUM BROMIDE 10 MG: 50 INJECTION, SOLUTION INTRAVENOUS at 11:21

## 2025-01-15 RX ADMIN — MIDAZOLAM 2 MG: 1 INJECTION INTRAMUSCULAR; INTRAVENOUS at 10:58

## 2025-01-15 RX ADMIN — ACETAMINOPHEN 975 MG: 325 TABLET, FILM COATED ORAL at 13:15

## 2025-01-15 RX ADMIN — DEXAMETHASONE SODIUM PHOSPHATE 4 MG: 4 INJECTION, SOLUTION INTRA-ARTICULAR; INTRALESIONAL; INTRAMUSCULAR; INTRAVENOUS; SOFT TISSUE at 11:01

## 2025-01-15 RX ADMIN — SUGAMMADEX 200 MG: 100 INJECTION, SOLUTION INTRAVENOUS at 12:38

## 2025-01-15 RX ADMIN — PROPOFOL 200 MG: 10 INJECTION, EMULSION INTRAVENOUS at 11:01

## 2025-01-15 RX ADMIN — PHENYLEPHRINE HYDROCHLORIDE 100 MCG: 10 INJECTION INTRAVENOUS at 11:26

## 2025-01-15 RX ADMIN — Medication 2 G: at 10:58

## 2025-01-15 RX ADMIN — EPHEDRINE SULFATE 5 MG: 5 INJECTION INTRAVENOUS at 11:59

## 2025-01-15 RX ADMIN — EPHEDRINE SULFATE 5 MG: 5 INJECTION INTRAVENOUS at 12:12

## 2025-01-15 RX ADMIN — HYDROMORPHONE HYDROCHLORIDE 0.5 MG: 1 INJECTION, SOLUTION INTRAMUSCULAR; INTRAVENOUS; SUBCUTANEOUS at 11:20

## 2025-01-15 RX ADMIN — ROCURONIUM BROMIDE 20 MG: 50 INJECTION, SOLUTION INTRAVENOUS at 11:37

## 2025-01-15 RX ADMIN — LIDOCAINE HYDROCHLORIDE 50 MG: 20 INJECTION, SOLUTION INFILTRATION; PERINEURAL at 11:01

## 2025-01-15 RX ADMIN — EPHEDRINE SULFATE 5 MG: 5 INJECTION INTRAVENOUS at 12:19

## 2025-01-15 ASSESSMENT — ACTIVITIES OF DAILY LIVING (ADL)
ADLS_ACUITY_SCORE: 42
ADLS_ACUITY_SCORE: 42
ADLS_ACUITY_SCORE: 35
ADLS_ACUITY_SCORE: 42
ADLS_ACUITY_SCORE: 42
ADLS_ACUITY_SCORE: 43

## 2025-01-15 NOTE — ANESTHESIA POSTPROCEDURE EVALUATION
Patient: Castillo Allen    Procedure: Procedure(s):  Left Lumbar 4 to Lumbar 5 minimally invasive bilateral hemilaminectomy and medial facetectomy with resection of synovial cyst       Anesthesia Type:  General    Note:  Disposition: Outpatient   Postop Pain Control: Uneventful            Sign Out: Well controlled pain   PONV: No   Neuro/Psych: Uneventful            Sign Out: Acceptable/Baseline neuro status   Airway/Respiratory: Uneventful            Sign Out: Acceptable/Baseline resp. status   CV/Hemodynamics: Uneventful            Sign Out: Acceptable CV status; No obvious hypovolemia; No obvious fluid overload   Other NRE: NONE   DID A NON-ROUTINE EVENT OCCUR? No           Last vitals:  Vitals Value Taken Time   /89 01/15/25 1340   Temp 97.88  F (36.6  C) 01/15/25 1327   Pulse 105 01/15/25 1340   Resp 14 01/15/25 1323   SpO2 97 % 01/15/25 1342   Vitals shown include unfiled device data.    Electronically Signed By: Harris Blackwell MD  January 15, 2025  2:58 PM

## 2025-01-15 NOTE — DISCHARGE INSTRUCTIONS
Maximum acetaminophen (Tylenol) dose from all sources should not exceed 4 grams (4000 mg) per day. Last dose given at 1:15pm. Do not take any more acetaminophen products until 7:15 pm.   Maximum Ibuprofen/Motrin in 24 hours = 2,400 mg.    DR. TERESITA DOMINGO M.D.                    CLINIC PHONE NUMBER:  [phone]  [facility]

## 2025-01-15 NOTE — ANESTHESIA PROCEDURE NOTES
Airway       Patient location during procedure: OR       Procedure Start/Stop Times: 1/15/2025 11:02 AM  Staff -        CRNA: Jordi Prakash APRN CRNA       Performed By: CRNA  Consent for Airway        Urgency: elective  Indications and Patient Condition       Indications for airway management: regine-procedural       Induction type:intravenous       Mask difficulty assessment: 1 - vent by mask    Final Airway Details       Final airway type: endotracheal airway       Successful airway: ETT - single  Endotracheal Airway Details        ETT size (mm): 8.0       Cuffed: yes       Successful intubation technique: direct laryngoscopy       DL Blade Type: [place] 2       Grade View of Cords: 1       Adjucts: stylet       Position: Right       Measured from: lips       Secured at (cm): 23    Post intubation assessment        Placement verified by: capnometry, equal breath sounds and chest rise        Number of attempts at approach: 1       Number of other approaches attempted: 0       Secured with: tape       Ease of procedure: easy       Dentition: Intact and Unchanged    Medication(s) Administered   Medication Administration Time: 1/15/2025 11:02 AM

## 2025-01-15 NOTE — ANESTHESIA PREPROCEDURE EVALUATION
Anesthesia Pre-Procedure Evaluation    Patient: Castillo Allen   MRN: [id number] : 1965        Procedure : Procedure(s):  Left Lumbar 4 to Lumbar 5 minimally invasive bilateral hemilaminectomy and medial facetectomy with resection of synovial cyst          Past Medical History:   Diagnosis Date    Allergic rhinitis, cause unspecified     Depressive disorder     Situational at times    Hypertension 2020    Osteoarthritis of right knee, unspecified osteoarthritis type 2023      Past Surgical History:   Procedure Laterality Date    COLONOSCOPY      VASECTOMY        Allergies   Allergen Reactions    Seasonal Allergies       Social History     Tobacco Use    Smoking status: Never     Passive exposure: Never    Smokeless tobacco: Never   Substance Use Topics    Alcohol use: Yes     Alcohol/week: 1.0 standard drink of alcohol     Comment: Minimal, social      Wt Readings from Last 1 Encounters:   01/15/25 84.9 kg (187 lb 3.2 oz)        Anesthesia Evaluation            ROS/MED HX  ENT/Pulmonary:       Neurologic: Comment: Neuro claudication      Cardiovascular:     (+)  hypertension- -   -  - -                                      METS/Exercise Tolerance:     Hematologic: Comments: Lab Test        01/10/25     08/14/19     08/15/18     03/03/18                       1459          0956          0932          1139          WBC           --          7.4          6.6          7.0           HGB          15.4         15.3         15.1         15.6          MCV           --          86           86           86            PLT           --          263          275          279            Lab Test        01/10/25     10/10/24     11/21/23     09/29/23                       1459          0947          1258          0747          NA           137          138           --          137           POTASSIUM    4.4          4.2           --          4.0           CHLORIDE     99           102           --          103  "          CO2          26           25            --          25            BUN          21.5         20.0          --          17.4          CR           0.86         0.91          --          0.96          ANIONGAP     12           11            --          9             DIANNA          8.9          8.7*          --          8.9           GLC          89           107*         97           116*                Musculoskeletal:       GI/Hepatic:       Renal/Genitourinary:       Endo:       Psychiatric/Substance Use:       Infectious Disease:       Malignancy:       Other:            Physical Exam    Airway        Mallampati: I   TM distance: > 3 FB   Neck ROM: full   Mouth opening: > 3 cm    Respiratory Devices and Support         Dental       (+) Minor Abnormalities - some fillings, tiny chips      Cardiovascular   cardiovascular exam normal          Pulmonary   pulmonary exam normal                OUTSIDE LABS:  CBC:   Lab Results   Component Value Date    WBC 7.4 08/14/2019    WBC 6.6 08/15/2018    HGB 15.4 01/10/2025    HGB 15.3 08/14/2019    HCT 42.7 08/14/2019    HCT 42.4 08/15/2018     08/14/2019     08/15/2018     BMP:   Lab Results   Component Value Date     01/10/2025     10/10/2024    POTASSIUM 4.4 01/10/2025    POTASSIUM 4.2 10/10/2024    CHLORIDE 99 01/10/2025    CHLORIDE 102 10/10/2024    CO2 26 01/10/2025    CO2 25 10/10/2024    BUN 21.5 01/10/2025    BUN 20.0 10/10/2024    CR 0.86 01/10/2025    CR 0.91 10/10/2024    GLC 89 01/10/2025     (H) 10/10/2024     COAGS: No results found for: \"PTT\", \"INR\", \"FIBR\"  POC: No results found for: \"BGM\", \"HCG\", \"HCGS\"  HEPATIC:   Lab Results   Component Value Date    ALBUMIN 3.7 08/14/2019    PROTTOTAL 6.6 (L) 08/14/2019    ALT 27 08/14/2019    AST 18 08/14/2019    ALKPHOS 88 08/14/2019    BILITOTAL 0.9 08/14/2019     OTHER:   Lab Results   Component Value Date    A1C 5.3 10/10/2024    DIANNA 8.9 01/10/2025    TSH 1.71 08/14/2019 " "      Anesthesia Plan    ASA Status:  2    NPO Status:  NPO Appropriate    Anesthesia Type: General.     - Airway: ETT   Induction: Intravenous, Propofol.   Maintenance: Balanced.        Consents    Anesthesia Plan(s) and associated risks, benefits, and realistic alternatives discussed. Questions answered and patient/representative(s) expressed understanding.     - Discussed:     - Discussed with:  Patient      - Extended Intubation/Ventilatory Support Discussed: No.      - Patient is DNR/DNI Status: No     Use of blood products discussed: No .     Postoperative Care    Pain management: IV analgesics.   PONV prophylaxis: Dexamethasone or Solumedrol, Ondansetron (or other 5HT-3)     Comments:               Harris Blackwell MD    I have reviewed the pertinent notes and labs in the chart from the past 30 days and (re)examined the patient.  Any updates or changes from those notes are reflected in this note.                         # Overweight: Estimated body mass index is 29.45 kg/m  as calculated from the following:    Height as of this encounter: 1.698 m (5' 6.85\").    Weight as of this encounter: 84.9 kg (187 lb 3.2 oz).             "

## 2025-01-15 NOTE — OP NOTE
January 15, 2025    [facility]   Operative Note    Pre-operative diagnosis: Spinal stenosis of lumbar region with radiculopathy [M48.061, M54.16]  Synovial cyst of lumbar spine [M71.38]   Post-operative diagnosis Same   Procedure: Procedure(s):  Left Lumbar 4 to Lumbar 5 minimally invasive bilateral hemilaminectomy and medial facetectomy with resection of synovial cyst    Surgeon(s): Surgeons and Role:     * Antolin Kurtz MD - Primary     * Abi Castellanos APRN CNP   Estimated blood loss:  10ml   Specimens: * No specimens in log *   Findings: Bilateral L4-5 decompression performed.  Intraoperative durotomy noted repaired primarily without ongoing CSF leakage.         Indications: Patient is a 59-year-old male with back and left lower extremity symptoms with stenosis at L4-5 from facet hypertrophy and synovial cyst.  He is brought for the above-mentioned procedure. Please note that Abi Castellanos NP's assistance was needed for positioning, retraction, suctioning, and closure.     Description of procedure: Patient was brought to the operating room.  General endotracheal anesthesia was induced.  Patient was rolled into the prone position on the Clyde frame and the back was prepped and draped in a sterile fashion.  C-arm fluoroscopy was used to localize the appropriate level.  A left-sided approach was performed using the Metrix tubular dilating system and the level was again confirmed with fluoroscopy.  The microscope was sterilely draped and brought to the field.  The high-speed drill was used to perform a laminotomy and medial facetectomy on the left side.  The ligamentum flavum was elevated and resected decompressing the thecal sac underneath.  Durotomy was noted.  This was then repaired primarily with 6-0 suture.  The tube was then angled across to the right side and the lamina and medial facet on that side were undermined and the ligamentum flavum resected along with the synovial  cyst, decompressing the right lateral recess.  Once this was completed bilaterally, hemostasis was achieved.  Fascia was closed with 0 Vicryl.  2-0 Vicryl was used for the subcutaneous layer.  4-0 subcuticular Monocryl was used for skin with a superglue dressing.

## 2025-01-15 NOTE — ANESTHESIA CARE TRANSFER NOTE
Patient: Castillo Allen    Procedure: Procedure(s):  Left Lumbar 4 to Lumbar 5 minimally invasive bilateral hemilaminectomy and medial facetectomy with resection of synovial cyst       Diagnosis: Spinal stenosis of lumbar region with radiculopathy [M48.061, M54.16]  Synovial cyst of lumbar spine [M71.38]  Diagnosis Additional Information: No value filed.    Anesthesia Type:   General     Note:    Oropharynx: oropharynx clear of all foreign objects  Level of Consciousness: awake  Oxygen Supplementation: face mask  Level of Supplemental Oxygen (L/min / FiO2): 8  Independent Airway: airway patency satisfactory and stable  Dentition: dentition unchanged  Vital Signs Stable: post-procedure vital signs reviewed and stable  Report to RN Given: handoff report given  Patient transferred to: PACU    Handoff Report: Identifed the Patient, Identified the Reponsible Provider, Reviewed the pertinent medical history, Discussed the surgical course, Reviewed Intra-OP anesthesia mangement and issues during anesthesia, Set expectations for post-procedure period and Allowed opportunity for questions and acknowledgement of understanding      Vitals:  Vitals Value Taken Time   /82    Temp 98.24  F (36.8  C) 01/15/25 1249   Pulse 102 01/15/25 1249   Resp 10 01/15/25 1249   SpO2 100 % 01/15/25 1249   Vitals shown include unfiled device data.    Electronically Signed By: ANNMARIE Cabrera CRNA  January 15, 2025  12:50 PM

## 2025-01-16 ENCOUNTER — TELEPHONE (OUTPATIENT)
Dept: NEUROSURGERY | Facility: CLINIC | Age: 60
End: 2025-01-16
Payer: COMMERCIAL

## 2025-01-16 NOTE — TELEPHONE ENCOUNTER
"[facility] Call Center    Phone Message    May a detailed message be left on voicemail: yes     Reason for Call: Other: Patient called stating he thought he was able to shower today per the surgical team but his after visit summary states to wait 3 days. He is wondering if someone can provide clarification, he states he had \"super glue instead of bandages\". Patient also stated he had \"horrible insomnia\" last night, and slept for 3 hours, that he saw online that this could be common and he wonders if there are any suggestions for this as well. Please call to discuss.      Action Taken: Message routed to:  Other: [facility]    Travel Screening: Not Applicable     Date of Service:                                                                     "

## 2025-01-16 NOTE — CONFIDENTIAL NOTE
Last Visit: 1/15/25    Next Visit: 1/29/25    Name of Provider:  Dr. Kurtz    Assessment: S/p Left Lumbar 4 to Lumbar 5 minimally invasive bilateral hemilaminectomy and medial facetectomy with resection of synovial cyst on 1/15/25 by Dr. Kurtz.      Returned call to patient to further discuss.     Patient said feeling well overall, notes he had some stiffness/soreness in buttock yesterday when he got home. Notes almost all of the symptoms in his leg are gone except for some n/t in 1st and 2nd toes of left foot. He notes this is new post op. Pre op he had sporadic n/t on top of left foot .  Advised to CTM, its not uncommon to experience this. Discussed post op expectations and healing.    States he had insomnia last night, only 3 hours of sleep. May be due to the anesthesia. Reiterated to stay ahead of his pain as the anesthesia continues to wear off.     Taking Tylenol, oxy 5 mg Q6 hrs or as needed, ibuprofen, Robaxin 750 mg Q6 hrs, gabapentin 600 mg TID, ice. Advised not to take his voltaren if taking ibuprofen 800 mg.     Patient asked about showering today.     OK per Larry Carrion PA-C if patient would like to shower today.    Reviewed incision care.     Recommendation given:   -reviewed post op expectations, medication mgmt for pain, incision care.     Action needed from provider: no

## 2025-01-29 ENCOUNTER — ALLIED HEALTH/NURSE VISIT (OUTPATIENT)
Dept: NEUROSURGERY | Facility: CLINIC | Age: 60
End: 2025-01-29
Attending: NEUROLOGICAL SURGERY
Payer: COMMERCIAL

## 2025-01-29 ENCOUNTER — DOCUMENTATION ONLY (OUTPATIENT)
Dept: NEUROSURGERY | Facility: CLINIC | Age: 60
End: 2025-01-29

## 2025-01-29 VITALS
HEART RATE: 98 BPM | OXYGEN SATURATION: 97 % | SYSTOLIC BLOOD PRESSURE: 161 MMHG | DIASTOLIC BLOOD PRESSURE: 84 MMHG | TEMPERATURE: 98 F | RESPIRATION RATE: 18 BRPM

## 2025-01-29 DIAGNOSIS — Z98.890 S/P SPINAL SURGERY: Primary | ICD-10-CM

## 2025-01-29 PROCEDURE — 99207 PR NO CHARGE NURSE ONLY: CPT

## 2025-01-29 NOTE — PROGRESS NOTES
Follow-up to patients marcos Rodriguez PA-C stated:    No lifting >10 lbs   Ok elliptical if it is light activity similar to a walk (not increasing heart rate or sweating or short of breath with exertion)     Updated patient via [employer].

## 2025-01-29 NOTE — PROGRESS NOTES
Post-op Nurse Visit:    Patient seen today per the order of  Antolin Kurtz MD.   DOS: 1/15/25  Procedure: Left Lumbar 4 to Lumbar 5 minimally invasive bilateral hemilaminectomy and medial facetectomy with resection of synovial cyst     Pain/Neuro Assessment  Pain is currently a 1/10 near the incision. Described as tender. Burning in left thigh has resolved.  No new symptoms since surgery.   Numbness/tingling: some continued n/t in left foot   Strength: Equal strength to bilateral lower extremities. Denies weakness.     Pain Relief Measures:  Oxycodone: not needed, used for 2 days after surgery  Tylenol: not needed  Robaxin: not needed  Ice: PRN     Incision   Incision inspected. Edges well-approximated. No redness, swelling, drainage, or warmth noted.     Patient reports he is going on vacation in 1 1/2 weeks and wonders if he will be able to swim. Reviewed he can send us an incision picture prior to leaving and we can review with provider team.     Patient also wondering if he can use an elliptical and do seated weight lifting. Reviewed typically all other activities are deferred until 6 weeks, but we can review with team.     Activity  Following restrictions   Falls:  none  Patient is walking frequently without difficulty.   Denies redness, swelling, or warmth to bilateral calves.     GI/  Difficulty swallowing? No  Patient's appetite is normal  Bowel/bladder problems? No  Taking stool softeners? No     Refills/x-rays/return to work  Refills given at this appointment? No  Sent for x-rays after this appointment? No  Ordered future x-rays? No  Return to work discussed at this appointment? Yes, no forms or letters needed. Works part time on call.     All of patient's questions addressed today. Patient was instructed to call with any additional questions/concerns.

## 2025-01-29 NOTE — PATIENT INSTRUCTIONS
Instructions for Patient    Incision  Keep your incision clean and dry at all times.   It is okay to shower, just pat the incision dry   No submerging incision in water such as pools, hot tubs, or baths for at least 8 weeks and until the incision is healed  Do not apply lotions or ointments to incision    Activity  No lifting greater than 10 pounds. Limited bending, twisting, or overhead reaching.  Walking is the best way to start exercise after surgery. Take short frequent walks. You may gradually increase the distance as tolerated. If you feel pain, decrease your activity, but DO NOT stop walking. Walking will help you gain strength, prevent muscle soreness and spasms, and prevent blood clots.   Avoid bed rest and prolonged sitting for longer than 30 minutes (change positions frequently while awake)  No contact sports or high impact activities such as; running/jogging, snowmobile or 4 osborne riding or any other recreational vehicles until after given clearance at one of your follow up visits    Medications   Refills of pain medication:   Please call the neurosurgery clinic to request 2-3 days before you run out. A nurse will call you back to obtain a pain assessment.   Weaning from narcotic pain medications  When it is time, start weaning by extending the time between doses.   For example, if you're taking 2 tabs every 4 hours, spread it out to 2 tabs over 4.5, 5, 6 hours. At that point you can certainly cut down to 1 tab, then wean to an as needed basis until completely done with them.  Don't take more than 3000mg of Acetaminophen in 24 hours  Ok to begin taking Aspirin and NSAIDs (ex: ibuprofen/Advil)  Encouraged icing for at least 3-4 times throughout the day for 20-30 minutes at a time. Avoid heat to the incision area.   Taking stool softeners regularly can reduce constipation commonly caused by narcotic pain medications.    Contact clinic or Emergency Room if you develop:   Infection (redness, swelling,  warmth, drainage, fever over 101 F)  New injury  Bladder or bowel changes or loss of control    Signs of blood clot:  Swelling and/or warmth in one or both legs  Pain or tenderness in your leg, ankle, foot, or arm   Red or discolored skin     Go to the Emergency Room   If sudden onset of severe headache, weakness, confusion, change in level of consciousness, pain, or loss of movement.  Chest pain  Trouble breathing     Post-operative appointments  6 week and 3 months post-op    [facility]  [place] [address] [place] [id number]  Telephone:  [phone]  Fax:  [phone]

## 2025-02-06 ENCOUNTER — TELEPHONE (OUTPATIENT)
Dept: NEUROSURGERY | Facility: CLINIC | Age: 60
End: 2025-02-06
Payer: COMMERCIAL

## 2025-02-06 NOTE — TELEPHONE ENCOUNTER
[facility] Call Center    Phone Message    May a detailed message be left on voicemail: yes     Reason for Call: Other: Pt unable to use [employer] to send pictures of his incision. Pt needs confirmation if incision can be submerged in water and wondering if he can stop into the clinic to show a RN the pictures. Pt has his vacation in the next few days. Please contact pt as he is willing to come in today if able.      Action Taken: Other:  Neurosurgery    Travel Screening: Not Applicable     Date of Service:

## 2025-02-06 NOTE — TELEPHONE ENCOUNTER
Patient is 3 weeks 1 day s/p Left Lumbar 4 to Lumbar 5 minimally invasive bilateral hemilaminectomy and medial facetectomy with resection of synovial cyst on 1/15/25 with Dr. Kurtz.    Called patient. He is leaving for vacation very early Saturday morning and requesting clearance for swimming in the ocean. [employer] is having difficulty with photos being uploaded per patient.     Patient will send photo via [employer] to be reviewed by the team. He is wondering if he will need to buy some tegaderm or something similar to cover incision while swimming.     Photos received. Encounter routed to provider team for review.

## 2025-02-25 ENCOUNTER — OFFICE VISIT (OUTPATIENT)
Dept: FAMILY MEDICINE | Facility: CLINIC | Age: 60
End: 2025-02-25
Payer: COMMERCIAL

## 2025-02-25 VITALS
OXYGEN SATURATION: 98 % | RESPIRATION RATE: 18 BRPM | DIASTOLIC BLOOD PRESSURE: 80 MMHG | HEART RATE: 98 BPM | SYSTOLIC BLOOD PRESSURE: 146 MMHG | TEMPERATURE: 98 F | WEIGHT: 192.7 LBS | BODY MASS INDEX: 30.25 KG/M2 | HEIGHT: 67 IN

## 2025-02-25 DIAGNOSIS — I10 ESSENTIAL HYPERTENSION: Primary | ICD-10-CM

## 2025-02-25 PROCEDURE — G2211 COMPLEX E/M VISIT ADD ON: HCPCS | Performed by: PHYSICIAN ASSISTANT

## 2025-02-25 PROCEDURE — 99214 OFFICE O/P EST MOD 30 MIN: CPT | Performed by: PHYSICIAN ASSISTANT

## 2025-02-25 PROCEDURE — 3077F SYST BP >= 140 MM HG: CPT | Performed by: PHYSICIAN ASSISTANT

## 2025-02-25 PROCEDURE — 3079F DIAST BP 80-89 MM HG: CPT | Performed by: PHYSICIAN ASSISTANT

## 2025-02-25 PROCEDURE — 1126F AMNT PAIN NOTED NONE PRSNT: CPT | Performed by: PHYSICIAN ASSISTANT

## 2025-02-25 RX ORDER — OLMESARTAN MEDOXOMIL 5 MG/1
5 TABLET ORAL DAILY
Qty: 90 TABLET | Refills: 0 | Status: SHIPPED | OUTPATIENT
Start: 2025-02-25

## 2025-02-25 ASSESSMENT — PAIN SCALES - GENERAL: PAINLEVEL_OUTOF10: NO PAIN (0)

## 2025-02-25 NOTE — PROGRESS NOTES
"  Assessment & Plan     Essential hypertension  Likely related to weight gain and inactivity but there is some genetic hx as well. Start below. Follow-up bmp and nurse bp check in 2 weeks. Down the road as he gets more active following his surgery we can consider peeling away  - olmesartan (BENICAR) 5 MG tablet; Take 1 tablet (5 mg) by mouth daily.  - Basic metabolic panel  (Ca, Cl, CO2, Creat, Gluc, K, Na, BUN); Future    The longitudinal plan of care for the diagnosis(es)/condition(s) as documented were addressed during this visit. Due to the added complexity in care, I will continue to support Ry in the subsequent management and with ongoing continuity of care.        BMI  Estimated body mass index is 30.18 kg/m  as calculated from the following:    Height as of this encounter: 1.702 m (5' 7\").    Weight as of this encounter: 87.4 kg (192 lb 11.2 oz).           Subjective   Ry is a 60 year old, presenting for the following health issues:  Hypertension        2/25/2025    11:22 AM   Additional Questions   Roomed by Shanell PETERS CMA   Accompanied by MELANY         2/25/2025    11:22 AM   Patient Reported Additional Medications   Patient reports taking the following new medications None     History of Present Illness       Reason for visit:  Recent high blood pressure  Symptom onset:  More than a month  Symptoms include:  High blood pressure  Symptom intensity:  Mild  Symptom progression:  Staying the same  Had these symptoms before:  No  What makes it worse:  No  What makes it better:  No   He is taking medications regularly.       Castillo Allen is a 60 year old male who presents today for discussion re BP elevation  Recently with a prolonged radicular back pain that required surgery - now doing really well  However because of this he has gained 15-20lbs because he could not exercise  No major dietary changes  Limits caffeine  No increased sodium  Denies any head/ear pressure; no vision changes; no chest pain  Has had " "more difficult time sleeping          Review of Systems  Constitutional, HEENT, cardiovascular, pulmonary, gi and gu systems are negative, except as otherwise noted.      Objective    BP (!) 162/82 (BP Location: Right arm, Patient Position: Sitting, Cuff Size: Adult Large)   Pulse 98   Temp 98  F (36.7  C) (Oral)   Resp 18   Ht 1.702 m (5' 7\")   Wt 87.4 kg (192 lb 11.2 oz)   SpO2 98%   BMI 30.18 kg/m    Body mass index is 30.18 kg/m .  Physical Exam   GENERAL: alert and no distress  EYES: Eyes grossly normal to inspection, PERRL and conjunctivae and sclerae normal  RESP: lungs clear to auscultation - no rales, rhonchi or wheezes  CV: regular rate and rhythm, normal S1 S2, no S3 or S4, no murmur, click or rub, no peripheral edema  PSYCH: mentation appears normal, affect normal/bright          Signed Electronically by: Nicolas Escobedo PA-C    "

## 2025-02-26 ENCOUNTER — OFFICE VISIT (OUTPATIENT)
Dept: NEUROSURGERY | Facility: CLINIC | Age: 60
End: 2025-02-26
Attending: PHYSICIAN ASSISTANT
Payer: COMMERCIAL

## 2025-02-26 VITALS — HEART RATE: 112 BPM | DIASTOLIC BLOOD PRESSURE: 76 MMHG | SYSTOLIC BLOOD PRESSURE: 129 MMHG | OXYGEN SATURATION: 97 %

## 2025-02-26 DIAGNOSIS — Z98.890 S/P SPINAL SURGERY: Primary | ICD-10-CM

## 2025-02-26 PROCEDURE — 99213 OFFICE O/P EST LOW 20 MIN: CPT | Performed by: NURSE PRACTITIONER

## 2025-02-26 ASSESSMENT — PAIN SCALES - GENERAL: PAINLEVEL_OUTOF10: NO PAIN (0)

## 2025-02-26 NOTE — PATIENT INSTRUCTIONS
-Okay to begin to increase activity as tolerated. Okay to add an additional 2-5lbs/week.  -Feel free to call our office with any new concerns.  -Follow up with Dr. Kurtz as scheduled.    Corinne Fanta MSN, AGACN-[facility]  [facility]  Tel [phone]

## 2025-02-26 NOTE — LETTER
2/26/2025      Castillo Allen  [address]  [place] [address]      Dear Colleague,    Thank you for referring your patient, Castillo Allen, to the [facility] [place]. Please see a copy of my visit note below.    NEUROSURGERY CLINIC CONSULT NOTE    DATE OF VISIT: 2/26/2025    HPI: Castillo Allen is a pleasant 60 year old male who presents to the clinic today for a 6-week post-operative follow-up visit. On 1/15/25 the patient underwent a L4-L5 minimally invasive bilateral hemilaminectomy and medial facetectomy with resection of synovial cyst  with Dr. Kurtz for back and LLE symptoms with stenosis at L4-5 from facet hypertrophy and synovial cyst.    Per chart review, the patient's pre-operative symptoms consisted of pain in the bilateral gluteal region and radiates in an L5 distribution down the left lower extremity with numbness and tingling along the top of the foot and lateral calf and burning in the lateral thigh on the left.      Today, the patient reports complete resolution of all pre-operative symptoms. 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 Patient is eager to get back into the gym. Denies any paraesthesia, numbness, and/or new perceived weakness. Denies any changes in his gait/balance. Patient with no other concerns at this time.     Current Outpatient Medications   Medication Sig Dispense Refill     olmesartan (BENICAR) 5 MG tablet Take 1 tablet (5 mg) by mouth daily. 90 tablet 0     tamsulosin (FLOMAX) 0.4 MG capsule Take 0.4 mg by mouth daily.       No current facility-administered medications for this visit.     Allergies   Allergen Reactions     Seasonal Allergies      Past Medical History:   Diagnosis Date     Allergic rhinitis, cause unspecified      Depressive disorder     Situational at times     Hypertension 11/01/2020     Osteoarthritis of right knee, unspecified  osteoarthritis type 11/09/2023     Review Of Systems  10 point ROS neg other than the symptoms noted above in the HPI.    OBJECTIVE:    /76   Pulse 112   SpO2 97%     Exam:  CN II-XII grossly intact, alert and appropriate with conversation and following commands.   Gait is non-antalgic. Able to walk on toes and heels without difficulty.  Intact sensation throughout lower extremities.   Bilateral patellar 2/4 and achilles reflex 1/4.     LE muscle strength  Right  Left    Iliopsoas (hip flexion)  5/5  5/5    Quad (knee extension)  5/5  5/5    Hamstring (knee flexion)  5/5  5/5    Gastrocnemius (PF)  5/5  5/5    Tibialis Ant. (DF)  5/5  5/5      Negative for clonus.   Lumbar incision edges well approximated. Absent for edema, erythema, or drainage.    ASSESSMENT:    1. S/P spinal surgery      PLAN:    -Okay to begin to increase activity as tolerated. Okay to add an additional 2-5 lbs/week.  -Should a new PT referral be needed, feel free to call our office and we can order at that time.   -Please feel free to call our office with any new concerns.  -Follow up with Dr. Kurtz as scheduled.    Corinne Fanta MSN, AGACNP-[facility]  [facility]  Tel [phone]      Again, thank you for allowing me to participate in the care of your patient.        Sincerely,        Corinne E. Fanta, ANNMARIE CNP    Electronically signed

## 2025-02-26 NOTE — NURSING NOTE
"Castillo Allen is a 60 year old male who presents for:  Chief Complaint   Patient presents with    Surgical Followup     6 week follow-up          Vitals:    Vitals:    02/26/25 1322   BP: 129/76   Pulse: 112   SpO2: 97%       BMI:  Estimated body mass index is 30.18 kg/m  as calculated from the following:    Height as of 2/25/25: 5' 7\" (1.702 m).    Weight as of 2/25/25: 192 lb 11.2 oz (87.4 kg).    Pain Score:  No Pain (0)        Yeno Tanner      "

## 2025-02-26 NOTE — PROGRESS NOTES
NEUROSURGERY CLINIC CONSULT NOTE    DATE OF VISIT: 2/26/2025    HPI: Castillo Allen is a pleasant 60 year old male who presents to the clinic today for a 6-week post-operative follow-up visit. On 1/15/25 the patient underwent a L4-L5 minimally invasive bilateral hemilaminectomy and medial facetectomy with resection of synovial cyst  with Dr. Kurtz for back and LLE symptoms with stenosis at L4-5 from facet hypertrophy and synovial cyst.    Per chart review, the patient's pre-operative symptoms consisted of pain in the bilateral gluteal region and radiates in an L5 distribution down the left lower extremity with numbness and tingling along the top of the foot and lateral calf and burning in the lateral thigh on the left.      Today, the patient reports complete resolution of all pre-operative symptoms. 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 Patient is eager to get back into the gym. Denies any paraesthesia, numbness, and/or new perceived weakness. Denies any changes in his gait/balance. Patient with no other concerns at this time.     Current Outpatient Medications   Medication Sig Dispense Refill    olmesartan (BENICAR) 5 MG tablet Take 1 tablet (5 mg) by mouth daily. 90 tablet 0    tamsulosin (FLOMAX) 0.4 MG capsule Take 0.4 mg by mouth daily.       No current facility-administered medications for this visit.     Allergies   Allergen Reactions    Seasonal Allergies      Past Medical History:   Diagnosis Date    Allergic rhinitis, cause unspecified     Depressive disorder     Situational at times    Hypertension 11/01/2020    Osteoarthritis of right knee, unspecified osteoarthritis type 11/09/2023     Review Of Systems  10 point ROS neg other than the symptoms noted above in the HPI.    OBJECTIVE:    /76   Pulse 112   SpO2 97%     Exam:  CN II-XII grossly intact, alert and appropriate with conversation and following commands.    Gait is non-antalgic. Able to walk on toes and heels without difficulty.  Intact sensation throughout lower extremities.   Bilateral patellar 2/4 and achilles reflex 1/4.     LE muscle strength  Right  Left    Iliopsoas (hip flexion)  5/5  5/5    Quad (knee extension)  5/5  5/5    Hamstring (knee flexion)  5/5  5/5    Gastrocnemius (PF)  5/5  5/5    Tibialis Ant. (DF)  5/5  5/5      Negative for clonus.   Lumbar incision edges well approximated. Absent for edema, erythema, or drainage.    ASSESSMENT:    1. S/P spinal surgery      PLAN:    -Okay to begin to increase activity as tolerated. Okay to add an additional 2-5 lbs/week.  -Should a new PT referral be needed, feel free to call our office and we can order at that time.   -Please feel free to call our office with any new concerns.  -Follow up with Dr. Kurtz as scheduled.    Corinne Fanta MSN, AGACNP-BC  [facility]  [facility]  Tel [phone]

## 2025-03-10 ENCOUNTER — TELEPHONE (OUTPATIENT)
Dept: FAMILY MEDICINE | Facility: CLINIC | Age: 60
End: 2025-03-10

## 2025-03-10 ENCOUNTER — OFFICE VISIT (OUTPATIENT)
Dept: FAMILY MEDICINE | Facility: CLINIC | Age: 60
End: 2025-03-10
Payer: COMMERCIAL

## 2025-03-10 VITALS
TEMPERATURE: 99.7 F | SYSTOLIC BLOOD PRESSURE: 108 MMHG | BODY MASS INDEX: 30.13 KG/M2 | WEIGHT: 192 LBS | DIASTOLIC BLOOD PRESSURE: 62 MMHG | HEIGHT: 67 IN | RESPIRATION RATE: 16 BRPM | OXYGEN SATURATION: 97 % | HEART RATE: 113 BPM

## 2025-03-10 DIAGNOSIS — J11.1 INFLUENZA: Primary | ICD-10-CM

## 2025-03-10 DIAGNOSIS — J02.9 SORE THROAT: ICD-10-CM

## 2025-03-10 DIAGNOSIS — I10 ESSENTIAL HYPERTENSION: ICD-10-CM

## 2025-03-10 LAB
ANION GAP SERPL CALCULATED.3IONS-SCNC: 11 MMOL/L (ref 7–15)
BUN SERPL-MCNC: 11.7 MG/DL (ref 8–23)
CALCIUM SERPL-MCNC: 9.1 MG/DL (ref 8.8–10.4)
CHLORIDE SERPL-SCNC: 101 MMOL/L (ref 98–107)
CREAT SERPL-MCNC: 0.88 MG/DL (ref 0.67–1.17)
EGFRCR SERPLBLD CKD-EPI 2021: >90 ML/MIN/1.73M2
GLUCOSE SERPL-MCNC: 140 MG/DL (ref 70–99)
HCO3 SERPL-SCNC: 25 MMOL/L (ref 22–29)
POTASSIUM SERPL-SCNC: 4.1 MMOL/L (ref 3.4–5.3)
SODIUM SERPL-SCNC: 137 MMOL/L (ref 135–145)

## 2025-03-10 PROCEDURE — 80048 BASIC METABOLIC PNL TOTAL CA: CPT | Performed by: PHYSICIAN ASSISTANT

## 2025-03-10 PROCEDURE — 36415 COLL VENOUS BLD VENIPUNCTURE: CPT | Performed by: PHYSICIAN ASSISTANT

## 2025-03-10 PROCEDURE — G2211 COMPLEX E/M VISIT ADD ON: HCPCS | Performed by: PHYSICIAN ASSISTANT

## 2025-03-10 PROCEDURE — 3074F SYST BP LT 130 MM HG: CPT | Performed by: PHYSICIAN ASSISTANT

## 2025-03-10 PROCEDURE — 1125F AMNT PAIN NOTED PAIN PRSNT: CPT | Performed by: PHYSICIAN ASSISTANT

## 2025-03-10 PROCEDURE — 3078F DIAST BP <80 MM HG: CPT | Performed by: PHYSICIAN ASSISTANT

## 2025-03-10 PROCEDURE — 99213 OFFICE O/P EST LOW 20 MIN: CPT | Performed by: PHYSICIAN ASSISTANT

## 2025-03-10 RX ORDER — METHYLPREDNISOLONE 4 MG/1
TABLET ORAL
Qty: 21 TABLET | Refills: 0 | Status: SHIPPED | OUTPATIENT
Start: 2025-03-10

## 2025-03-10 ASSESSMENT — PATIENT HEALTH QUESTIONNAIRE - PHQ9
SUM OF ALL RESPONSES TO PHQ QUESTIONS 1-9: 3
SUM OF ALL RESPONSES TO PHQ QUESTIONS 1-9: 3
10. IF YOU CHECKED OFF ANY PROBLEMS, HOW DIFFICULT HAVE THESE PROBLEMS MADE IT FOR YOU TO DO YOUR WORK, TAKE CARE OF THINGS AT HOME, OR GET ALONG WITH OTHER PEOPLE: NOT DIFFICULT AT ALL

## 2025-03-10 ASSESSMENT — PAIN SCALES - GENERAL: PAINLEVEL_OUTOF10: MODERATE PAIN (4)

## 2025-03-10 NOTE — PROGRESS NOTES
"  Assessment & Plan     Influenza  Sore throat  Trial of adding below to help with ST, continue OTC. Follow-up if symptoms not improving.   - methylPREDNISolone (MEDROL DOSEPAK) 4 MG tablet therapy pack; Follow Package Directions    Essential hypertension  Controlled on new Rx. Updating bmp  - Basic metabolic panel  (Ca, Cl, CO2, Creat, Gluc, K, Na, BUN)          BMI  Estimated body mass index is 30.07 kg/m  as calculated from the following:    Height as of this encounter: 1.702 m (5' 7\").    Weight as of this encounter: 87.1 kg (192 lb).           Ashvin Raza is a 60 year old, presenting for the following health issues:  Sick        3/10/2025     9:46 AM   Additional Questions   Roomed by neli     History of Present Illness       Reason for visit:  Sore throat from flu  Symptom onset:  1-3 days ago  Symptoms include:  Fatigue and cough (improving), sore throat and ear ache (not improving)  Symptom intensity:  Severe  Symptom progression:  Staying the same  Had these symptoms before:  No  What makes it worse:  Swallowing  What makes it better:  No   He is taking medications regularly.    Castillo Allen is a 60 year old male who presents today for ongoing influenza symptoms  Began with cough on Friday and then developed fatigue, sore throat  There is also some congestion/nasal drainage  Ears have a pressure/aching as well  Cough slightly improved; dry    Last night only slept an hour; sore throat so bad    No other major concerns today  Tolerating omelsartan well             Review of Systems  Constitutional, HEENT, cardiovascular, pulmonary, gi and gu systems are negative, except as otherwise noted.      Objective    /62   Pulse 113   Temp 99.7  F (37.6  C) (Oral)   Resp 16   Ht 1.702 m (5' 7\")   Wt 87.1 kg (192 lb)   SpO2 97%   BMI 30.07 kg/m    Body mass index is 30.07 kg/m .  Physical Exam   GENERAL: alert and no distress  EYES: Eyes grossly normal to inspection, PERRL and conjunctivae and sclerae " normal  HENT: ear canals and TM's normal, nose and mouth without ulcers or lesions  NECK: tender cervical adenopathy, no asymmetry, masses, or scars, and thyroid normal to palpation  RESP: lungs clear to auscultation - no rales, rhonchi or wheezes  CV: regular rate and rhythm, normal S1 S2, no S3 or S4, no murmur, click or rub, no peripheral edema          Signed Electronically by: Nicolas Escobedo PA-C

## 2025-03-10 NOTE — TELEPHONE ENCOUNTER
Patient calling with ear pain.  He states his son had influenza A last week and Ry got sick Friday.  He is having bilateral ear pain with (L) more than (R).  Cough improving.  All other symptoms are improving except sore throat is bad.  Throat pain is staying the same.  Only got about an hour of sleep.  Scheduled with Toby at 9:40 am.  Advised to wear a mask.  Patient agrees with plan.  Melita Smith RN

## 2025-03-11 DIAGNOSIS — N40.0 ENLARGED PROSTATE: Primary | ICD-10-CM

## 2025-03-12 RX ORDER — TAMSULOSIN HYDROCHLORIDE 0.4 MG/1
0.4 CAPSULE ORAL DAILY
Qty: 90 CAPSULE | Refills: 3 | Status: SHIPPED | OUTPATIENT
Start: 2025-03-12

## 2025-03-12 NOTE — TELEPHONE ENCOUNTER
Pt confirms he is taking this medication. Stated that it comes from [facility] from urology, so he does not know why it was sent to us. Advised pt that if he is wanting this filled through us that he will need to make an appointment. Pt states he would like to continue with Dr. Rutherford.    Will send over to [facility] for refill.     Mattie Delgado, ANIKAN, RN     [facility]    03/12/2025 at 1:04 PM

## 2025-03-13 ENCOUNTER — MYC MEDICAL ADVICE (OUTPATIENT)
Dept: FAMILY MEDICINE | Facility: CLINIC | Age: 60
End: 2025-03-13
Payer: COMMERCIAL

## 2025-03-13 NOTE — PROGRESS NOTES
Subjective:   Castillo Allen is a 53 year old male who presents for   Chief Complaint   Patient presents with     Urgent Care     Pharyngitis     Possible strep started last night- sore throat, redness   53-year-old gentleman who presents today for throat discomfort which started yesterday.  He says that when he swallows food there is a feeling of discomfort but it is not sharp pain.  Does not recall any injury to the mouth.  Wife is sick with a cold.  He also has nasal congestion but no cough and denies fevers.    PMHX/PSHX/MEDS/ALLERGIES/SHX/FHX reviewed and updated in Epic.    Patient Active Problem List    Diagnosis Date Noted     Brachial neuritis or radiculitis 10/02/2013     Priority: Medium     Problem list name updated by automated process. Provider to review       CARDIOVASCULAR SCREENING; LDL GOAL LESS THAN 160 02/10/2010     Priority: Medium     Lumbago 12/17/2009     Priority: Medium     Nonallopathic lesion of sacral region 12/17/2009     Priority: Medium     Problem list name updated by automated process. Provider to review       Nonallopathic lesion of lower extremities 12/17/2009     Priority: Medium     Problem list name updated by automated process. Provider to review       Hypertrophy of breast 05/05/2006     Priority: Medium     Allergic rhinitis      Priority: Medium     Problem list name updated by automated process. Provider to review         Current Outpatient Prescriptions   Medication     escitalopram (LEXAPRO) 20 MG tablet     IBUPROFEN PO     ALPRAZolam (XANAX) 0.25 MG tablet     tamsulosin (FLOMAX) 0.4 MG capsule     No current facility-administered medications for this visit.        ROS:  As above per HPI    Objective:   /74 (BP Location: Right arm, Patient Position: Chair, Cuff Size: Adult Regular)  Pulse 99  Temp 98.1  F (36.7  C) (Oral)  SpO2 95%, There is no height or weight on file to calculate BMI.  Gen:  NAD, well-nourished, sitting in chair comfortably, very  pleasant  HEENT: EOMI, PERRL sclera anicteric, Head normocephalic, ; nares patent; oropharynx pink and moist, uvula is midline and swollen but not impressively red, tonsils 1+ without exudates, no trismus, no peritonsillar abscess, normal phonation without hoarseness  Neck: trachea midline, supple with lymphadenopathy or masses  CV:  RRR  - no murmurs, rubs, or gallups,   Pulm:  CTAB, no wheezes/rales/rhonchi, no increased work of breathing   Extrem: no cyanosis, edema or clubbing  Skin: no obvious rashes or abnormalities  Psych: Euthymic, linear thoughts, normal rate of speech       Results for orders placed or performed in visit on 03/03/18   CBC with platelets and differential   Result Value Ref Range    WBC 7.0 4.0 - 11.0 10e9/L    RBC Count 5.01 4.4 - 5.9 10e12/L    Hemoglobin 15.6 13.3 - 17.7 g/dL    Hematocrit 43.3 40.0 - 53.0 %    MCV 86 78 - 100 fl    MCH 31.1 26.5 - 33.0 pg    MCHC 36.0 31.5 - 36.5 g/dL    RDW 12.6 10.0 - 15.0 %    Platelet Count 279 150 - 450 10e9/L    Diff Method Automated Method     % Neutrophils 58.8 %    % Lymphocytes 27.8 %    % Monocytes 8.2 %    % Eosinophils 4.8 %    % Basophils 0.4 %    Absolute Neutrophil 4.1 1.6 - 8.3 10e9/L    Absolute Lymphocytes 2.0 0.8 - 5.3 10e9/L    Absolute Monocytes 0.6 0.0 - 1.3 10e9/L    Absolute Eosinophils 0.3 0.0 - 0.7 10e9/L    Absolute Basophils 0.0 0.0 - 0.2 10e9/L   Rapid strep screen   Result Value Ref Range    Specimen Description Throat     Rapid Strep A Screen       NEGATIVE: No Group A streptococcal antigen detected by immunoassay, await culture report.       Assessment & Plan:   Castillo MICHELLE Allen, 53 year old male who presents with:  Swollen uvula  This is likely from trauma or an allergic response of some sort given the history, exam, and the negative rapid strep. Recommending a trial of antihistamine for next couple of days. Will draw blood to see if there is an increased eosinophilic presence. Patient educated to observe for signs of  worsening infection.   - CBC with platelets and differential  - Rapid strep screen  - Beta strep group A culture    Roger Newman MD PGY3  [phone] (Pager)   [facility] [place]    Options for treatment and/or follow-up care were reviewed with the patient. Castillo Allen and/or legal guardian was engaged and actively involved in the decision making process. Patient/guardian verbalized understanding of the options discussed and was satisfied with the final plan.   [phone]

## 2025-03-15 ENCOUNTER — ANCILLARY PROCEDURE (OUTPATIENT)
Dept: GENERAL RADIOLOGY | Facility: CLINIC | Age: 60
End: 2025-03-15
Attending: PHYSICIAN ASSISTANT
Payer: COMMERCIAL

## 2025-03-15 ENCOUNTER — OFFICE VISIT (OUTPATIENT)
Dept: URGENT CARE | Facility: URGENT CARE | Age: 60
End: 2025-03-15
Payer: COMMERCIAL

## 2025-03-15 VITALS
HEART RATE: 107 BPM | TEMPERATURE: 98.4 F | SYSTOLIC BLOOD PRESSURE: 116 MMHG | OXYGEN SATURATION: 97 % | DIASTOLIC BLOOD PRESSURE: 66 MMHG | WEIGHT: 192 LBS | BODY MASS INDEX: 30.07 KG/M2 | RESPIRATION RATE: 16 BRPM

## 2025-03-15 DIAGNOSIS — R05.1 ACUTE COUGH: ICD-10-CM

## 2025-03-15 DIAGNOSIS — R06.2 WHEEZING: ICD-10-CM

## 2025-03-15 DIAGNOSIS — H65.92 OME (OTITIS MEDIA WITH EFFUSION), LEFT: Primary | ICD-10-CM

## 2025-03-15 PROCEDURE — 99213 OFFICE O/P EST LOW 20 MIN: CPT | Performed by: PHYSICIAN ASSISTANT

## 2025-03-15 PROCEDURE — 3074F SYST BP LT 130 MM HG: CPT | Performed by: PHYSICIAN ASSISTANT

## 2025-03-15 PROCEDURE — 71046 X-RAY EXAM CHEST 2 VIEWS: CPT | Mod: TC | Performed by: RADIOLOGY

## 2025-03-15 PROCEDURE — 3078F DIAST BP <80 MM HG: CPT | Performed by: PHYSICIAN ASSISTANT

## 2025-03-15 RX ORDER — PREDNISONE 20 MG/1
40 TABLET ORAL DAILY
Qty: 10 TABLET | Refills: 0 | Status: SHIPPED | OUTPATIENT
Start: 2025-03-15 | End: 2025-03-20

## 2025-03-15 NOTE — PROGRESS NOTES
SUBJECTIVE:  Castillo Allen is a 60 year old male who comes in with concerns for continued and worsening URI related symptoms.  Patient started with cough and cold like symptoms for approximately 10-day.  Just prior to this his son was diagnosed with influenza A.  He assumed that he also had.  Did have some low-grade fevers on occasion.  Sore throat and cough seem to be getting worse and he was seen by his primary.  Was given a Medrol Dosepak at that time which she just completed today.  Did help with his throat pain but cough is just has been seems to be getting worse again.  Sore throat now starting to be more sore today and left ear feels completely plugged.  He does not have any under lying asthma or heart conditions but does feel slightly short of breath and tight in the chest with any activity.  Has been using over-the-counter meds for symptomatic relief.      Past Medical History:   Diagnosis Date    Allergic rhinitis, cause unspecified     Depressive disorder     Situational at times    Hypertension 11/01/2020    Osteoarthritis of right knee, unspecified osteoarthritis type 11/09/2023     Patient Active Problem List   Diagnosis    Allergic rhinitis    Hypertrophy of breast    Lumbago    Nonallopathic lesion of sacral region    Nonallopathic lesion of lower extremities    CARDIOVASCULAR SCREENING; LDL GOAL LESS THAN 160    Brachial neuritis or radiculitis     Current Outpatient Medications   Medication Sig Dispense Refill    olmesartan (BENICAR) 5 MG tablet Take 1 tablet (5 mg) by mouth daily. 90 tablet 0    tamsulosin (FLOMAX) 0.4 MG capsule Take 1 capsule (0.4 mg) by mouth daily. 90 capsule 3     No current facility-administered medications for this visit.     Social History     Socioeconomic History    Marital status:      Spouse name: Not on file    Number of children: Not on file    Years of education: Not on file    Highest education level: Not on file   Occupational History    Not on file    Tobacco Use    Smoking status: Never     Passive exposure: Never    Smokeless tobacco: Never   Vaping Use    Vaping status: Never Used   Substance and Sexual Activity    Alcohol use: Yes     Alcohol/week: 1.0 standard drink of alcohol     Comment: Minimal, social    Drug use: No    Sexual activity: Yes     Partners: Female     Birth control/protection: Male Surgical     Comment: Vasectomy   Other Topics Concern    Parent/sibling w/ CABG, MI or angioplasty before 65F 55M? No   Social History Narrative    Lives with wife and two son     27 y.o son autistic    22 y.o son as well.     Social Drivers of Health     Financial Resource Strain: Low Risk  (10/8/2024)    Financial Resource Strain     Within the past 12 months, have you or your family members you live with been unable to get utilities (heat, electricity) when it was really needed?: No   Food Insecurity: Low Risk  (10/8/2024)    Food Insecurity     Within the past 12 months, did you worry that your food would run out before you got money to buy more?: No     Within the past 12 months, did the food you bought just not last and you didn t have money to get more?: No   Transportation Needs: Low Risk  (10/8/2024)    Transportation Needs     Within the past 12 months, has lack of transportation kept you from medical appointments, getting your medicines, non-medical meetings or appointments, work, or from getting things that you need?: No   Physical Activity: Insufficiently Active (10/8/2024)    Exercise Vital Sign     Days of Exercise per Week: 4 days     Minutes of Exercise per Session: 30 min   Stress: No Stress Concern Present (10/8/2024)    [employer] - Occupational Stress Questionnaire     Feeling of Stress : Not at all   Social Connections: Unknown (10/8/2024)    Social Connection and Isolation Panel [NHANES]     Frequency of Communication with Friends and Family: Not on file     Frequency of Social Gatherings with Friends and  Family: Twice a week     Attends Orthodoxy Services: Not on file     Active Member of Clubs or Organizations: Not on file     Attends Club or Organization Meetings: Not on file     Marital Status: Not on file   Interpersonal Safety: Low Risk  (1/15/2025)    Interpersonal Safety     Do you feel physically and emotionally safe where you currently live?: Yes     Within the past 12 months, have you been hit, slapped, kicked or otherwise physically hurt by someone?: No     Within the past 12 months, have you been humiliated or emotionally abused in other ways by your partner or ex-partner?: No   Housing Stability: Low Risk  (10/8/2024)    Housing Stability     Do you have housing? : Yes     Are you worried about losing your housing?: No     ROS  negative other than stated above    Exam:  GENERAL APPEARANCE: healthy, alert and no distress  EYES: EOMI,  PERRL  HENT: Left TM erythematous and bulging with distorted light reflex.  Canal is clear no perforation is noted.  Right TM and canal is clear.  Oral mucosa moist.  No significant nasal congestion or sinus pain to palpation  NECK: no adenopathy, no asymmetry, masses, or scars and thyroid normal to palpation  RESP: Mild late expiratory wheezing noted no labored breathing.  CV: regular rates and rhythm, normal S1 S2, no S3 or S4 and no murmur, click or rub -  SKIN: no suspicious lesions or rashes    Chest x-ray with no clear infiltrate noted per my independent read pending radiology review    assessment/plan:  (H65.92) OME (otitis media with effusion), left  (primary encounter diagnosis)  Comment:   Plan: amoxicillin-clavulanate (AUGMENTIN) 875-125 MG         tablet        Patient with a 10-day history of URI related symptoms that seem to be getting worse.  Thought that he may have had influenza as his son had recently.  Chest x-ray was obtained with no clear infiltrate noted per my independent read pending radiology review.  Does have erythematous and bulging left tympanic  membrane concerning for otitis media.  There is no perforation at this time but did discuss due to the severity of the infection perforation is possible.  Signs of this were discussed if any drainage occurs from the ear will follow-up in 2 weeks to have ear rechecked to ensure healing.  May use over-the-counter meds for pain.  Augmentin as directed advised to take with food and side effects were discussed.  Follow-up as needed    (R05.1) Acute cough  Comment:   Plan: XR Chest 2 Views            (R06.2) Wheezing  Comment:   Plan: predniSONE (DELTASONE) 20 MG tablet        Patient with 10-day history of URI related symptoms with some mild wheezing at this time.  Will give short burst of prednisone.  May continue with over-the-counter meds for symptomatic relief.  Follow-up as needed

## 2025-04-08 ENCOUNTER — OFFICE VISIT (OUTPATIENT)
Dept: FAMILY MEDICINE | Facility: CLINIC | Age: 60
End: 2025-04-08
Payer: COMMERCIAL

## 2025-04-08 VITALS
OXYGEN SATURATION: 99 % | DIASTOLIC BLOOD PRESSURE: 78 MMHG | HEIGHT: 68 IN | RESPIRATION RATE: 16 BRPM | WEIGHT: 187 LBS | BODY MASS INDEX: 28.34 KG/M2 | TEMPERATURE: 97.4 F | SYSTOLIC BLOOD PRESSURE: 124 MMHG | HEART RATE: 85 BPM

## 2025-04-08 DIAGNOSIS — H66.002 NON-RECURRENT ACUTE SUPPURATIVE OTITIS MEDIA OF LEFT EAR WITHOUT SPONTANEOUS RUPTURE OF TYMPANIC MEMBRANE: ICD-10-CM

## 2025-04-08 DIAGNOSIS — H93.8X2 EAR FULLNESS, LEFT: Primary | ICD-10-CM

## 2025-04-08 PROCEDURE — 99213 OFFICE O/P EST LOW 20 MIN: CPT | Performed by: PHYSICIAN ASSISTANT

## 2025-04-08 PROCEDURE — 1126F AMNT PAIN NOTED NONE PRSNT: CPT | Performed by: PHYSICIAN ASSISTANT

## 2025-04-08 PROCEDURE — 3078F DIAST BP <80 MM HG: CPT | Performed by: PHYSICIAN ASSISTANT

## 2025-04-08 PROCEDURE — 3074F SYST BP LT 130 MM HG: CPT | Performed by: PHYSICIAN ASSISTANT

## 2025-04-08 RX ORDER — FLUTICASONE PROPIONATE 50 MCG
1 SPRAY, SUSPENSION (ML) NASAL 2 TIMES DAILY
Qty: 16 G | Refills: 0 | Status: SHIPPED | OUTPATIENT
Start: 2025-04-08

## 2025-04-08 ASSESSMENT — PAIN SCALES - GENERAL: PAINLEVEL_OUTOF10: NO PAIN (0)

## 2025-04-08 NOTE — PROGRESS NOTES
"  Assessment & Plan     Ear fullness, left  Non-recurrent acute suppurative otitis media of left ear without spontaneous rupture of tympanic membrane  Ry continues to have persistent ear symptoms following his bout with influenza and AOM. There  is no current infection but likely residual fluid/inflammation. Recommend starting sudafed and below. Send note oer the weekend and if symptoms persist would consider another round of abx.   - fluticasone (FLONASE) 50 MCG/ACT nasal spray; Spray 1 spray into both nostrils 2 times daily.            BMI  Estimated body mass index is 28.43 kg/m  as calculated from the following:    Height as of this encounter: 1.727 m (5' 8\").    Weight as of this encounter: 84.8 kg (187 lb).           Subjective   Ry is a 60 year old, presenting for the following health issues:  Ear Problem        4/8/2025     7:16 AM   Additional Questions   Roomed by Shanell PETERS CMA   Accompanied by MELANY         4/8/2025     7:16 AM   Patient Reported Additional Medications   Patient reports taking the following new medications None     History of Present Illness       Reason for visit:  Hearing loss post ear infection    He eats 0-1 servings of fruits and vegetables daily.He consumes 0 sweetened beverage(s) daily.He exercises with enough effort to increase his heart rate 10 to 19 minutes per day.  He exercises with enough effort to increase his heart rate 4 days per week.   He is taking medications regularly.        Castillo MARTINEZ Alejandro is a 60 year old male who presents today for ongoing upper respiratory symptoms   He likely had influenza (exposure to his sons) and has had intermittent sore throat and then a development of AOM  Treated with abx which improved the pain in the ear but his ear fullness continues to be present  Hearing is muffled; feels pressured  Cough is still persistent   -no shortness of breath; not keeping him up at night just still present  He has not used any OTC regularly; periodic        Review " "of Systems  Constitutional, HEENT, cardiovascular, pulmonary, gi and gu systems are negative, except as otherwise noted.      Objective    /78 (BP Location: Right arm, Patient Position: Sitting, Cuff Size: Adult Large)   Pulse 85   Temp 97.4  F (36.3  C) (Tympanic)   Resp 16   Ht 1.727 m (5' 8\")   Wt 84.8 kg (187 lb)   SpO2 99%   BMI 28.43 kg/m    Body mass index is 28.43 kg/m .  Physical Exam   GENERAL: alert and no distress  EYES: Eyes grossly normal to inspection, PERRL and conjunctivae and sclerae normal  HENT: normal cephalic/atraumatic, right ear: normal: no effusions, no erythema, normal landmarks, left ear: no bulging though there is residual scarring/fluid levels, nasal mucosa edematous , and oropharynx clear  NECK: small tender anterior cervical adenopathy  RESP: lungs clear to auscultation - no rales, rhonchi or wheezes  CV: regular rate and rhythm, normal S1 S2, no S3 or S4, no murmur, click or rub, no peripheral edema          Signed Electronically by: Nicolas Escobedo PA-C    "

## 2025-04-15 ENCOUNTER — OFFICE VISIT (OUTPATIENT)
Dept: NEUROSURGERY | Facility: CLINIC | Age: 60
End: 2025-04-15
Payer: COMMERCIAL

## 2025-04-15 VITALS — HEART RATE: 103 BPM | DIASTOLIC BLOOD PRESSURE: 79 MMHG | OXYGEN SATURATION: 97 % | SYSTOLIC BLOOD PRESSURE: 128 MMHG

## 2025-04-15 DIAGNOSIS — Z98.890 S/P SPINAL SURGERY: Primary | ICD-10-CM

## 2025-04-15 PROCEDURE — 99024 POSTOP FOLLOW-UP VISIT: CPT | Performed by: NEUROLOGICAL SURGERY

## 2025-04-15 PROCEDURE — 3078F DIAST BP <80 MM HG: CPT | Performed by: NEUROLOGICAL SURGERY

## 2025-04-15 PROCEDURE — 1126F AMNT PAIN NOTED NONE PRSNT: CPT | Performed by: NEUROLOGICAL SURGERY

## 2025-04-15 PROCEDURE — 3074F SYST BP LT 130 MM HG: CPT | Performed by: NEUROLOGICAL SURGERY

## 2025-04-15 ASSESSMENT — PAIN SCALES - GENERAL: PAINLEVEL_OUTOF10: NO PAIN (0)

## 2025-04-15 NOTE — NURSING NOTE
"Castillo Allen is a 60 year old male who presents for:  Chief Complaint   Patient presents with    Surgical Followup     12 week follow-up          Vitals:    Vitals:    04/15/25 1326   BP: 128/79   Pulse: 103   SpO2: 97%       BMI:  Estimated body mass index is 28.43 kg/m  as calculated from the following:    Height as of 4/8/25: 5' 8\" (1.727 m).    Weight as of 4/8/25: 187 lb (84.8 kg).    Pain Score:  No Pain (0)        Amendo Phorn      "

## 2025-04-15 NOTE — LETTER
"4/15/2025      Castillo Allen  [address]  [facility] [address]      Dear Colleague,    Thank you for referring your patient, Castillo Allen, to the [facility] [place]. Please see a copy of my visit note below.    It was a pleasure to see Castillo Allen today in Neurosurgery Clinic. He is a 60 year old male who underwent:    January 15, 2025     [facility]   Operative Note     Pre-operative diagnosis: Spinal stenosis of lumbar region with radiculopathy [M48.061, M54.16]  Synovial cyst of lumbar spine [M71.38]   Post-operative diagnosis Same   Procedure: Procedure(s):  Left Lumbar 4 to Lumbar 5 minimally invasive bilateral hemilaminectomy and medial facetectomy with resection of synovial cyst    Surgeon(s): Surgeons and Role:     * Antolin Kurtz MD - Primary     * Abi Castellanos APRN CNP   Estimated blood loss:             10ml   Specimens: * No specimens in log *   Findings: Bilateral L4-5 decompression performed.  Intraoperative durotomy noted repaired primarily without ongoing CSF leakage.     Overall he is doing quite well and has had resolution of his preoperative symptoms.    Vitals:    04/15/25 1326   BP: 128/79   Pulse: 103   SpO2: 97%     Estimated body mass index is 28.43 kg/m  as calculated from the following:    Height as of 4/8/25: 1.727 m (5' 8\").    Weight as of 4/8/25: 84.8 kg (187 lb).  No Pain (0)    Well-healed lumbar incision  Neurologically intact.    Imaging: No new imaging    Assessment: Status post lumbar decompression, doing well.    Plan: He may follow-up on an as-needed basis.         Again, thank you for allowing me to participate in the care of your patient.        Sincerely,        Antolin Kurtz MD    Electronically signed"

## 2025-04-15 NOTE — PROGRESS NOTES
"It was a pleasure to see Castillo Allen today in Neurosurgery Clinic. He is a 60 year old male who underwent:    January 15, 2025     [facility]   Operative Note     Pre-operative diagnosis: Spinal stenosis of lumbar region with radiculopathy [M48.061, M54.16]  Synovial cyst of lumbar spine [M71.38]   Post-operative diagnosis Same   Procedure: Procedure(s):  Left Lumbar 4 to Lumbar 5 minimally invasive bilateral hemilaminectomy and medial facetectomy with resection of synovial cyst    Surgeon(s): Surgeons and Role:     * Antolin Kurtz MD - Primary     * Abi Castellanos APRN CNP   Estimated blood loss:             10ml   Specimens: * No specimens in log *   Findings: Bilateral L4-5 decompression performed.  Intraoperative durotomy noted repaired primarily without ongoing CSF leakage.     Overall he is doing quite well and has had resolution of his preoperative symptoms.    Vitals:    04/15/25 1326   BP: 128/79   Pulse: 103   SpO2: 97%     Estimated body mass index is 28.43 kg/m  as calculated from the following:    Height as of 4/8/25: 1.727 m (5' 8\").    Weight as of 4/8/25: 84.8 kg (187 lb).  No Pain (0)    Well-healed lumbar incision  Neurologically intact.    Imaging: No new imaging    Assessment: Status post lumbar decompression, doing well.    Plan: He may follow-up on an as-needed basis.     "

## 2025-05-12 DIAGNOSIS — I10 ESSENTIAL HYPERTENSION: ICD-10-CM

## 2025-05-12 RX ORDER — OLMESARTAN MEDOXOMIL 5 MG/1
5 TABLET ORAL DAILY
Qty: 90 TABLET | Refills: 2 | Status: SHIPPED | OUTPATIENT
Start: 2025-05-12

## 2025-06-03 DIAGNOSIS — H93.8X2 EAR FULLNESS, LEFT: ICD-10-CM

## 2025-06-03 RX ORDER — FLUTICASONE PROPIONATE 50 MCG
1 SPRAY, SUSPENSION (ML) NASAL 2 TIMES DAILY
Qty: 48 G | Refills: 0 | Status: SHIPPED | OUTPATIENT
Start: 2025-06-03

## 2025-06-16 NOTE — Clinical Note
Routing message to PCP for review because BP checked at pharmacy is above goal. Recommended patient to follow-up with PCP.  PCP please close this encounter. 
yes

## 2025-08-03 ENCOUNTER — PATIENT OUTREACH (OUTPATIENT)
Dept: CARE COORDINATION | Facility: CLINIC | Age: 60
End: 2025-08-03
Payer: COMMERCIAL

## 2025-08-11 DIAGNOSIS — R97.20 ELEVATED PROSTATE SPECIFIC ANTIGEN (PSA): Primary | ICD-10-CM

## (undated) DEVICE — GLOVE BIOGEL PI MICRO SZ 8.0 48580

## (undated) DEVICE — SU VICRYL+ 0 27 UR6 VLT VCP603H

## (undated) DEVICE — LINEN ORTHO ACL PACK 5447

## (undated) DEVICE — GLOVE BIOGEL PI MICRO INDICATOR UNDERGLOVE SZ 6.5 48965

## (undated) DEVICE — DRAPE STERI TOWEL LG 1010

## (undated) DEVICE — SOL NACL 0.9% IRRIG 1000ML BOTTLE 2F7124

## (undated) DEVICE — Device

## (undated) DEVICE — SU DERMABOND ADVANCED .7ML DNX12

## (undated) DEVICE — GLOVE BIOGEL PI MICRO INDICATOR UNDERGLOVE SZ 8.0 48980

## (undated) DEVICE — ESU GROUND PAD ADULT W/CORD E7507

## (undated) DEVICE — GLOVE BIOGEL PI MICRO INDICATOR UNDERGLOVE SZ 8.5 48985

## (undated) DEVICE — RX SURGIFLO HEMOSTATIC MATRIX 8ML 2991

## (undated) DEVICE — SYR 30ML LL W/O NDL 302832

## (undated) DEVICE — SU MONOCRYL 4-0 PS-2 27" UND Y426H

## (undated) DEVICE — SU VICRYL 2-0 CT-2 CR 8X18" J726D

## (undated) DEVICE — CUSHION INSERT LG PRONE VIEW JACKSON TABLE

## (undated) DEVICE — DRAPE X-RAY TUBE 00-901169-01-OEC

## (undated) DEVICE — PACK SMALL SPINE RIDGES

## (undated) DEVICE — TOOL DISSECT MIDAS MR8 14CM TELESC MATCH 2.5 MR8-T14MH25

## (undated) DEVICE — SPONGE COTTONOID 1/2X1/2" 80-1400

## (undated) DEVICE — DRAPE MICROSCOPE LEICA 54X120" 09-MK653

## (undated) DEVICE — GOWN XXL 9575

## (undated) DEVICE — SUCTION MANIFOLD NEPTUNE 2 SYS 4 PORT 0702-020-000

## (undated) DEVICE — DEVICE SUTURING SFT TISS XPND STRL F/LIGATION GZL-002

## (undated) DEVICE — GLOVE BIOGEL PI MICRO SZ 6.5 48565

## (undated) DEVICE — ESU ELEC BLADE 6" COATED/INSULATED E1455-6

## (undated) DEVICE — PREP CHLORAPREP 26ML TINTED HI-LITE ORANGE 930815

## (undated) DEVICE — GLOVE BIOGEL PI MICRO SZ 7.5 48575

## (undated) DEVICE — DRAPE MAYO STAND 23X54 8337

## (undated) RX ORDER — EPHEDRINE SULFATE 50 MG/ML
INJECTION, SOLUTION INTRAMUSCULAR; INTRAVENOUS; SUBCUTANEOUS
Status: DISPENSED
Start: 2025-01-15

## (undated) RX ORDER — METOPROLOL TARTRATE 1 MG/ML
INJECTION, SOLUTION INTRAVENOUS
Status: DISPENSED
Start: 2025-01-15

## (undated) RX ORDER — FENTANYL CITRATE 50 UG/ML
INJECTION, SOLUTION INTRAMUSCULAR; INTRAVENOUS
Status: DISPENSED
Start: 2025-01-15

## (undated) RX ORDER — ACETAMINOPHEN 325 MG/1
TABLET ORAL
Status: DISPENSED
Start: 2025-01-15

## (undated) RX ORDER — FENTANYL CITRATE-0.9 % NACL/PF 10 MCG/ML
PLASTIC BAG, INJECTION (ML) INTRAVENOUS
Status: DISPENSED
Start: 2025-01-15

## (undated) RX ORDER — CEFAZOLIN SODIUM/WATER 2 G/20 ML
SYRINGE (ML) INTRAVENOUS
Status: DISPENSED
Start: 2025-01-15

## (undated) RX ORDER — BUPIVACAINE HYDROCHLORIDE AND EPINEPHRINE 2.5; 5 MG/ML; UG/ML
INJECTION, SOLUTION EPIDURAL; INFILTRATION; INTRACAUDAL; PERINEURAL
Status: DISPENSED
Start: 2025-01-15

## (undated) RX ORDER — ONDANSETRON 2 MG/ML
INJECTION INTRAMUSCULAR; INTRAVENOUS
Status: DISPENSED
Start: 2025-01-15